# Patient Record
Sex: MALE | Race: WHITE | Employment: OTHER | URBAN - NONMETROPOLITAN AREA
[De-identification: names, ages, dates, MRNs, and addresses within clinical notes are randomized per-mention and may not be internally consistent; named-entity substitution may affect disease eponyms.]

---

## 2017-01-02 ENCOUNTER — HOSPITAL ENCOUNTER (EMERGENCY)
Facility: HOSPITAL | Age: 72
Discharge: HOME | End: 2017-01-02
Attending: PHYSICIAN ASSISTANT | Admitting: PHYSICIAN ASSISTANT
Payer: COMMERCIAL

## 2017-01-02 VITALS
RESPIRATION RATE: 20 BRPM | HEART RATE: 65 BPM | OXYGEN SATURATION: 94 % | DIASTOLIC BLOOD PRESSURE: 83 MMHG | TEMPERATURE: 97.8 F | SYSTOLIC BLOOD PRESSURE: 177 MMHG

## 2017-01-02 DIAGNOSIS — M25.512 ACUTE PAIN OF LEFT SHOULDER: ICD-10-CM

## 2017-01-02 DIAGNOSIS — J20.9 ACUTE BRONCHITIS, UNSPECIFIED ORGANISM: ICD-10-CM

## 2017-01-02 LAB
ANION GAP SERPL CALCULATED.3IONS-SCNC: 10 MMOL/L (ref 3–14)
BASOPHILS # BLD AUTO: 0 10E9/L (ref 0–0.2)
BASOPHILS NFR BLD AUTO: 0.7 %
BUN SERPL-MCNC: 19 MG/DL (ref 7–30)
CALCIUM SERPL-MCNC: 8.2 MG/DL (ref 8.5–10.1)
CHLORIDE SERPL-SCNC: 107 MMOL/L (ref 94–109)
CO2 SERPL-SCNC: 24 MMOL/L (ref 20–32)
CREAT SERPL-MCNC: 0.73 MG/DL (ref 0.66–1.25)
DIFFERENTIAL METHOD BLD: NORMAL
EOSINOPHIL # BLD AUTO: 0.1 10E9/L (ref 0–0.7)
EOSINOPHIL NFR BLD AUTO: 1.4 %
ERYTHROCYTE [DISTWIDTH] IN BLOOD BY AUTOMATED COUNT: 13.8 % (ref 10–15)
GFR SERPL CREATININE-BSD FRML MDRD: ABNORMAL ML/MIN/1.7M2
GLUCOSE SERPL-MCNC: 117 MG/DL (ref 70–99)
HCT VFR BLD AUTO: 44.2 % (ref 40–53)
HGB BLD-MCNC: 14.2 G/DL (ref 13.3–17.7)
IMM GRANULOCYTES # BLD: 0 10E9/L (ref 0–0.4)
IMM GRANULOCYTES NFR BLD: 0.2 %
LYMPHOCYTES # BLD AUTO: 0.9 10E9/L (ref 0.8–5.3)
LYMPHOCYTES NFR BLD AUTO: 16.6 %
MCH RBC QN AUTO: 27.3 PG (ref 26.5–33)
MCHC RBC AUTO-ENTMCNC: 32.1 G/DL (ref 31.5–36.5)
MCV RBC AUTO: 85 FL (ref 78–100)
MONOCYTES # BLD AUTO: 0.6 10E9/L (ref 0–1.3)
MONOCYTES NFR BLD AUTO: 10.8 %
NEUTROPHILS # BLD AUTO: 4 10E9/L (ref 1.6–8.3)
NEUTROPHILS NFR BLD AUTO: 70.3 %
NRBC # BLD AUTO: 0 10*3/UL
NRBC BLD AUTO-RTO: 0 /100
PLATELET # BLD AUTO: 156 10E9/L (ref 150–450)
POTASSIUM SERPL-SCNC: 3.6 MMOL/L (ref 3.4–5.3)
RBC # BLD AUTO: 5.21 10E12/L (ref 4.4–5.9)
SODIUM SERPL-SCNC: 141 MMOL/L (ref 133–144)
TROPONIN I SERPL-MCNC: 0.02 UG/L (ref 0–0.04)
WBC # BLD AUTO: 5.7 10E9/L (ref 4–11)

## 2017-01-02 PROCEDURE — 84484 ASSAY OF TROPONIN QUANT: CPT | Performed by: FAMILY MEDICINE

## 2017-01-02 PROCEDURE — 96374 THER/PROPH/DIAG INJ IV PUSH: CPT

## 2017-01-02 PROCEDURE — 94640 AIRWAY INHALATION TREATMENT: CPT

## 2017-01-02 PROCEDURE — 85025 COMPLETE CBC W/AUTO DIFF WBC: CPT | Performed by: FAMILY MEDICINE

## 2017-01-02 PROCEDURE — 99285 EMERGENCY DEPT VISIT HI MDM: CPT | Mod: 25

## 2017-01-02 PROCEDURE — 71010 ZZHC CHEST ONE VIEW: CPT | Mod: TC

## 2017-01-02 PROCEDURE — 93005 ELECTROCARDIOGRAM TRACING: CPT

## 2017-01-02 PROCEDURE — 80048 BASIC METABOLIC PNL TOTAL CA: CPT | Performed by: FAMILY MEDICINE

## 2017-01-02 PROCEDURE — 25000125 ZZHC RX 250: Performed by: PHYSICIAN ASSISTANT

## 2017-01-02 PROCEDURE — 36415 COLL VENOUS BLD VENIPUNCTURE: CPT | Performed by: FAMILY MEDICINE

## 2017-01-02 PROCEDURE — 40000275 ZZH STATISTIC RCP TIME EA 10 MIN

## 2017-01-02 PROCEDURE — 93010 ELECTROCARDIOGRAM REPORT: CPT | Performed by: INTERNAL MEDICINE

## 2017-01-02 PROCEDURE — 99284 EMERGENCY DEPT VISIT MOD MDM: CPT | Performed by: PHYSICIAN ASSISTANT

## 2017-01-02 RX ORDER — IPRATROPIUM BROMIDE AND ALBUTEROL SULFATE 2.5; .5 MG/3ML; MG/3ML
3 SOLUTION RESPIRATORY (INHALATION) ONCE
Status: COMPLETED | OUTPATIENT
Start: 2017-01-02 | End: 2017-01-02

## 2017-01-02 RX ORDER — AZITHROMYCIN 250 MG/1
TABLET, FILM COATED ORAL
Qty: 6 TABLET | Refills: 0 | Status: SHIPPED | OUTPATIENT
Start: 2017-01-02 | End: 2017-01-02

## 2017-01-02 RX ORDER — KETOROLAC TROMETHAMINE 15 MG/ML
15 INJECTION, SOLUTION INTRAMUSCULAR; INTRAVENOUS ONCE
Status: COMPLETED | OUTPATIENT
Start: 2017-01-02 | End: 2017-01-02

## 2017-01-02 RX ORDER — AZITHROMYCIN 250 MG/1
TABLET, FILM COATED ORAL
Qty: 6 TABLET | Refills: 0 | Status: SHIPPED | OUTPATIENT
Start: 2017-01-02 | End: 2017-04-14

## 2017-01-02 RX ADMIN — IPRATROPIUM BROMIDE AND ALBUTEROL SULFATE 3 ML: .5; 3 SOLUTION RESPIRATORY (INHALATION) at 10:41

## 2017-01-02 RX ADMIN — KETOROLAC TROMETHAMINE 15 MG: 15 INJECTION, SOLUTION INTRAMUSCULAR; INTRAVENOUS at 10:49

## 2017-01-02 ASSESSMENT — ENCOUNTER SYMPTOMS
ACTIVITY CHANGE: 0
CHILLS: 0
ABDOMINAL PAIN: 0
FEVER: 0
DIZZINESS: 0
HEADACHES: 1
CHEST TIGHTNESS: 0
SHORTNESS OF BREATH: 0
WHEEZING: 0
EYE PAIN: 0
COUGH: 1
VOMITING: 0
DIARRHEA: 0
LIGHT-HEADEDNESS: 0
APPETITE CHANGE: 0
PHOTOPHOBIA: 0
EYE REDNESS: 0
NAUSEA: 0

## 2017-01-02 NOTE — ED NOTES
Patient presents today by himself for left shoulder pain, left neck/jaw pain/pressure and intermittent chest pressure since Thursday.   Patient is also SOB but states he is always SOB and that is unchanged.   Patient does have have a cough but states he always has a cough.   Monitors applied, IV est and EKG done.

## 2017-01-02 NOTE — DISCHARGE INSTRUCTIONS
I believe there may be a small abnormality on your x-ray that may represent infection.     I have decided to treat you with an antibiotic.    Your shoulder pain is atypical for heart pain, but not completely unheard of.  The test that looks foe heart damage was normal.  This rules out a heart attack NOW, but DOES NOT rule one out in the future.      Your need to follow-up in the clinic this week for a recheck.    You need to return HERE for ANY worsening symptoms, chest pain, changes in your chronic shortness of breath, fevers, or simply return here for ANY other concerns or questions.

## 2017-01-02 NOTE — ED AVS SNAPSHOT
[facility]    [address] [address]    Phone:  [phone]                                       Lamont Doran   MRN: [id number]    Department:  [facility]   Date of Visit:  1/2/2017           Patient Information     Date Of Birth          1945        Your diagnoses for this visit were:     Acute bronchitis, unspecified organism     Acute pain of left shoulder        You were seen by Memo Quan PA-C.      Follow-up Information     Follow up with Jordin Aleman In 3 days.    Specialty:  Internal Medicine    Contact information:    [facility]  [address]  [facility] [address] [phone]          Follow up with [facility].    Specialty:  EMERGENCY MEDICINE    Why:  If symptoms worsen    Contact information:    [address] [address] [phone]    Additional information:    From [facility]: Take [address]. Turn left at [address]. Turn left at the first stoplight on [address]. At the first stop sign, take a right onto [address]. Take a left into the parking lot and continue through until you reach the North enterance of the building.       From [place]: Take [address]. Take the [address] ramp towards [place]. Turn left onto [address]. Take a slight right onto [address]. Turn left at the first stoplight on [address]. At the first stop sign, take a right onto [address]. Take a left into the parking lot and continue through until you reach the North enterance of the building.       From [place]: Take [address]. Take a right at [address]. At the first stop sign, take a right onto [address]. Take a left into the parking lot and continue through until you reach the North enterance of the building.         Discharge Instructions       I believe there may be a small abnormality on your x-ray that may represent  infection.     I have decided to treat you with an antibiotic.    Your shoulder pain is atypical for heart pain, but not completely unheard of.  The test that looks foe heart damage was normal.  This rules out a heart attack NOW, but DOES NOT rule one out in the future.      Your need to follow-up in the clinic this week for a recheck.    You need to return HERE for ANY worsening symptoms, chest pain, changes in your chronic shortness of breath, fevers, or simply return here for ANY other concerns or questions.        Review of your medicines      START taking        Dose / Directions Last dose taken    azithromycin 250 MG tablet   Commonly known as:  ZITHROMAX Z-BRAYDEN   Quantity:  6 tablet        Two tablets on the first day, then one tablet daily for the next 4 days   Refills:  0          Our records show that you are taking the medicines listed below. If these are incorrect, please call your family doctor or clinic.        Dose / Directions Last dose taken    ASPIRIN PO   Dose:  162 mg        Take 162 mg by mouth daily   Refills:  0        clopidogrel 75 MG tablet   Commonly known as:  PLAVIX   Dose:  75 mg   Quantity:  30 tablet        Take 1 tablet (75 mg) by mouth daily   Refills:  0        LIPITOR PO   Dose:  80 mg        Take 80 mg by mouth daily   Refills:  0        LISINOPRIL PO   Dose:  2.5 mg        Take 2.5 mg by mouth daily (with dinner)   Refills:  0        METOPROLOL TARTRATE PO   Dose:  12.5 mg        Take 12.5 mg by mouth 2 times daily   Refills:  0        OMEPRAZOLE PO        Take by mouth every morning   Refills:  0                Prescriptions were sent or printed at these locations (1 Prescription)                   [id number] - [place] - [address] [address] [place] [address]    Telephone:  [phone]   Fax:  [phone]   Hours:                  E-Prescribed (1 of 1)         azithromycin (ZITHROMAX Z-BRAYDEN) 250 MG tablet                Procedures and tests  "performed during your visit     Basic metabolic panel    CBC with platelets differential    Cardiac Continuous Monitoring    EKG 12-lead, tracing only    Pulse oximetry nursing    Troponin I    XR Chest Port 1 View      Orders Needing Specimen Collection     None      Pending Results     Date and Time Order Name Status Description    2017 0941 XR Chest Port 1 View In process             Thank you for choosing [facility]       Thank you for choosing [facility] for your care. Our goal is always to provide you with excellent care. Hearing back from our patients is one way we can continue to improve our services. Please take a few minutes to complete the written survey that you may receive in the mail after you visit with us. Thank you!        [employer] Information     [employer] lets you send messages to your doctor, view your test results, renew your prescriptions, schedule appointments and more. To sign up, go to [email or web address] . Click on \"Log in\" on the left side of the screen, which will take you to the Welcome page. Then click on \"Sign up Now\" on the right side of the page.     You will be asked to enter the access code listed below, as well as some personal information. Please follow the directions to create your username and password.     Your access code is: [email or web address]  Expires: 2017 11:29 AM     Your access code will  in 90 days. If you need help or a new code, please call your [facility] clinic or [phone].        After Visit Summary       This is your record. Keep this with you and show to your community pharmacist(s) and doctor(s) at your next visit.                  "

## 2017-01-02 NOTE — ED AVS SNAPSHOT
[facility]    [address]    [place] [address]    Phone:  [phone]                                       Lamont Doran   MRN: [id number]    Department:  [facility]   Date of Visit:  1/2/2017           After Visit Summary Signature Page     I have received my discharge instructions, and my questions have been answered. I have discussed any challenges I see with this plan with the nurse or doctor.    ..........................................................................................................................................  Patient/Patient Representative Signature      ..........................................................................................................................................  Patient Representative Print Name and Relationship to Patient    ..................................................               ................................................  Date                                            Time    ..........................................................................................................................................  Reviewed by Signature/Title    ...................................................              ..............................................  Date                                                            Time

## 2017-01-02 NOTE — ED PROVIDER NOTES
"  History     Chief Complaint   Patient presents with     Shoulder Pain     onset last 3 days. radiates to left lateral neck.     Cold Symptoms     sinus and chest congestion, last week     The history is provided by the patient.     Lamont Doran is a 71 year old male who presented to the ED ambulatory for evaluation of left shoulder and neck pain.  He also has been experiencing cough with sinus and chest congestion for a approx one week.  Cough is productive with clear sputum. Tells me that he feels short of breath as well.  But when we discuss this, he tells me that he is \"always\" short of breath and has no concerns regarding this issue.  He was shoveling last week and the left sided shoulder/neck pain began on Thursday 12/29 and has been constant since that time.  He describes the pain as an \"ache.\"  Points to his left anterior shoulder and states that the aches seems to radiate to the left neck.  Symptoms seem to worsen with right sided rotation and slightly with full abduction.  Also has a mild left sided headache that worsens with cough.  No weakness. Otherwise there is no alleviating or exacerbating of the pain.  No falls.  No chest pain.  He has a hx of CABG and carotid endarterectomy.  On Plavix and ASA.  Primary care is in [place].        Social History     Social History     Marital Status:      Spouse Name: N/A     Number of Children: N/A     Years of Education: N/A     Social History Main Topics     Smoking status: None     Smokeless tobacco: None     Alcohol Use: None     Drug Use: None     Sexual Activity: Not Asked     Other Topics Concern     None     Social History Narrative      I have reviewed the Medications, Allergies, Past Medical and Surgical History, and Social History in the Epic system.    Review of Systems   Constitutional: Negative for fever, chills, activity change and appetite change.   Eyes: Negative for photophobia, pain, redness and visual disturbance.   Respiratory: Positive " for cough. Negative for chest tightness, shortness of breath and wheezing.    Cardiovascular: Negative for chest pain and leg swelling.   Gastrointestinal: Negative for nausea, vomiting, abdominal pain and diarrhea.   Genitourinary: Negative.    Musculoskeletal:        Left shoulder and left sided neck pain    Skin: Negative.    Neurological: Positive for headaches. Negative for dizziness and light-headedness.        Mild intermittent left sided headache        Physical Exam   BP: 142/64 mmHg  Pulse: 76  Temp: 96.5  F (35.8  C)  Resp: 20  SpO2: (!) 91 %  Physical Exam   Constitutional: He is oriented to person, place, and time. He appears well-developed and well-nourished.   Pleasant and talkative    HENT:   Head: Normocephalic and atraumatic.   Right Ear: External ear normal.   Left Ear: External ear normal.   Mouth/Throat: Oropharynx is clear and moist.   TMs are normal    Eyes: Conjunctivae and EOM are normal. Pupils are equal, round, and reactive to light.   Neck: Normal range of motion. Neck supple.   He has a small 2cm linear rash with small vesicle on the left neck.  No other findings.    Cardiovascular: Normal rate and regular rhythm.    Pulmonary/Chest: Effort normal and breath sounds normal. No respiratory distress. He has no wheezes. He exhibits no tenderness.   Abdominal: Soft. There is no tenderness. There is no guarding.   Musculoskeletal: He exhibits no edema.   Neurological: He is alert and oriented to person, place, and time.   Skin: Skin is warm and dry.   Psychiatric: He has a normal mood and affect.   Nursing note and vitals reviewed.      ED Course   Procedures        EKG shows a NSR with RBBB.  No significant change from previous.      CXR shows what appears to be a small infiltrate in the LLL.     Medications   ipratropium - albuterol 0.5 mg/2.5 mg/3 mL (DUONEB) neb solution 3 mL (3 mLs Nebulization Given 1/2/17 1041)   ketorolac (TORADOL) injection 15 mg (15 mg Intravenous Given 1/2/17 1049)      Results for orders placed or performed during the hospital encounter of 01/02/17 (from the past 24 hour(s))   CBC with platelets differential   Result Value Ref Range    WBC 5.7 4.0 - 11.0 10e9/L    RBC Count 5.21 4.4 - 5.9 10e12/L    Hemoglobin 14.2 13.3 - 17.7 g/dL    Hematocrit 44.2 40.0 - 53.0 %    MCV 85 78 - 100 fl    MCH 27.3 26.5 - 33.0 pg    MCHC 32.1 31.5 - 36.5 g/dL    RDW 13.8 10.0 - 15.0 %    Platelet Count 156 150 - 450 10e9/L    Diff Method Automated Method     % Neutrophils 70.3 %    % Lymphocytes 16.6 %    % Monocytes 10.8 %    % Eosinophils 1.4 %    % Basophils 0.7 %    % Immature Granulocytes 0.2 %    Nucleated RBCs 0 0 /100    Absolute Neutrophil 4.0 1.6 - 8.3 10e9/L    Absolute Lymphocytes 0.9 0.8 - 5.3 10e9/L    Absolute Monocytes 0.6 0.0 - 1.3 10e9/L    Absolute Eosinophils 0.1 0.0 - 0.7 10e9/L    Absolute Basophils 0.0 0.0 - 0.2 10e9/L    Abs Immature Granulocytes 0.0 0 - 0.4 10e9/L    Absolute Nucleated RBC 0.0    Basic metabolic panel   Result Value Ref Range    Sodium 141 133 - 144 mmol/L    Potassium 3.6 3.4 - 5.3 mmol/L    Chloride 107 94 - 109 mmol/L    Carbon Dioxide 24 20 - 32 mmol/L    Anion Gap 10 3 - 14 mmol/L    Glucose 117 (H) 70 - 99 mg/dL    Urea Nitrogen 19 7 - 30 mg/dL    Creatinine 0.73 0.66 - 1.25 mg/dL    GFR Estimate >90  Non  GFR Calc   >60 mL/min/1.7m2    GFR Estimate If Black >90   GFR Calc   >60 mL/min/1.7m2    Calcium 8.2 (L) 8.5 - 10.1 mg/dL   Troponin I   Result Value Ref Range    Troponin I ES 0.016 0.000 - 0.045 ug/L        Critical Care time:  none               Labs Ordered and Resulted from Time of ED Arrival Up to the Time of Departure from the ED   BASIC METABOLIC PANEL - Abnormal; Notable for the following:     Glucose 117 (*)     Calcium 8.2 (*)     All other components within normal limits   CBC WITH PLATELETS DIFFERENTIAL   TROPONIN I   CARDIAC CONTINUOUS MONITORING   PULSE OXIMETRY NURSING       Assessments & Plan  (with Medical Decision Making)   Mr. Hill presented to the ED ambulatory with complaints of left shoulder pain and productive cough with clear sputum. He is chronically dyspneic.  No fevers.  He has a long hx of vascular disease and CAD.  We discussed his shoulder and neck pain.  This is certainly atypical for ACS.  With his constant pain for >72 hours and a negative troponin, anginal equivalent or ACS is highly unlikely.  I believe that his CXR has a LLL abnormality.  I am going to treat him with Azithromycin and close follow-up.  He does have a small vesicular rash on the left side of his neck with a possible C3 distribution, but at this point I cannot say his symptoms are from shingles with confidence.  He is outside the window for treatment as well.  Please see the discharge instructions.  He was advised to return here for ANY changes or worsening of his symptoms or pain.  He needs to follow-up in the clinic this week.      I have reviewed the nursing notes.    I have reviewed the findings, diagnosis, plan and need for follow up with the patient.    New Prescriptions    AZITHROMYCIN (ZITHROMAX Z-BRAYDEN) 250 MG TABLET    Two tablets on the first day, then one tablet daily for the next 4 days       Final diagnoses:   Acute bronchitis, unspecified organism   Acute pain of left shoulder       1/2/2017   [facility]      Memo Quan PA-C  01/02/17 2230

## 2017-04-14 ENCOUNTER — HOSPITAL ENCOUNTER (EMERGENCY)
Facility: HOSPITAL | Age: 72
Discharge: HOME | End: 2017-04-14
Attending: NURSE PRACTITIONER | Admitting: NURSE PRACTITIONER
Payer: COMMERCIAL

## 2017-04-14 VITALS
BODY MASS INDEX: 33.33 KG/M2 | DIASTOLIC BLOOD PRESSURE: 82 MMHG | TEMPERATURE: 98.1 F | OXYGEN SATURATION: 95 % | RESPIRATION RATE: 18 BRPM | WEIGHT: 239 LBS | SYSTOLIC BLOOD PRESSURE: 139 MMHG

## 2017-04-14 DIAGNOSIS — Z79.01 LONG TERM (CURRENT) USE OF ANTICOAGULANTS: ICD-10-CM

## 2017-04-14 DIAGNOSIS — S01.312A LACERATION OF AURICLE OF LEFT EAR, INITIAL ENCOUNTER: ICD-10-CM

## 2017-04-14 PROCEDURE — 12011 RPR F/E/E/N/L/M 2.5 CM/<: CPT | Performed by: NURSE PRACTITIONER

## 2017-04-14 PROCEDURE — 40000268 ZZH STATISTIC NO CHARGES

## 2017-04-14 PROCEDURE — 25000125 ZZHC RX 250: Performed by: NURSE PRACTITIONER

## 2017-04-14 PROCEDURE — 12011 RPR F/E/E/N/L/M 2.5 CM/<: CPT

## 2017-04-14 PROCEDURE — 27210995 ZZH RX 272: Performed by: NURSE PRACTITIONER

## 2017-04-14 RX ORDER — CLOPIDOGREL BISULFATE 75 MG/1
75 TABLET ORAL DAILY
COMMUNITY
End: 2020-01-01

## 2017-04-14 RX ORDER — LIDOCAINE 40 MG/G
CREAM TOPICAL ONCE
Status: COMPLETED | OUTPATIENT
Start: 2017-04-14 | End: 2017-04-14

## 2017-04-14 RX ADMIN — Medication: at 16:51

## 2017-04-14 RX ADMIN — LIDOCAINE: 40 CREAM TOPICAL at 16:50

## 2017-04-14 ASSESSMENT — ENCOUNTER SYMPTOMS
WOUND: 1
CONSTITUTIONAL NEGATIVE: 1
NEUROLOGICAL NEGATIVE: 1

## 2017-04-14 NOTE — ED NOTES
"Patient presents with laceration to L ear - happened an hour ago while working on his boat and \"something flew up at me.\" Bleeding controlled at this time. Take Plavix daily.  "

## 2017-04-14 NOTE — ED NOTES
"Pt presents with 3/4\" laceration to left ear after having a ed handle hit his ear today at 1500. Had a tetanus shot last on 8-.  "

## 2017-04-14 NOTE — ED AVS SNAPSHOT
[facility]    [address] [address]    Phone:  [phone]                                       Laomnt Doran   MRN: [id number]    Department[facility]  [facility]   Date of Visit:  4/14/2017           Patient Information     Date Of Birth          1945        Your diagnoses for this visit were:     Laceration of auricle of left ear, initial encounter     Long term (current) use of anticoagulants        You were seen by Adrienne Manzo NP.      Follow-up Information     Follow up with Jordin Aleman    Specialty:  Internal Medicine    Why:  As needed    Contact information:    [facility]  [address]  [facility] [address] [phone]          Follow up with [facility].    Specialty:  EMERGENCY MEDICINE    Why:  As needed     Contact information:    [address] [address] [phone]    Additional information:    From [place]: Take [address]. Turn left at [address]. Turn left at the first stoplight on [address]. At the first stop sign, take a right onto [address]. Take a left into the parking lot and continue through until you reach the North enterance of the building.       From [place]: Take [address]. Take the [address] ramp towards [place]. Turn left onto [address]. Take a slight right onto [address]. Turn left at the first stoplight on [address]. At the first stop sign, take a right onto [address]. Take a left into the parking lot and continue through until you reach the North enterance of the building.       From [place]: Take [address]. Take a right at [address]. At the first stop sign, take a right onto [address]. Take a left into the parking lot and continue through until you reach the North enterance of the building.         Discharge Instructions           Monitor for infection and bleeding,  follow up here if concerns develop.     Laceration: Skin Adhesive  A laceration is a cut through the skin. You have a laceration that has been closed with skin adhesive, a type of skin glue.  Home care  Acetaminophen or ibuprofen may be taken for pain, unless another pain medicine was prescribed.  If you have chronic liver or kidney disease or ever had a stomach ulcer or gastrointestinal bleeding, talk with your healthcare provider before using these medications.  General care guidelines include:    Keep the wound clean and dry. You may shower or bathe as usual, but do not use soaps, lotions, or ointments on the wound area. Do not scrub the wound. After bathing, pat the wound dry with a soft towel.    Do not scratch, rub, or pick at the adhesive film. Do not place tape directly over the film.    Do not apply liquids (such as peroxide), ointments, or creams to the wound while the film is in place.    Most skin wounds heal without problems. However, an infection sometimes occurs despite proper treatment. Therefore, watch for the signs of infection listed below.  Follow-up care  Follow up with your healthcare provider as directed by the doctor or staff. The adhesive film will fall off in 5 to 7 days.  When to seek medical care  Get prompt medical attention if any of the following occur:    Signs of infection:    Fever of 100.4 F (38 C) or higher, or as directed by your health care provider    Increasing pain in the wound    Increasing redness or swelling    Pus coming from the wound    Wound bleeds more than a small amount or bleeding doesn t stop    Wound edges come apart    You feel numbness or weakness in the wound area that doesn t go away    7179-4724 [employer]. [address] [place] [address], [place] [address]. All rights reserved. This information is not intended as a substitute for professional medical care. Always follow your healthcare professional's instructions.               Review of your  "medicines      Our records show that you are taking the medicines listed below. If these are incorrect, please call your family doctor or clinic.        Dose / Directions Last dose taken    ASPIRIN PO   Dose:  162 mg        Take 162 mg by mouth daily   Refills:  0        LIPITOR PO   Dose:  80 mg        Take 80 mg by mouth daily   Refills:  0        LISINOPRIL PO   Dose:  2.5 mg        Take 2.5 mg by mouth daily (with dinner)   Refills:  0        METOPROLOL TARTRATE PO   Dose:  12.5 mg        Take 12.5 mg by mouth 2 times daily   Refills:  0        OMEPRAZOLE PO        Take by mouth every morning   Refills:  0        PLAVIX PO   Dose:  75 mg        Take 75 mg by mouth daily   Refills:  0                Orders Needing Specimen Collection     None      Pending Results     No orders found from 2017 to 4/15/2017.            Pending Culture Results     No orders found from 2017 to 4/15/2017.            Thank you for choosing [facility]       Thank you for choosing [facility] for your care. Our goal is always to provide you with excellent care. Hearing back from our patients is one way we can continue to improve our services. Please take a few minutes to complete the written survey that you may receive in the mail after you visit with us. Thank you!        [employer] Information     [employer] lets you send messages to your doctor, view your test results, renew your prescriptions, schedule appointments and more. To sign up, go to [email or web address] . Click on \"Log in\" on the left side of the screen, which will take you to the Welcome page. Then click on \"Sign up Now\" on the right side of the page.     You will be asked to enter the access code listed below, as well as some personal information. Please follow the directions to create your username and password.     Your access code is: [email or web address]  Expires: 2017  5:16 PM     Your access code will  in 90 days. If you need help or a new code, please call " your [place] clinic or [phone].        Care EveryWhere ID     This is your Care EveryWhere ID. This could be used by other organizations to access your [place] medical records  [id number]        After Visit Summary       This is your record. Keep this with you and show to your community pharmacist(s) and doctor(s) at your next visit.

## 2017-04-14 NOTE — ED PROVIDER NOTES
History     Chief Complaint   Patient presents with     Ear Laceration     L mid ear - on Plavix     The history is provided by the patient. No  was used.     Lamont Doran is a 71 year old male who presents with a laceration to his left ear. Was hit in the ear with a boat ed at home. Not able to control the bleeding at home, presents for repair.   Tdap is UTD.   No headache, dizziness or confusion. No chest pain or SOB. He has a hx of CABG and carotid endarterectomy. On Plavix and  mg. PCP is at [facility].    I have reviewed the Medications, Allergies, Past Medical and Surgical History, and Social History in the Epic system.    Review of Systems   Constitutional: Negative.    HENT: Negative.    Skin: Positive for wound (Laceration to Left ear).   Neurological: Negative.        Physical Exam   BP: 139/82  Heart Rate: 101  Temp: 98.1  F (36.7  C)  Resp: 18  Weight: 108.4 kg (239 lb)  SpO2: 95 %  Physical Exam   Constitutional: He is oriented to person, place, and time. He appears well-developed and well-nourished. No distress.   Eating chips and drinking soda in office, drove himself here.    HENT:   Head: Normocephalic.   Left Ear: Hearing and tympanic membrane normal. Left ear exhibits lacerations. No drainage or swelling. No decreased hearing is noted.   Ears:    Left ear: 1 cm laceration to helix, superficial abrasion to antihelix. Cartilage is intact. Scant amount of bleeding.     Eyes: Conjunctivae and lids are normal. No scleral icterus.   Neck: Normal range of motion. Neck supple.   Cardiovascular: Normal rate.    Pulmonary/Chest: Effort normal.   Musculoskeletal: Normal range of motion.   Neurological: He is alert and oriented to person, place, and time. Coordination normal.   Skin: Skin is warm. He is not diaphoretic.   Psychiatric: He has a normal mood and affect. His speech is normal and behavior is normal. Judgment and thought content normal. Cognition and memory are  normal.   Nursing note and vitals reviewed.      ED Course     ED Course     Laceration repair  Date/Time: 4/14/2017 5:05 PM  Performed by: ADRIENNE SOUTH  Authorized by: ADRIENNE SOUTH   Consent: Verbal consent obtained.  Risks and benefits: risks, benefits and alternatives were discussed  Consent given by: patient  Patient identity confirmed: verbally with patient  Body area: head/neck  Location details: left ear  Laceration length: 1 cm  Foreign bodies: no foreign bodies    Anesthesia:  Local Anesthetic: topical anesthetic   Sedation:  Patient sedated: no    Preparation: Patient was prepped and draped in the usual sterile fashion.  Irrigation solution: saline  Skin closure: glue  Approximation: close  Approximation difficulty: simple  Dressing: antibiotic ointment  Patient tolerance: Patient tolerated the procedure well with no immediate complications          Assessments & Plan (with Medical Decision Making)     I have reviewed the nursing notes.  I have reviewed the findings, diagnosis, plan and need for follow up with the patient.  Discussed wound care.   Monitor for infection and bleeding.   Follow up if concerns develop.   Written discharge instructions given regarding laceration.       Final diagnoses:   Laceration of auricle of left ear, initial encounter   Long term (current) use of anticoagulants       4/14/2017   [facility]     Adrienne South NP  04/14/17 0624

## 2017-04-14 NOTE — ED AVS SNAPSHOT
[facility]    [address] [place] [address]    Phone:  [phone]                                       Lamont Doran   MRN: [id number]    Department:  [facility]   Date of Visit:  4/14/2017           After Visit Summary Signature Page     I have received my discharge instructions, and my questions have been answered. I have discussed any challenges I see with this plan with the nurse or doctor.    ..........................................................................................................................................  Patient/Patient Representative Signature      ..........................................................................................................................................  Patient Representative Print Name and Relationship to Patient    ..................................................               ................................................  Date                                            Time    ..........................................................................................................................................  Reviewed by Signature/Title    ...................................................              ..............................................  Date                                                            Time

## 2017-04-14 NOTE — DISCHARGE INSTRUCTIONS
Monitor for infection and bleeding, follow up here if concerns develop.     Laceration: Skin Adhesive  A laceration is a cut through the skin. You have a laceration that has been closed with skin adhesive, a type of skin glue.  Home care  Acetaminophen or ibuprofen may be taken for pain, unless another pain medicine was prescribed.  If you have chronic liver or kidney disease or ever had a stomach ulcer or gastrointestinal bleeding, talk with your healthcare provider before using these medications.  General care guidelines include:    Keep the wound clean and dry. You may shower or bathe as usual, but do not use soaps, lotions, or ointments on the wound area. Do not scrub the wound. After bathing, pat the wound dry with a soft towel.    Do not scratch, rub, or pick at the adhesive film. Do not place tape directly over the film.    Do not apply liquids (such as peroxide), ointments, or creams to the wound while the film is in place.    Most skin wounds heal without problems. However, an infection sometimes occurs despite proper treatment. Therefore, watch for the signs of infection listed below.  Follow-up care  Follow up with your healthcare provider as directed by the doctor or staff. The adhesive film will fall off in 5 to 7 days.  When to seek medical care  Get prompt medical attention if any of the following occur:    Signs of infection:    Fever of 100.4 F (38 C) or higher, or as directed by your health care provider    Increasing pain in the wound    Increasing redness or swelling    Pus coming from the wound    Wound bleeds more than a small amount or bleeding doesn t stop    Wound edges come apart    You feel numbness or weakness in the wound area that doesn t go away    8464-7095 The "[employer]. [address] [place] [address], [place] [address]. All rights reserved. This information is not intended as a substitute for professional medical care. Always follow your healthcare professional's  instructions.

## 2017-08-03 ENCOUNTER — OFFICE VISIT (OUTPATIENT)
Dept: CHIROPRACTIC MEDICINE | Facility: OTHER | Age: 72
End: 2017-08-03
Attending: CHIROPRACTOR
Payer: COMMERCIAL

## 2017-08-03 DIAGNOSIS — M99.03 SEGMENTAL AND SOMATIC DYSFUNCTION OF LUMBAR REGION: Primary | ICD-10-CM

## 2017-08-03 DIAGNOSIS — M54.50 ACUTE RIGHT-SIDED LOW BACK PAIN WITHOUT SCIATICA: ICD-10-CM

## 2017-08-03 DIAGNOSIS — M99.02 SEGMENTAL AND SOMATIC DYSFUNCTION OF THORACIC REGION: ICD-10-CM

## 2017-08-03 PROCEDURE — 98940 CHIROPRACT MANJ 1-2 REGIONS: CPT | Mod: AT | Performed by: CHIROPRACTOR

## 2017-08-03 NOTE — MR AVS SNAPSHOT
"              After Visit Summary   8/3/2017    Lamont Doran    MRN: [id number]           Patient Information     Date Of Birth          1945        Visit Information        Provider Department      8/3/2017 3:30 PM Jordin Rawls DC [facility]        Today's Diagnoses     Segmental and somatic dysfunction of lumbar region    -  1    Acute right-sided low back pain without sciatica        Segmental and somatic dysfunction of thoracic region           Follow-ups after your visit        Your next 10 appointments already scheduled     Aug 10, 2017 10:30 AM CDT   Return Visit with ESTRELLITA Bro ([facility])    [address]  [facility] [address]   [phone]              Who to contact     If you have questions or need follow up information about today's clinic visit or your schedule please contact  [facility] JANA SINGH directly at [phone].  Normal or non-critical lab and imaging results will be communicated to you by MyChart, letter or phone within 4 business days after the clinic has received the results. If you do not hear from us within 7 days, please contact the clinic through Voices[employer] or phone. If you have a critical or abnormal lab result, we will notify you by phone as soon as possible.  Submit refill requests through [employer] or call your pharmacy and they will forward the refill request to us. Please allow 3 business days for your refill to be completed.          Additional Information About Your Visit        MyChart Information     [employer] lets you send messages to your doctor, view your test results, renew your prescriptions, schedule appointments and more. To sign up, go to [email or web address] . Click on \"Log in\" on the left side of the screen, which will take you to the Welcome page. Then click on \"Sign up Now\" on the right side of the page.     You will be asked to enter the access code listed below, as well as some personal " information. Please follow the directions to create your username and password.     Your access code is: [email or web address]  Expires: 2017  3:44 PM     Your access code will  in 90 days. If you need help or a new code, please call your [place] clinic or [phone].        Care EveryWhere ID     This is your Care EveryWhere ID. This could be used by other organizations to access your [place] medical records  [id number]         Blood Pressure from Last 3 Encounters:   17 139/82   17 177/83   02/01/15 125/64    Weight from Last 3 Encounters:   17 239 lb (108.4 kg)   01/31/15 224 lb (101.6 kg)   14 236 lb (107 kg)              We Performed the Following     CHIROPRAC MANIP,SPINAL,1-2 REGIONS        Primary Care Provider Office Phone # Fax #    Jordin GARCIA [facility] [phone]       [facility] [address]  [facility] [address]        Equal Access to Services     [facility]: Hadii aad ku hadasho Soomaali, waaxda luqadaha, qaybta kaalmada adeegyada, waxay camiloin hayaan oniel sanchez . So [facility] [phone].    ATENCIÓN: Si habla español, tiene a flannery disposición servicios gratuitos de asistencia lingüística. Llame al [phone].    We comply with applicable federal civil rights laws and [place] laws. We do not discriminate on the basis of race, color, national origin, age, disability sex, sexual orientation or gender identity.            Thank you!     Thank you for choosing  [facility]  for your care. Our goal is always to provide you with excellent care. Hearing back from our patients is one way we can continue to improve our services. Please take a few minutes to complete the written survey that you may receive in the mail after your visit with us. Thank you!             Your Updated Medication List - Protect others around you: Learn how to safely use, store and throw away your medicines at www.disposemymeds.org.          This list is accurate  as of: 8/3/17 11:59 PM.  Always use your most recent med list.                   Brand Name Dispense Instructions for use Diagnosis    ASPIRIN PO      Take 162 mg by mouth daily        LIPITOR PO      Take 80 mg by mouth daily        LISINOPRIL PO      Take 2.5 mg by mouth daily (with dinner)        METOPROLOL TARTRATE PO      Take 12.5 mg by mouth 2 times daily        OMEPRAZOLE PO      Take by mouth every morning        PLAVIX PO      Take 75 mg by mouth daily

## 2017-08-04 ENCOUNTER — OFFICE VISIT (OUTPATIENT)
Dept: CHIROPRACTIC MEDICINE | Facility: OTHER | Age: 72
End: 2017-08-04
Attending: CHIROPRACTOR
Payer: COMMERCIAL

## 2017-08-04 DIAGNOSIS — M99.02 SEGMENTAL AND SOMATIC DYSFUNCTION OF THORACIC REGION: ICD-10-CM

## 2017-08-04 DIAGNOSIS — M54.50 ACUTE BILATERAL LOW BACK PAIN WITHOUT SCIATICA: ICD-10-CM

## 2017-08-04 DIAGNOSIS — M99.03 SEGMENTAL AND SOMATIC DYSFUNCTION OF LUMBAR REGION: Primary | ICD-10-CM

## 2017-08-04 PROCEDURE — 98940 CHIROPRACT MANJ 1-2 REGIONS: CPT | Mod: AT | Performed by: CHIROPRACTOR

## 2017-08-04 NOTE — MR AVS SNAPSHOT
"              After Visit Summary   8/4/2017    Lamont Doran    MRN: [id number]           Patient Information     Date Of Birth          1945        Visit Information        Provider Department      8/4/2017 10:40 AM J[facility]ls [facility]        Today's Diagnoses     Segmental and somatic dysfunction of lumbar region    -  1    Acute bilateral low back pain without sciatica        Segmental and somatic dysfunction of thoracic region           Follow-ups after your visit        Your next 10 appointments already scheduled     Aug 10, 2017 10:30 AM CDT   Return Visit with Jordin Rawls DC   [facility]    [address]  [place] [address]   [phone]              Who to contact     If you have questions or need follow up information about today's clinic visit or your schedule please contact  [facility] JANA SINGH directly at [phone].  Normal or non-critical lab and imaging results will be communicated to you by MyChart, letter or phone within 4 business days after the clinic has received the results. If you do not hear from us within 7 days, please contact the clinic through MyChart or phone. If you have a critical or abnormal lab result, we will notify you by phone as soon as possible.  Submit refill requests through mmCHANNEL or call your pharmacy and they will forward the refill request to us. Please allow 3 business days for your refill to be completed.          Additional Information About Your Visit        MyChart Information     mmCHANNEL lets you send messages to your doctor, view your test results, renew your prescriptions, schedule appointments and more. To sign up, go to [email or web address] . Click on \"Log in\" on the left side of the screen, which will take you to the Welcome page. Then click on \"Sign up Now\" on the right side of the page.     You will be asked to enter the access code listed below, as well as some personal " information. Please follow the directions to create your username and password.     Your access code is: [email or web address]  Expires: 2017  3:44 PM     Your access code will  in 90 days. If you need help or a new code, please call your [place] clinic or [phone].        Care EveryWhere ID     This is your Care EveryWhere ID. This could be used by other organizations to access your [place] medical records  [id number]         Blood Pressure from Last 3 Encounters:   17 139/82   17 177/83   02/01/15 125/64    Weight from Last 3 Encounters:   17 239 lb (108.4 kg)   01/31/15 224 lb (101.6 kg)   14 236 lb (107 kg)              We Performed the Following     CHIROPRAC MANIP,SPINAL,1-2 REGIONS        Primary Care Provider Office Phone # Fax #    Jordin GARCIA [facility] [phone]       [facility] [address]  [facility] [address]        Equal Access to Services     [facility]: Hadii aad ku hadasho Soomaali, waaxda luqadaha, qaybta kaalmada adeegyada, waxay camiloin hayaan oniel sanchez . So [facility] [phone].    ATENCIÓN: Si habla español, tiene a flannery disposición servicios gratuitos de asistencia lingüística. Llame al [phone].    We comply with applicable federal civil rights laws and [place] laws. We do not discriminate on the basis of race, color, national origin, age, disability sex, sexual orientation or gender identity.            Thank you!     Thank you for choosing  [facility]  for your care. Our goal is always to provide you with excellent care. Hearing back from our patients is one way we can continue to improve our services. Please take a few minutes to complete the written survey that you may receive in the mail after your visit with us. Thank you!             Your Updated Medication List - Protect others around you: Learn how to safely use, store and throw away your medicines at www.disposemymeds.org.          This list is accurate  as of: 8/4/17 11:59 PM.  Always use your most recent med list.                   Brand Name Dispense Instructions for use Diagnosis    ASPIRIN PO      Take 162 mg by mouth daily        LIPITOR PO      Take 80 mg by mouth daily        LISINOPRIL PO      Take 2.5 mg by mouth daily (with dinner)        METOPROLOL TARTRATE PO      Take 12.5 mg by mouth 2 times daily        OMEPRAZOLE PO      Take by mouth every morning        PLAVIX PO      Take 75 mg by mouth daily

## 2017-08-07 ENCOUNTER — OFFICE VISIT (OUTPATIENT)
Dept: CHIROPRACTIC MEDICINE | Facility: OTHER | Age: 72
End: 2017-08-07
Attending: CHIROPRACTOR
Payer: COMMERCIAL

## 2017-08-07 DIAGNOSIS — M54.50 ACUTE BILATERAL LOW BACK PAIN WITHOUT SCIATICA: ICD-10-CM

## 2017-08-07 DIAGNOSIS — M99.02 SEGMENTAL AND SOMATIC DYSFUNCTION OF THORACIC REGION: ICD-10-CM

## 2017-08-07 DIAGNOSIS — M99.03 SEGMENTAL AND SOMATIC DYSFUNCTION OF LUMBAR REGION: Primary | ICD-10-CM

## 2017-08-07 PROCEDURE — 98940 CHIROPRACT MANJ 1-2 REGIONS: CPT | Mod: AT | Performed by: CHIROPRACTOR

## 2017-08-07 NOTE — MR AVS SNAPSHOT
"              After Visit Summary   8/7/2017    Lamont Doran    MRN: [id number]           Patient Information     Date Of Birth          1945        Visit Information        Provider Department      8/7/2017 11:10 AM [facility]ls [facility]        Today's Diagnoses     Segmental and somatic dysfunction of lumbar region    -  1    Acute bilateral low back pain without sciatica        Segmental and somatic dysfunction of thoracic region           Follow-ups after your visit        Your next 10 appointments already scheduled     Aug 10, 2017 10:30 AM CDT   Return Visit with Jordin Rawls DC   [facility]    [address]  [place] [address]   [phone]              Who to contact     If you have questions or need follow up information about today's clinic visit or your schedule please contact  [facility] JANA SINGH directly at [phone].  Normal or non-critical lab and imaging results will be communicated to you by MyChart, letter or phone within 4 business days after the clinic has received the results. If you do not hear from us within 7 days, please contact the clinic through MyChart or phone. If you have a critical or abnormal lab result, we will notify you by phone as soon as possible.  Submit refill requests through [employer] or call your pharmacy and they will forward the refill request to us. Please allow 3 business days for your refill to be completed.          Additional Information About Your Visit        MyChart Information     [employer] lets you send messages to your doctor, view your test results, renew your prescriptions, schedule appointments and more. To sign up, go to [email or web address] . Click on \"Log in\" on the left side of the screen, which will take you to the Welcome page. Then click on \"Sign up Now\" on the right side of the page.     You will be asked to enter the access code listed below, as well as some personal " information. Please follow the directions to create your username and password.     Your access code is: [email or web address]  Expires: 2017  3:44 PM     Your access code will  in 90 days. If you need help or a new code, please call your [place] clinic or [phone].        Care EveryWhere ID     This is your Care EveryWhere ID. This could be used by other organizations to access your [place] medical records  [id number]         Blood Pressure from Last 3 Encounters:   17 139/82   17 177/83   02/01/15 125/64    Weight from Last 3 Encounters:   17 239 lb (108.4 kg)   01/31/15 224 lb (101.6 kg)   14 236 lb (107 kg)              We Performed the Following     CHIROPRAC MANIP,SPINAL,1-2 REGIONS        Primary Care Provider Office Phone # Fax #    Jordin GARCIA [facility] [phone]       [facility] [address]  [facility] [address]        Equal Access to Services     [facility]: Hadii aad ku hadasho Soomaali, waaxda luqadaha, qaybta kaalmada adeegyada, waxay camiloin hayaan oniel sanchez . So [facility] [phone].    ATENCIÓN: Si habla español, tiene a flannery disposición servicios gratuitos de asistencia lingüística. Llame al [phone].    We comply with applicable federal civil rights laws and [place] laws. We do not discriminate on the basis of race, color, national origin, age, disability sex, sexual orientation or gender identity.            Thank you!     Thank you for choosing  [facility]  for your care. Our goal is always to provide you with excellent care. Hearing back from our patients is one way we can continue to improve our services. Please take a few minutes to complete the written survey that you may receive in the mail after your visit with us. Thank you!             Your Updated Medication List - Protect others around you: Learn how to safely use, store and throw away your medicines at www.disposemymeds.org.          This list is accurate  as of: 8/7/17 11:59 PM.  Always use your most recent med list.                   Brand Name Dispense Instructions for use Diagnosis    ASPIRIN PO      Take 162 mg by mouth daily        LIPITOR PO      Take 80 mg by mouth daily        LISINOPRIL PO      Take 2.5 mg by mouth daily (with dinner)        METOPROLOL TARTRATE PO      Take 12.5 mg by mouth 2 times daily        OMEPRAZOLE PO      Take by mouth every morning        PLAVIX PO      Take 75 mg by mouth daily

## 2017-08-07 NOTE — PROGRESS NOTES
Subjective Finding:    Chief compalint: Patient presents with:  Back Pain: right low back pain  , Pain Scale: 7/10, Intensity: sharp, Duration: 10 days, Radiating:  right buttock.    Date of injury:     Activities that the pain restricts:   Home/household/hobbies/social activities: yes.  Work duties: no.  Sleep: no.  Makes symptoms better: rest.  Makes symptoms worse: activity.  Have you seen anyone else for the symptoms? No.  Work related: no.  Automobile related injury: no.    Objective and Assessment:    Posture Analysis:   High shoulder: .  Head tilt: .  High iliac crest: right.  Head carriage: neutral.  Thoracic Kyphosis: neutral.  Lumbar Lordosis: forward.    Lumbar Range of Motion: extension decreased, left lateral flexion decreased and right lateral flexion decreased.  Cervical Range of Motion: .  Thoracic Range of Motion: .  Extremity Range of Motion: .    Palpation:   Quad lumb: right, referred pain: no    Segmental dysfunction pre-treatment and treatment area: T5, T11, L5 and PSIS Right.    Assessment post-treatment:  Cervical: .  Thoracic: ROM increased.  Lumbar: ROM increased.    Comments: .      Complicating Factors: .    Procedure(s):  [facility]:  [address]70 Chiropractic manipulative treatment 1-2 regions performed   Thoracic: Diversified, See above for level, Prone and Lumbar: Diversified, See above for level, Side posture    Modalities:  None performed this visit    Therapeutic procedures:  None    Plan:  Treatment plan: PRN.  Instructed patient: stretch as instructed at visit.  Short term goals: increase ROM.  Long term goals: increase ADL.  Prognosis: excellent.

## 2017-08-08 NOTE — PROGRESS NOTES
Subjective Finding:    Chief compalint: Patient presents with:  Back Pain  , Pain Scale: 7/10, Intensity: sharp, Duration: 10 days, Radiating:  right buttock.    Date of injury:     Activities that the pain restricts:   Home/household/hobbies/social activities: yes.  Work duties: no.  Sleep: no.  Makes symptoms better: rest.  Makes symptoms worse: activity.  Have you seen anyone else for the symptoms? No.  Work related: no.  Automobile related injury: no.    Objective and Assessment:    Posture Analysis:   High shoulder: .  Head tilt: .  High iliac crest: right.  Head carriage: neutral.  Thoracic Kyphosis: neutral.  Lumbar Lordosis: forward.    Lumbar Range of Motion: extension decreased, left lateral flexion decreased and right lateral flexion decreased.  Cervical Range of Motion: .  Thoracic Range of Motion: .  Extremity Range of Motion: .    Palpation:   Quad lumb: right, referred pain: no    Segmental dysfunction pre-treatment and treatment area: T5, T11, L5 and PSIS Right.    Assessment post-treatment:  Cervical: .  Thoracic: ROM increased.  Lumbar: ROM increased.    Comments: .      Complicating Factors: .    Procedure(s):  [facility]:  36989 Chiropractic manipulative treatment 1-2 regions performed   Thoracic: Diversified, See above for level, Prone and Lumbar: Diversified, See above for level, Side posture    Modalities:  None performed this visit    Therapeutic procedures:  None    Plan:  Treatment plan: PRN.  Instructed patient: stretch as instructed at visit.  Short term goals: increase ROM.  Long term goals: increase ADL.  Prognosis: excellent.

## 2017-08-08 NOTE — PROGRESS NOTES
Subjective Finding:    Chief compalint: No chief complaint on file.  , Pain Scale: 7/10, Intensity: sharp, Duration: 10 days, Radiating:  right buttock.    Date of injury:     Activities that the pain restricts:   Home/household/hobbies/social activities: yes.  Work duties: no.  Sleep: no.  Makes symptoms better: rest.  Makes symptoms worse: activity.  Have you seen anyone else for the symptoms? No.  Work related: no.  Automobile related injury: no.    Objective and Assessment:    Posture Analysis:   High shoulder: .  Head tilt: .  High iliac crest: right.  Head carriage: neutral.  Thoracic Kyphosis: neutral.  Lumbar Lordosis: forward.    Lumbar Range of Motion: extension decreased, left lateral flexion decreased and right lateral flexion decreased.  Cervical Range of Motion: .  Thoracic Range of Motion: .  Extremity Range of Motion: .    Palpation:   Quad lumb: right, referred pain: no    Segmental dysfunction pre-treatment and treatment area: T5, T11, L5 and PSIS Right.    Assessment post-treatment:  Cervical: .  Thoracic: ROM increased.  Lumbar: ROM increased.    Comments: .      Complicating Factors: .    Procedure(s):  CMT:  88804 Chiropractic manipulative treatment 1-2 regions performed   Thoracic: Diversified, See above for level, Prone and Lumbar: Diversified, See above for level, Side posture    Modalities:  None performed this visit    Therapeutic procedures:  None    Plan:  Treatment plan: PRN.  Instructed patient: stretch as instructed at visit.  Short term goals: increase ROM.  Long term goals: increase ADL.  Prognosis: excellent.

## 2017-08-10 ENCOUNTER — OFFICE VISIT (OUTPATIENT)
Dept: CHIROPRACTIC MEDICINE | Facility: OTHER | Age: 72
End: 2017-08-10
Attending: CHIROPRACTOR
Payer: COMMERCIAL

## 2017-08-10 DIAGNOSIS — M99.03 SEGMENTAL AND SOMATIC DYSFUNCTION OF LUMBAR REGION: Primary | ICD-10-CM

## 2017-08-10 DIAGNOSIS — M99.02 SEGMENTAL AND SOMATIC DYSFUNCTION OF THORACIC REGION: ICD-10-CM

## 2017-08-10 DIAGNOSIS — M54.50 ACUTE BILATERAL LOW BACK PAIN WITHOUT SCIATICA: ICD-10-CM

## 2017-08-10 PROCEDURE — 98940 CHIROPRACT MANJ 1-2 REGIONS: CPT | Mod: AT | Performed by: CHIROPRACTOR

## 2017-08-10 NOTE — PROGRESS NOTES
Subjective Finding:    Chief compalint: Patient presents with:  Back Pain  , Pain Scale: 7/10, Intensity: sharp, Duration: 10 days, Radiating:  right buttock.    Date of injury:     Activities that the pain restricts:   Home/household/hobbies/social activities: yes.  Work duties: no.  Sleep: no.  Makes symptoms better: rest.  Makes symptoms worse: activity.  Have you seen anyone else for the symptoms? No.  Work related: no.  Automobile related injury: no.    Objective and Assessment:    Posture Analysis:   High shoulder: .  Head tilt: .  High iliac crest: right.  Head carriage: neutral.  Thoracic Kyphosis: neutral.  Lumbar Lordosis: forward.    Lumbar Range of Motion: extension decreased, left lateral flexion decreased and right lateral flexion decreased.  Cervical Range of Motion: .  Thoracic Range of Motion: .  Extremity Range of Motion: .    Palpation:   Quad lumb: right, referred pain: no    Segmental dysfunction pre-treatment and treatment area: T5, T11, L5 and PSIS Right.    Assessment post-treatment:  Cervical: .  Thoracic: ROM increased.  Lumbar: ROM increased.    Comments: .      Complicating Factors: .    Procedure(s):  CMT:  30923 Chiropractic manipulative treatment 1-2 regions performed   Thoracic: Diversified, See above for level, Prone and Lumbar: Diversified, See above for level, Side posture    Modalities:  None performed this visit    Therapeutic procedures:  None    Plan:  Treatment plan: PRN.  Instructed patient: stretch as instructed at visit.  Short term goals: increase ROM.  Long term goals: increase ADL.  Prognosis: excellent.

## 2017-08-10 NOTE — MR AVS SNAPSHOT
"              After Visit Summary   8/10/2017    Lamont Doran    MRN: [id number]           Patient Information     Date Of Birth          1945        Visit Information        Provider Department      8/10/2017 10:30 AM Jordin Rawls DC  [facility]        Today's Diagnoses     Segmental and somatic dysfunction of lumbar region    -  1    Acute bilateral low back pain without sciatica        Segmental and somatic dysfunction of thoracic region           Follow-ups after your visit        Who to contact     If you have questions or need follow up information about today's clinic visit or your schedule please contact  [facility] directly at [phone].  Normal or non-critical lab and imaging results will be communicated to you by Carn[employer], letter or phone within 4 business days after the clinic has received the results. If you do not hear from us within 7 days, please contact the clinic through Carnad[employer] or phone. If you have a critical or abnormal lab result, we will notify you by phone as soon as possible.  Submit refill requests through [employer] or call your pharmacy and they will forward the refill request to us. Please allow 3 business days for your refill to be completed.          Additional Information About Your Visit        MyChart Information     [employer] lets you send messages to your doctor, view your test results, renew your prescriptions, schedule appointments and more. To sign up, go to [email or web address] . Click on \"Log in\" on the left side of the screen, which will take you to the Welcome page. Then click on \"Sign up Now\" on the right side of the page.     You will be asked to enter the access code listed below, as well as some personal information. Please follow the directions to create your username and password.     Your access code is: [email or web address]  Expires: 2017  3:44 PM     Your access code will  in 90 days. If you need help or a new code, please call " your [facility] [place] clinic or [phone].        Care EveryWhere ID     This is your Care EveryWhere ID. This could be used by other organizations to access your [facility] medical records  [id number]         Blood Pressure from Last 3 Encounters:   04/14/17 139/82   01/02/17 177/83   02/01/15 125/64    Weight from Last 3 Encounters:   04/14/17 239 lb (108.4 kg)   01/31/15 224 lb (101.6 kg)   03/31/14 236 lb (107 kg)              We Performed the Following     CHIROPRAC MANIP,SPINAL,1-2 REGIONS        Primary Care Provider Office Phone # Fax #    Jordin GARCIA [facility] [phone]       [facility] [address]  [facility] [address]        Equal Access to Services     MARISEL CASIANO : Hadii magen laboy hadasho Soomaali, waaxda luqadaha, qaybta kaalmada adeegyada, tereza mariscal hayniraj sanchez . So [facility] [phone].    ATENCIÓN: Si habla español, tiene a flannery disposición servicios gratuitos de asistencia lingüística. Llame al [phone].    We comply with applicable federal civil rights laws and [place] laws. We do not discriminate on the basis of race, color, national origin, age, disability sex, sexual orientation or gender identity.            Thank you!     Thank you for choosing  [facility]  for your care. Our goal is always to provide you with excellent care. Hearing back from our patients is one way we can continue to improve our services. Please take a few minutes to complete the written survey that you may receive in the mail after your visit with us. Thank you!             Your Updated Medication List - Protect others around you: Learn how to safely use, store and throw away your medicines at www.disposemymeds.org.          This list is accurate as of: 8/10/17 11:13 AM.  Always use your most recent med list.                   Brand Name Dispense Instructions for use Diagnosis    ASPIRIN PO      Take 162 mg by mouth daily        LIPITOR PO      Take 80 mg by mouth daily         LISINOPRIL PO      Take 2.5 mg by mouth daily (with dinner)        METOPROLOL TARTRATE PO      Take 12.5 mg by mouth 2 times daily        OMEPRAZOLE PO      Take by mouth every morning        PLAVIX PO      Take 75 mg by mouth daily

## 2017-11-15 ENCOUNTER — HOSPITAL ENCOUNTER (OUTPATIENT)
Facility: HOSPITAL | Age: 72
End: 2017-11-15
Attending: INTERNAL MEDICINE | Admitting: INTERNAL MEDICINE
Payer: COMMERCIAL

## 2017-12-26 ENCOUNTER — HOSPITAL ENCOUNTER (EMERGENCY)
Facility: HOSPITAL | Age: 72
Discharge: HOME | End: 2017-12-26
Attending: FAMILY MEDICINE | Admitting: FAMILY MEDICINE
Payer: COMMERCIAL

## 2017-12-26 ENCOUNTER — APPOINTMENT (OUTPATIENT)
Dept: GENERAL RADIOLOGY | Facility: HOSPITAL | Age: 72
End: 2017-12-26
Attending: FAMILY MEDICINE
Payer: COMMERCIAL

## 2017-12-26 VITALS
OXYGEN SATURATION: 94 % | RESPIRATION RATE: 16 BRPM | HEART RATE: 80 BPM | SYSTOLIC BLOOD PRESSURE: 177 MMHG | DIASTOLIC BLOOD PRESSURE: 95 MMHG | TEMPERATURE: 98.4 F

## 2017-12-26 DIAGNOSIS — R07.9 CHEST PAIN, UNSPECIFIED TYPE: ICD-10-CM

## 2017-12-26 DIAGNOSIS — I10 HYPERTENSION, UNSPECIFIED TYPE: ICD-10-CM

## 2017-12-26 LAB
ALBUMIN SERPL-MCNC: 2.6 G/DL (ref 3.4–5)
ALBUMIN UR-MCNC: 30 MG/DL
ALP SERPL-CCNC: 79 U/L (ref 40–150)
ALT SERPL W P-5'-P-CCNC: 15 U/L (ref 0–70)
ANION GAP SERPL CALCULATED.3IONS-SCNC: 7 MMOL/L (ref 3–14)
APPEARANCE UR: CLEAR
AST SERPL W P-5'-P-CCNC: 14 U/L (ref 0–45)
BACTERIA #/AREA URNS HPF: ABNORMAL /HPF
BASOPHILS # BLD AUTO: 0 10E9/L (ref 0–0.2)
BASOPHILS NFR BLD AUTO: 0.5 %
BILIRUB SERPL-MCNC: 0.4 MG/DL (ref 0.2–1.3)
BILIRUB UR QL STRIP: NEGATIVE
BUN SERPL-MCNC: 18 MG/DL (ref 7–30)
CALCIUM SERPL-MCNC: 7.9 MG/DL (ref 8.5–10.1)
CHLORIDE SERPL-SCNC: 109 MMOL/L (ref 94–109)
CO2 SERPL-SCNC: 26 MMOL/L (ref 20–32)
COLOR UR AUTO: ABNORMAL
CREAT SERPL-MCNC: 0.64 MG/DL (ref 0.66–1.25)
DIFFERENTIAL METHOD BLD: ABNORMAL
EOSINOPHIL # BLD AUTO: 0.1 10E9/L (ref 0–0.7)
EOSINOPHIL NFR BLD AUTO: 2.3 %
ERYTHROCYTE [DISTWIDTH] IN BLOOD BY AUTOMATED COUNT: 13.9 % (ref 10–15)
GFR SERPL CREATININE-BSD FRML MDRD: >90 ML/MIN/1.7M2
GLUCOSE SERPL-MCNC: 107 MG/DL (ref 70–99)
GLUCOSE UR STRIP-MCNC: NEGATIVE MG/DL
HCT VFR BLD AUTO: 42.7 % (ref 40–53)
HGB BLD-MCNC: 13.9 G/DL (ref 13.3–17.7)
HGB UR QL STRIP: NEGATIVE
IMM GRANULOCYTES # BLD: 0 10E9/L (ref 0–0.4)
IMM GRANULOCYTES NFR BLD: 0.2 %
KETONES UR STRIP-MCNC: NEGATIVE MG/DL
LEUKOCYTE ESTERASE UR QL STRIP: NEGATIVE
LYMPHOCYTES # BLD AUTO: 1.4 10E9/L (ref 0.8–5.3)
LYMPHOCYTES NFR BLD AUTO: 24.7 %
MCH RBC QN AUTO: 27.7 PG (ref 26.5–33)
MCHC RBC AUTO-ENTMCNC: 32.6 G/DL (ref 31.5–36.5)
MCV RBC AUTO: 85 FL (ref 78–100)
MONOCYTES # BLD AUTO: 0.6 10E9/L (ref 0–1.3)
MONOCYTES NFR BLD AUTO: 10.4 %
MUCOUS THREADS #/AREA URNS LPF: PRESENT /LPF
NEUTROPHILS # BLD AUTO: 3.5 10E9/L (ref 1.6–8.3)
NEUTROPHILS NFR BLD AUTO: 61.9 %
NITRATE UR QL: NEGATIVE
NRBC # BLD AUTO: 0 10*3/UL
NRBC BLD AUTO-RTO: 0 /100
NT-PROBNP SERPL-MCNC: 483 PG/ML (ref 0–900)
PH UR STRIP: 7 PH (ref 4.7–8)
PLATELET # BLD AUTO: 134 10E9/L (ref 150–450)
POTASSIUM SERPL-SCNC: 3.3 MMOL/L (ref 3.4–5.3)
PROT SERPL-MCNC: 6.6 G/DL (ref 6.8–8.8)
RBC # BLD AUTO: 5.01 10E12/L (ref 4.4–5.9)
RBC #/AREA URNS AUTO: 1 /HPF (ref 0–2)
SODIUM SERPL-SCNC: 142 MMOL/L (ref 133–144)
SOURCE: ABNORMAL
SP GR UR STRIP: 1.01 (ref 1–1.03)
TROPONIN I SERPL-MCNC: 0.03 UG/L (ref 0–0.04)
UROBILINOGEN UR STRIP-MCNC: NORMAL MG/DL (ref 0–2)
WBC # BLD AUTO: 5.7 10E9/L (ref 4–11)
WBC #/AREA URNS AUTO: 1 /HPF (ref 0–2)

## 2017-12-26 PROCEDURE — 71020 XR CHEST 2 VW: CPT | Mod: TC

## 2017-12-26 PROCEDURE — 36415 COLL VENOUS BLD VENIPUNCTURE: CPT | Performed by: FAMILY MEDICINE

## 2017-12-26 PROCEDURE — 25000132 ZZH RX MED GY IP 250 OP 250 PS 637

## 2017-12-26 PROCEDURE — 93010 ELECTROCARDIOGRAM REPORT: CPT | Performed by: INTERNAL MEDICINE

## 2017-12-26 PROCEDURE — 81001 URINALYSIS AUTO W/SCOPE: CPT | Performed by: FAMILY MEDICINE

## 2017-12-26 PROCEDURE — 99285 EMERGENCY DEPT VISIT HI MDM: CPT | Mod: 25

## 2017-12-26 PROCEDURE — 85025 COMPLETE CBC W/AUTO DIFF WBC: CPT | Performed by: FAMILY MEDICINE

## 2017-12-26 PROCEDURE — 99285 EMERGENCY DEPT VISIT HI MDM: CPT | Performed by: FAMILY MEDICINE

## 2017-12-26 PROCEDURE — 93005 ELECTROCARDIOGRAM TRACING: CPT

## 2017-12-26 PROCEDURE — 84484 ASSAY OF TROPONIN QUANT: CPT | Performed by: FAMILY MEDICINE

## 2017-12-26 PROCEDURE — 83880 ASSAY OF NATRIURETIC PEPTIDE: CPT | Performed by: FAMILY MEDICINE

## 2017-12-26 PROCEDURE — 80053 COMPREHEN METABOLIC PANEL: CPT | Performed by: FAMILY MEDICINE

## 2017-12-26 RX ORDER — HYDRALAZINE HYDROCHLORIDE 25 MG/1
TABLET, FILM COATED ORAL
Status: DISCONTINUED
Start: 2017-12-26 | End: 2017-12-26 | Stop reason: WASHOUT

## 2017-12-26 RX ORDER — HYDRALAZINE HYDROCHLORIDE 25 MG/1
TABLET, FILM COATED ORAL
Status: COMPLETED
Start: 2017-12-26 | End: 2017-12-26

## 2017-12-26 RX ADMIN — HYDRALAZINE HYDROCHLORIDE 12.5 MG: 25 TABLET ORAL at 06:42

## 2017-12-26 ASSESSMENT — ENCOUNTER SYMPTOMS
PSYCHIATRIC NEGATIVE: 1
PALPITATIONS: 0
ENDOCRINE NEGATIVE: 1
DIARRHEA: 1
NEUROLOGICAL NEGATIVE: 1
CONSTITUTIONAL NEGATIVE: 1
HEMATOLOGIC/LYMPHATIC NEGATIVE: 1
SHORTNESS OF BREATH: 1
EYES NEGATIVE: 1
BACK PAIN: 1
ALLERGIC/IMMUNOLOGIC NEGATIVE: 1

## 2017-12-26 NOTE — ED NOTES
Face to face report given with opportunity to observe patient.    Report given to Nola Jolly   12/26/2017  7:15 AM

## 2017-12-26 NOTE — ED AVS SNAPSHOT
[facility]    [address]    [place] [address]    Phone:  [phone]                                       Lamont Doran   MRN: [id number]    Department:  [facility]   Date of Visit:  12/26/2017           Patient Information     Date Of Birth          1945        Your diagnoses for this visit were:     Chest pain, unspecified type     Hypertension, unspecified type        You were seen by Breonna Paul DO.      Follow-up Information     Schedule an appointment as soon as possible for a visit with Jordin Aleman    Specialty:  Internal Medicine    Contact information:    [facility]  [address]  [facility] [address]  [phone]          Discharge Instructions         Step-by-Step  Checking Your Blood Pressure    Date Last Reviewed: 4/27/2016 2000-2017 [employer]. [address] [place] [address]. All rights reserved. This information is not intended as a substitute for professional medical care. Always follow your healthcare professional's instructions.          Discharge References/Attachments     CHEST PAIN, UNCERTAIN CAUSE (ENGLISH)         Review of your medicines      Our records show that you are taking the medicines listed below. If these are incorrect, please call your family doctor or clinic.        Dose / Directions Last dose taken    ASPIRIN PO   Dose:  162 mg        Take 162 mg by mouth daily   Refills:  0        LIPITOR PO   Dose:  80 mg        Take 80 mg by mouth daily   Refills:  0        LISINOPRIL PO   Dose:  2.5 mg        Take 2.5 mg by mouth daily (with dinner)   Refills:  0        METOPROLOL TARTRATE PO   Dose:  12.5 mg        Take 12.5 mg by mouth 2 times daily   Refills:  0        OMEPRAZOLE PO        Take by mouth every morning   Refills:  0        PLAVIX PO   Dose:  75 mg        Take 75 mg by mouth daily   Refills:  0                Procedures and tests performed during your visit     CBC with  "platelets differential    Comprehensive metabolic panel    EKG 12-lead, tracing only    Nt probnp inpatient (BNP)    Troponin I    UA with Microscopic    XR Chest 2 Views      Orders Needing Specimen Collection     None      Pending Results     Date and Time Order Name Status Description    2017 0517 XR Chest 2 Views In process             Pending Culture Results     No orders found from 2017 to 2017.            Thank you for choosing [facility]       Thank you for choosing [facility] for your care. Our goal is always to provide you with excellent care. Hearing back from our patients is one way we can continue to improve our services. Please take a few minutes to complete the written survey that you may receive in the mail after you visit with us. Thank you!        [employer] Information     [employer] lets you send messages to your doctor, view your test results, renew your prescriptions, schedule appointments and more. To sign up, go to [email or web address] . Click on \"Log in\" on the left side of the screen, which will take you to the Welcome page. Then click on \"Sign up Now\" on the right side of the page.     You will be asked to enter the access code listed below, as well as some personal information. Please follow the directions to create your username and password.     Your access code is: [email or web address]  Expires: 3/26/2018  7:56 AM     Your access code will  in 90 days. If you need help or a new code, please call your [facility] clinic or [phone].        Care EveryWhere ID     This is your Care EveryWhere ID. This could be used by other organizations to access your [facility] medical records  [id number]        Equal Access to Services     MARISEL CASIANO : Hadii magen Pascual, waaxda luqadaha, qaybta kaalmatereza santana. So [facility] [phone].    ATENCIÓN: Si habla español, tiene a flannery disposición servicios gratuitos de asistencia lingüística. " Owen berrios [phone].    We comply with applicable federal civil rights laws and [place] laws. We do not discriminate on the basis of race, color, national origin, age, disability, sex, sexual orientation, or gender identity.            After Visit Summary       This is your record. Keep this with you and show to your community pharmacist(s) and doctor(s) at your next visit.

## 2017-12-26 NOTE — ED AVS SNAPSHOT
[facility]    [address]    [place] [address]    Phone:  [phone]                                       Lamont Doran   MRN: [id number]    Department:  [facility]   Date of Visit:  12/26/2017           After Visit Summary Signature Page     I have received my discharge instructions, and my questions have been answered. I have discussed any challenges I see with this plan with the nurse or doctor.    ..........................................................................................................................................  Patient/Patient Representative Signature      ..........................................................................................................................................  Patient Representative Print Name and Relationship to Patient    ..................................................               ................................................  Date                                            Time    ..........................................................................................................................................  Reviewed by Signature/Title    ...................................................              ..............................................  Date                                                            Time

## 2017-12-26 NOTE — DISCHARGE INSTRUCTIONS
Step-by-Step  Checking Your Blood Pressure    Date Last Reviewed: 4/27/2016 2000-2017 [employer]. [address], [place] [address]. All rights reserved. This information is not intended as a substitute for professional medical care. Always follow your healthcare professional's instructions.

## 2017-12-26 NOTE — ED NOTES
"Pt arrives via [facility] with report of chest pain and back pain.  Pt reports back pain for past 3 days.  Pt report hx of cardiac history with triple bypass about 5 years prior.  IV established by EMS, 12 lead completed showing RBBB.  Pt reports pain 4/10 upon arrival.  Reports \"chest pressure\".  Denies any SOB.  Pt is alert, oriented.  ASA 324MG given by EMS.    "

## 2017-12-26 NOTE — ED PROVIDER NOTES
"  History     Chief Complaint   Patient presents with     Chest Pain     cardiac hx     Back Pain     couple day hx     HPI Comments: C/o chest pain. Describes pain as heavy pressure in mid chest x tonight. Pt is worried about his heart or maybe that he is \"full of cancer\". Pt states he has had back pain over the last 2 months. He has right shoulder injury. Back pain is worse with movement. Pt has chronic SOB and diarrhea x years with no change.  Pt denies recent illness, cough, cold symptoms, fever, nausea, vomiting, or ill exposure or dizziness. He was given 4 baby aspirin in ambulance. He did not take any other pain medication.   Pt with cardiac history. Stenting in 1992, CABG x 4 vessels in 2013. Pt states he goes to cardiac rehab everyday and does not get chest pain or have any problems with that.      The history is provided by the patient. No  was used.     Lamont Doran is a 72 year old male who presents with above concerns via EMS.    Problem List:    There are no active problems to display for this patient.       Past Medical History:    No past medical history on file.    Past Surgical History:    No past surgical history on file.    Family History:    No family history on file.    Social History:  Marital Status:   [2]  Social History   Substance Use Topics     Smoking status: Not on file     Smokeless tobacco: Not on file     Alcohol use Not on file        Medications:      Clopidogrel Bisulfate (PLAVIX PO)   OMEPRAZOLE PO   METOPROLOL TARTRATE PO   LISINOPRIL PO   Atorvastatin Calcium (LIPITOR PO)   ASPIRIN PO         Review of Systems   Constitutional: Negative.    HENT: Negative.    Eyes: Negative.    Respiratory: Positive for shortness of breath.    Cardiovascular: Positive for chest pain and leg swelling. Negative for palpitations.   Gastrointestinal: Positive for diarrhea.   Endocrine: Negative.    Genitourinary: Negative.    Musculoskeletal: Positive for back pain. "   Skin: Negative.    Allergic/Immunologic: Negative.    Neurological: Negative.    Hematological: Negative.    Psychiatric/Behavioral: Negative.        Physical Exam   BP: (!) 201/90  Pulse: 74  Heart Rate: 68  Temp: 97.6  F (36.4  C)  Resp: 16  SpO2: 97 %      Physical Exam   Constitutional: He is oriented to person, place, and time. He appears well-developed and well-nourished. No distress.   HENT:   Head: Normocephalic and atraumatic.   Right Ear: External ear normal.   Left Ear: External ear normal.   Nose: Nose normal.   Mouth/Throat: Oropharynx is clear and moist. No oropharyngeal exudate.   Eyes: Conjunctivae and EOM are normal. Pupils are equal, round, and reactive to light. Right eye exhibits no discharge. Left eye exhibits no discharge. No scleral icterus.   Neck: Normal range of motion.   Cardiovascular: Normal rate, regular rhythm, normal heart sounds and intact distal pulses.  Exam reveals no gallop and no friction rub.    No murmur heard.  Pulmonary/Chest: Effort normal and breath sounds normal. No respiratory distress. He has no wheezes. He has no rales. He exhibits no tenderness.   Abdominal: Soft. He exhibits no distension and no mass. There is no tenderness. There is no rebound and no guarding.   Musculoskeletal: Normal range of motion. He exhibits edema. He exhibits no tenderness or deformity.   Trace edema bilat LE   Neurological: He is alert and oriented to person, place, and time.   Skin: Skin is warm and dry. He is not diaphoretic.   Psychiatric: He has a normal mood and affect. His behavior is normal.       ED Course     ED Course   Pt pain free on arrival to ED. Labs, chest xray and EKG obtained.  Labs and chest xray unremarkable.  EKG does not show significant changes from prior EKG x 1 year ago.  Hydralazine 12.5 mg ordered for elevated BP.  Pt denies chest pain.  Procedures               EKG Interpretation:      Interpreted by Breonna Paul  Time reviewed: immediately  Symptoms at time  of EKG: None   Rhythm: normal sinus   Rate: Normal  Axis: Normal  Ectopy: none  Conduction: right bundle branch block (complete)  ST Segments/ T Waves: No ST-T wave changes and T wave inversion III, aVF and V1  Q Waves: none  Comparison to prior: Unchanged from 1/2/2107    Clinical Impression: abnormal EKG                  Critical Care time:             Labs Ordered and Resulted from Time of ED Arrival Up to the Time of Departure from the ED   ROUTINE UA WITH MICROSCOPIC - Abnormal; Notable for the following:        Result Value    Protein Albumin Urine 30 (*)     Bacteria Urine None (*)     Mucous Urine Present (*)     All other components within normal limits   CBC WITH PLATELETS DIFFERENTIAL - Abnormal; Notable for the following:     Platelet Count 134 (*)     All other components within normal limits   COMPREHENSIVE METABOLIC PANEL - Abnormal; Notable for the following:     Potassium 3.3 (*)     Glucose 107 (*)     Creatinine 0.64 (*)     Calcium 7.9 (*)     Albumin 2.6 (*)     Protein Total 6.6 (*)     All other components within normal limits   NT PROBNP INPATIENT   TROPONIN I       Assessments & Plan (with Medical Decision Making)     I have reviewed the nursing notes.    I have reviewed the findings, diagnosis, plan and need for follow up with the patient.  Chest pain. Hypertension.  F/u with PCP this week. Rest, fluids. C/w current medications.  Return to ED if symptoms b/c acutely worse.    New Prescriptions    No medications on file       Final diagnoses:   Chest pain, unspecified type   Hypertension, unspecified type       12/26/2017   [facility]     Breonna Paul,   12/26/17 0746

## 2018-05-29 ENCOUNTER — HOSPITAL ENCOUNTER (EMERGENCY)
Facility: HOSPITAL | Age: 73
Discharge: HOME | End: 2018-05-29
Attending: PHYSICIAN ASSISTANT | Admitting: PHYSICIAN ASSISTANT
Payer: COMMERCIAL

## 2018-05-29 VITALS
DIASTOLIC BLOOD PRESSURE: 74 MMHG | TEMPERATURE: 96 F | SYSTOLIC BLOOD PRESSURE: 146 MMHG | OXYGEN SATURATION: 94 % | RESPIRATION RATE: 16 BRPM

## 2018-05-29 DIAGNOSIS — S50.11XA CONTUSION OF RIGHT FOREARM, INITIAL ENCOUNTER: ICD-10-CM

## 2018-05-29 DIAGNOSIS — Z79.01 LONG TERM CURRENT USE OF ANTICOAGULANT THERAPY: ICD-10-CM

## 2018-05-29 PROCEDURE — G0463 HOSPITAL OUTPT CLINIC VISIT: HCPCS

## 2018-05-29 PROCEDURE — 99213 OFFICE O/P EST LOW 20 MIN: CPT | Performed by: PHYSICIAN ASSISTANT

## 2018-05-29 ASSESSMENT — ENCOUNTER SYMPTOMS
WOUND: 1
NECK STIFFNESS: 0
PSYCHIATRIC NEGATIVE: 1
COLOR CHANGE: 1
CONSTITUTIONAL NEGATIVE: 1
CARDIOVASCULAR NEGATIVE: 1
NEUROLOGICAL NEGATIVE: 1
NECK PAIN: 0

## 2018-05-29 NOTE — ED AVS SNAPSHOT
[facility]    [address] [address]    Phone:  [phone]                                       Lamont Doran   MRN: [id number]    Department:  [facility]   Date of Visit:  5/29/2018           After Visit Summary Signature Page     I have received my discharge instructions, and my questions have been answered. I have discussed any challenges I see with this plan with the nurse or doctor.    ..........................................................................................................................................  Patient/Patient Representative Signature      ..........................................................................................................................................  Patient Representative Print Name and Relationship to Patient    ..................................................               ................................................  Date                                            Time    ..........................................................................................................................................  Reviewed by Signature/Title    ...................................................              ..............................................  Date                                                            Time

## 2018-05-29 NOTE — ED AVS SNAPSHOT
[facility]    [address] [address]    Phone:  [phone]                                       Lamont Doran   MRN: [id number]    Department:  [facility]   Date of Visit:  5/29/2018           Patient Information     Date Of Birth          1945        Your diagnoses for this visit were:     Contusion of right forearm, initial encounter     Long term current use of anticoagulant therapy        You were seen by Melanie Alejo PA.      Follow-up Information     Follow up with Jordin Aleman    Specialty:  Internal Medicine    Why:  If symptoms worsen    Contact information:    [facility]  [address]  Critical access hospital [address] [phone]          Follow up with [facility].    Specialty:  EMERGENCY MEDICINE    Why:  If redness/right hand tingling/numbness or further concerns develop    Contact information:    [address] [place] [id number] [phone]    Additional information:    From [place]: Take [address]. Turn left at [address]. Turn left at the first stoplight on [address]. At the first stop sign, take a right onto [address]. Take a left into the parking lot and continue through until you reach the North enterance of the building.       From [place]: Take [address]. Take the [address] ramp towards [place]. Turn left onto [address]. Take a slight right onto [address] [facility]. Turn left at the first stoplight on [address]. At the first stop sign, take a right onto [address]. Take a left into the parking lot and continue through until you reach the North enterance of the building.       From [place]: Take [address]. Take a right at [address]. At the first stop sign, take a right onto [address]. Take a left into the parking lot and continue through until you reach the North enterance of the building.       Discharge  "References/Attachments     BONE BRUISE (BONE CONTUSION), UNDERSTANDING (ENGLISH)    SOFT TISSUE CONTUSION (ENGLISH)         Review of your medicines      Our records show that you are taking the medicines listed below. If these are incorrect, please call your family doctor or clinic.        Dose / Directions Last dose taken    ASPIRIN PO   Dose:  162 mg        Take 162 mg by mouth daily   Refills:  0        LIPITOR PO   Dose:  80 mg        Take 80 mg by mouth daily   Refills:  0        LISINOPRIL PO   Dose:  2.5 mg        Take 2.5 mg by mouth daily (with dinner)   Refills:  0        METOPROLOL TARTRATE PO   Dose:  12.5 mg        Take 12.5 mg by mouth 2 times daily   Refills:  0        OMEPRAZOLE PO        Take by mouth every morning   Refills:  0        PLAVIX PO   Dose:  75 mg        Take 75 mg by mouth daily   Refills:  0                Orders Needing Specimen Collection     None      Pending Results     No orders found from 5/27/2018 to 5/30/2018.            Pending Culture Results     No orders found from 5/27/2018 to 5/30/2018.            Thank you for choosing [facility]       Thank you for choosing [facility] for your care. Our goal is always to provide you with excellent care. Hearing back from our patients is one way we can continue to improve our services. Please take a few minutes to complete the written survey that you may receive in the mail after you visit with us. Thank you!        [employer] Information     [email or web address] lets you send messages to your doctor, view your test results, renew your prescriptions, schedule appointments and more. To sign up, go to [email or web address] . Click on \"Log in\" on the left side of the screen, which will take you to the Welcome page. Then click on \"Sign up Now\" on the right side of the page.     You will be asked to enter the access code listed below, as well as some personal information. Please follow the directions to create your username and password.     Your access " code is: [email or web address]  Expires: 2018  7:39 PM     Your access code will  in 90 days. If you need help or a new code, please call your [facility] clinic or [phone].        Care EveryWhere ID     This is your Care EveryWhere ID. This could be used by other organizations to access your [facility] medical records  [id number]        Equal Access to Services     MARISEL CASIANO : Hadii magen matoso Solaliali, waaxda luqadaha, qaybta kaalmada adejacquelineyada, tereza carmona. So [facility] [phone].    ATENCIÓN: Si habla omar, tiene a flannery disposición servicios gratuitos de asistencia lingüística. Llame al [phone].    We comply with applicable federal civil rights laws and [place] laws. We do not discriminate on the basis of race, color, national origin, age, disability, sex, sexual orientation, or gender identity.            After Visit Summary       This is your record. Keep this with you and show to your community pharmacist(s) and doctor(s) at your next visit.

## 2018-05-30 NOTE — ED PROVIDER NOTES
History     Chief Complaint   Patient presents with     bruise     rt lower arm buise, notes fell 1 week ago, denies pain but concerned about the bruising     The history is provided by the patient. No  was used.     Lamont Doran is a 72 year old male who has bruising to right elbow/forearm x 1 week. States 1 week ago he fell and landed on right elbow. Denies any head injury. No neck pain.  He has no right elbow/forearm pain. No loss of ROM. He just wants to make sure he's ok, because he is on Plavix        Past Medical History:    Long Term Anticoagulant use  Carotid Artery Occlusion  CVA    Past Surgical History:    History reviewed. No pertinent surgical history.    Family History:    Not pertinent      Social History:  Marital Status:   [2]  Social History   Substance Use Topics     Smoking status: Not on file     Smokeless tobacco: Not on file     Alcohol use Not on file        Medications:      ASPIRIN PO   Atorvastatin Calcium (LIPITOR PO)   Clopidogrel Bisulfate (PLAVIX PO)   LISINOPRIL PO   METOPROLOL TARTRATE PO   OMEPRAZOLE PO         Review of Systems   Constitutional: Negative.    Cardiovascular: Negative.    Musculoskeletal: Negative for neck pain and neck stiffness.   Skin: Positive for color change and wound.   Neurological: Negative.    Psychiatric/Behavioral: Negative.        Physical Exam   BP: 146/74  Heart Rate: 102  Temp: 96  F (35.6  C)  Resp: 16  SpO2: 94 %      Physical Exam   Constitutional: He is oriented to person, place, and time. He appears well-developed and well-nourished. No distress.   Cardiovascular:   tachycardia   Pulmonary/Chest: Effort normal.   Musculoskeletal:   Right forearm: +AFROM, 5/5 strength, m/n/v intact. No erythema. NO TTP. There is diffuse moderate ecchymosis at different stages of resolving. Edema over elbow bursa area. No TTP. Mild edema extending to the wrist.   Neurological: He is alert and oriented to person, place, and time.    Skin: He is not diaphoretic.   Psychiatric: He has a normal mood and affect.   Nursing note and vitals reviewed.      ED Course     ED Course     Procedures          Assessments & Plan (with Medical Decision Making)     I have reviewed the nursing notes.    I have reviewed the findings, diagnosis, plan and need for follow up with the patient.          Final diagnoses:   Contusion of right forearm, initial encounter   Long term current use of anticoagulant therapy         Patient verbally educated and given appropriate education sheets for the diagnoses and has no questions.  Take current medications as directed.   Follow up with your Primary Care provider if symptoms increase,  or if fever, right hand tingling/numbness or further concerns develop, return to the ER  Melanie Alejo Certified  Physician Assistant  5/29/2018  8:02 PM  URGENT CARE CLINIC      5/29/2018   [facility]     Melanie Alejo PA  05/29/18 2004

## 2018-05-30 NOTE — ED TRIAGE NOTES
Pt presents today with c/o fell 1 week ago. Pt denies any pain but is concerned about his Bruising, and bulge on his elbow. Pt's forearm is noticeably bruised.

## 2018-11-23 ENCOUNTER — HOSPITAL ENCOUNTER (OUTPATIENT)
Dept: MRI IMAGING | Facility: HOSPITAL | Age: 73
Discharge: HOME | End: 2018-11-23
Attending: INTERNAL MEDICINE | Admitting: INTERNAL MEDICINE
Payer: COMMERCIAL

## 2018-11-23 DIAGNOSIS — M54.32 SCIATICA OF LEFT SIDE: ICD-10-CM

## 2018-11-23 PROCEDURE — 72148 MRI LUMBAR SPINE W/O DYE: CPT | Mod: TC

## 2018-12-04 ENCOUNTER — TRANSFERRED RECORDS (OUTPATIENT)
Dept: HEALTH INFORMATION MANAGEMENT | Facility: CLINIC | Age: 73
End: 2018-12-04

## 2018-12-14 ENCOUNTER — HOSPITAL ENCOUNTER (OUTPATIENT)
Dept: INTERVENTIONAL RADIOLOGY/VASCULAR | Facility: HOSPITAL | Age: 73
Discharge: HOME | End: 2018-12-14
Attending: INTERNAL MEDICINE | Admitting: NEUROLOGICAL SURGERY
Payer: COMMERCIAL

## 2018-12-14 DIAGNOSIS — R20.0 NUMBNESS IN LEFT LEG: ICD-10-CM

## 2018-12-14 DIAGNOSIS — M54.16 LEFT LUMBAR RADICULITIS: ICD-10-CM

## 2018-12-14 PROCEDURE — 25000128 H RX IP 250 OP 636: Performed by: RADIOLOGY

## 2018-12-14 PROCEDURE — 64483 NJX AA&/STRD TFRM EPI L/S 1: CPT | Mod: TC

## 2018-12-14 RX ORDER — DEXAMETHASONE SODIUM PHOSPHATE 10 MG/ML
10 INJECTION, SOLUTION INTRAMUSCULAR; INTRAVENOUS ONCE
Status: COMPLETED | OUTPATIENT
Start: 2018-12-14 | End: 2018-12-14

## 2018-12-14 RX ORDER — DEXAMETHASONE SODIUM PHOSPHATE 10 MG/ML
INJECTION, SOLUTION INTRAMUSCULAR; INTRAVENOUS
Status: DISCONTINUED
Start: 2018-12-14 | End: 2018-12-15 | Stop reason: HOSPADM

## 2018-12-14 RX ORDER — IOPAMIDOL 612 MG/ML
15 INJECTION, SOLUTION INTRATHECAL ONCE
Status: COMPLETED | OUTPATIENT
Start: 2018-12-14 | End: 2018-12-14

## 2018-12-14 RX ADMIN — DEXAMETHASONE SODIUM PHOSPHATE 10 MG: 10 INJECTION, SOLUTION INTRAMUSCULAR; INTRAVENOUS at 13:06

## 2018-12-14 RX ADMIN — IOPAMIDOL 2 ML: 612 INJECTION, SOLUTION INTRATHECAL at 13:06

## 2018-12-14 NOTE — IP AVS SNAPSHOT
[facility]  [address] [address]  Phone:  [phone]  Fax:  [phone]                                    After Visit Summary   12/14/2018    Lamont Doran    MRN: [id number]           After Visit Summary Signature Page    I have received my discharge instructions, and my questions have been answered. I have discussed any challenges I see with this plan with the nurse or doctor.    ..........................................................................................................................................  Patient/Patient Representative Signature      ..........................................................................................................................................  Patient Representative Print Name and Relationship to Patient    ..................................................               ................................................  Date                                   Time    ..........................................................................................................................................  Reviewed by Signature/Title    ...................................................              ..............................................  Date                                               Time          22EPIC Rev 08/18

## 2018-12-14 NOTE — DISCHARGE INSTRUCTIONS
Home number on file [phone] (home)  Is it ok to leave a message at this number(s)? Yes    Dr. Guy completed your procedure on 12/14/2018.    Current Pain Level (0-10 Scale): 2/10  Post Pain Level (0-10):  0/10    Radiology Discharge instructions for Steroid Injection    Activity Level:     Do not do any heavy activity or exercise for 24 hours.   Do not drive for 4 hours after your injection.  Diet:   Return to your normal diet.  Medications:   If you have stopped taking your Aspirin, Coumadin/Warfarin, Ibuprofen, or any   other blood thinner for this procedure you may resume in the morning unless   your primary care provider has given you other instructions.    Diabetics may see an increase in blood sugar after steroid injections. If you are concerned about your blood sugar, please contact your family doctor.    Site Care:  Remove the bandage and bathe or shower the morning after the procedure.      Please allow two weeks to experience improvement in your pain.  If you have any further issues, please contact your provider.    Call your Primary Care Provider if you have the following (if your primary care provider is not available please seek emergency care):   Nausea with vomiting   Severe headache   Drowsiness or confusion   Redness or drainage at the injection or puncture site   Temperature over 101 degrees F   Other concerns   Worsening back pain   Stiff neck

## 2018-12-28 ENCOUNTER — TELEPHONE (OUTPATIENT)
Dept: INTERVENTIONAL RADIOLOGY/VASCULAR | Facility: HOSPITAL | Age: 73
End: 2018-12-28

## 2018-12-28 NOTE — TELEPHONE ENCOUNTER
INJECTION POST CALL    Procedure: Epidural  Radiologist(s): Dr. Serg Guy  Date of Procedure:  12-14-18    The patient had no questions or concerns.    Pre-procedure pain score was: 2 (See pre-procedure score)  Post-procedure pain score as of today is: 0  Where is the pain located? Left leg still weak and numb and some low back pain if standing in one place for to long      Is this new pain? No    January 3rd pt is seeing a neurologist to see if they have a solution for the numb/week left leg      Patient will contact the provider if there are any issues.      Gracie Pelayo

## 2019-01-01 ENCOUNTER — TRANSFERRED RECORDS (OUTPATIENT)
Dept: HEALTH INFORMATION MANAGEMENT | Facility: HOSPITAL | Age: 74
End: 2019-01-01

## 2019-01-01 ENCOUNTER — HOSPITAL ENCOUNTER (INPATIENT)
Facility: HOSPITAL | Age: 74
Setting detail: SURGERY ADMIT
End: 2019-01-01
Attending: ORTHOPAEDIC SURGERY | Admitting: ORTHOPAEDIC SURGERY
Payer: COMMERCIAL

## 2019-01-01 ENCOUNTER — HOSPITAL ENCOUNTER (EMERGENCY)
Facility: HOSPITAL | Age: 74
Discharge: HOME | End: 2019-10-19
Attending: FAMILY MEDICINE | Admitting: FAMILY MEDICINE
Payer: COMMERCIAL

## 2019-01-01 ENCOUNTER — APPOINTMENT (OUTPATIENT)
Dept: CT IMAGING | Facility: HOSPITAL | Age: 74
End: 2019-01-01
Attending: NURSE PRACTITIONER
Payer: COMMERCIAL

## 2019-01-01 ENCOUNTER — APPOINTMENT (OUTPATIENT)
Dept: CT IMAGING | Facility: HOSPITAL | Age: 74
End: 2019-01-01
Attending: PHYSICIAN ASSISTANT
Payer: COMMERCIAL

## 2019-01-01 ENCOUNTER — HOSPITAL ENCOUNTER (OUTPATIENT)
Dept: CT IMAGING | Facility: HOSPITAL | Age: 74
Discharge: HOME | End: 2019-12-17
Attending: INTERNAL MEDICINE | Admitting: INTERNAL MEDICINE
Payer: COMMERCIAL

## 2019-01-01 ENCOUNTER — HOSPITAL ENCOUNTER (EMERGENCY)
Facility: HOSPITAL | Age: 74
Discharge: HOME | End: 2019-10-21
Attending: NURSE PRACTITIONER | Admitting: NURSE PRACTITIONER
Payer: COMMERCIAL

## 2019-01-01 ENCOUNTER — HOSPITAL ENCOUNTER (OUTPATIENT)
Dept: RESPIRATORY THERAPY | Facility: HOSPITAL | Age: 74
Discharge: HOME | End: 2019-12-18
Attending: INTERNAL MEDICINE | Admitting: INTERNAL MEDICINE
Payer: COMMERCIAL

## 2019-01-01 VITALS
DIASTOLIC BLOOD PRESSURE: 55 MMHG | SYSTOLIC BLOOD PRESSURE: 122 MMHG | TEMPERATURE: 98.1 F | OXYGEN SATURATION: 92 % | RESPIRATION RATE: 16 BRPM

## 2019-01-01 VITALS
RESPIRATION RATE: 16 BRPM | TEMPERATURE: 97.4 F | SYSTOLIC BLOOD PRESSURE: 178 MMHG | OXYGEN SATURATION: 93 % | DIASTOLIC BLOOD PRESSURE: 89 MMHG
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
## 2019-01-01 DIAGNOSIS — S10.93XA CONTUSION OF FACE, SCALP AND NECK, INITIAL ENCOUNTER: ICD-10-CM

## 2019-01-01 DIAGNOSIS — S00.03XA CONTUSION OF FACE, SCALP AND NECK, INITIAL ENCOUNTER: ICD-10-CM

## 2019-01-01 DIAGNOSIS — S09.90XA CLOSED HEAD INJURY, INITIAL ENCOUNTER: ICD-10-CM

## 2019-01-01 DIAGNOSIS — R04.2 HEMOPTYSIS: ICD-10-CM

## 2019-01-01 DIAGNOSIS — S00.83XA CONTUSION OF FACE, SCALP AND NECK, INITIAL ENCOUNTER: ICD-10-CM

## 2019-01-01 DIAGNOSIS — S01.00XA OPEN WOUND OF SCALP, UNSPECIFIED OPEN WOUND TYPE, INITIAL ENCOUNTER: ICD-10-CM

## 2019-01-01 LAB
ANION GAP SERPL CALCULATED.3IONS-SCNC: 5 MMOL/L (ref 3–14)
APTT PPP: 25 SEC (ref 24–37)
BASOPHILS # BLD AUTO: 0 10E9/L (ref 0–0.2)
BASOPHILS NFR BLD AUTO: 0.4 %
BUN SERPL-MCNC: 18 MG/DL (ref 7–30)
CALCIUM SERPL-MCNC: 8 MG/DL (ref 8.5–10.1)
CHLORIDE SERPL-SCNC: 109 MMOL/L (ref 94–109)
CO2 SERPL-SCNC: 28 MMOL/L (ref 20–32)
COHGB MFR BLD: 1.5 % (ref 0–2)
CREAT SERPL-MCNC: 0.86 MG/DL (ref 0.66–1.25)
DIFFERENTIAL METHOD BLD: ABNORMAL
EOSINOPHIL # BLD AUTO: 0.1 10E9/L (ref 0–0.7)
EOSINOPHIL NFR BLD AUTO: 1.5 %
ERYTHROCYTE [DISTWIDTH] IN BLOOD BY AUTOMATED COUNT: 15.1 % (ref 10–15)
ETHANOL SERPL-MCNC: <0.01 G/DL
GFR SERPL CREATININE-BSD FRML MDRD: 84 ML/MIN/{1.73_M2}
GLUCOSE BLDC GLUCOMTR-MCNC: 88 MG/DL (ref 70–99)
GLUCOSE SERPL-MCNC: 96 MG/DL (ref 70–99)
HCT VFR BLD AUTO: 42.5 % (ref 40–53)
HGB BLD-MCNC: 13.6 G/DL (ref 13.3–17.7)
HGB BLD-MCNC: 14.1 G/DL (ref 13.3–17.7)
IMM GRANULOCYTES # BLD: 0 10E9/L (ref 0–0.4)
IMM GRANULOCYTES NFR BLD: 0.3 %
LYMPHOCYTES # BLD AUTO: 1.1 10E9/L (ref 0.8–5.3)
LYMPHOCYTES NFR BLD AUTO: 16.2 %
MCH RBC QN AUTO: 27.8 PG (ref 26.5–33)
MCHC RBC AUTO-ENTMCNC: 32 G/DL (ref 31.5–36.5)
MCV RBC AUTO: 87 FL (ref 78–100)
MONOCYTES # BLD AUTO: 0.6 10E9/L (ref 0–1.3)
MONOCYTES NFR BLD AUTO: 9.1 %
NEUTROPHILS # BLD AUTO: 4.8 10E9/L (ref 1.6–8.3)
NEUTROPHILS NFR BLD AUTO: 72.5 %
NRBC # BLD AUTO: 0 10*3/UL
NRBC BLD AUTO-RTO: 0 /100
PLATELET # BLD AUTO: 145 10E9/L (ref 150–450)
POTASSIUM SERPL-SCNC: 3.5 MMOL/L (ref 3.4–5.3)
RBC # BLD AUTO: 4.9 10E12/L (ref 4.4–5.9)
SODIUM SERPL-SCNC: 142 MMOL/L (ref 133–144)
WBC # BLD AUTO: 6.7 10E9/L (ref 4–11)

## 2019-01-01 PROCEDURE — 82375 ASSAY CARBOXYHB QUANT: CPT | Performed by: INTERNAL MEDICINE

## 2019-01-01 PROCEDURE — 85018 HEMOGLOBIN: CPT | Performed by: INTERNAL MEDICINE

## 2019-01-01 PROCEDURE — 93010 ELECTROCARDIOGRAM REPORT: CPT | Performed by: INTERNAL MEDICINE

## 2019-01-01 PROCEDURE — 85730 THROMBOPLASTIN TIME PARTIAL: CPT | Performed by: PHYSICIAN ASSISTANT

## 2019-01-01 PROCEDURE — 25000125 ZZHC RX 250

## 2019-01-01 PROCEDURE — 94726 PLETHYSMOGRAPHY LUNG VOLUMES: CPT

## 2019-01-01 PROCEDURE — 80048 BASIC METABOLIC PNL TOTAL CA: CPT | Performed by: PHYSICIAN ASSISTANT

## 2019-01-01 PROCEDURE — 94729 DIFFUSING CAPACITY: CPT | Mod: 26 | Performed by: INTERNAL MEDICINE

## 2019-01-01 PROCEDURE — 94729 DIFFUSING CAPACITY: CPT

## 2019-01-01 PROCEDURE — 94060 EVALUATION OF WHEEZING: CPT | Mod: 26 | Performed by: INTERNAL MEDICINE

## 2019-01-01 PROCEDURE — 80320 DRUG SCREEN QUANTALCOHOLS: CPT | Performed by: PHYSICIAN ASSISTANT

## 2019-01-01 PROCEDURE — 93005 ELECTROCARDIOGRAM TRACING: CPT

## 2019-01-01 PROCEDURE — 94726 PLETHYSMOGRAPHY LUNG VOLUMES: CPT | Mod: 26 | Performed by: INTERNAL MEDICINE

## 2019-01-01 PROCEDURE — 00000146 ZZHCL STATISTIC GLUCOSE BY METER IP

## 2019-01-01 PROCEDURE — 70486 CT MAXILLOFACIAL W/O DYE: CPT | Mod: TC

## 2019-01-01 PROCEDURE — 85025 COMPLETE CBC W/AUTO DIFF WBC: CPT | Performed by: PHYSICIAN ASSISTANT

## 2019-01-01 PROCEDURE — 36415 COLL VENOUS BLD VENIPUNCTURE: CPT | Performed by: INTERNAL MEDICINE

## 2019-01-01 PROCEDURE — 72125 CT NECK SPINE W/O DYE: CPT | Mod: TC

## 2019-01-01 PROCEDURE — 70450 CT HEAD/BRAIN W/O DYE: CPT | Mod: TC

## 2019-01-01 PROCEDURE — 94060 EVALUATION OF WHEEZING: CPT

## 2019-01-01 PROCEDURE — 36415 COLL VENOUS BLD VENIPUNCTURE: CPT | Performed by: PHYSICIAN ASSISTANT

## 2019-01-01 PROCEDURE — 99285 EMERGENCY DEPT VISIT HI MDM: CPT | Mod: 25

## 2019-01-01 PROCEDURE — 99284 EMERGENCY DEPT VISIT MOD MDM: CPT | Mod: 25

## 2019-01-01 PROCEDURE — 71250 CT THORAX DX C-: CPT | Mod: TC

## 2019-01-01 PROCEDURE — 99284 EMERGENCY DEPT VISIT MOD MDM: CPT | Mod: Z6 | Performed by: FAMILY MEDICINE

## 2019-01-01 PROCEDURE — 99283 EMERGENCY DEPT VISIT LOW MDM: CPT | Mod: Z6 | Performed by: NURSE PRACTITIONER

## 2019-01-01 RX ORDER — FUROSEMIDE 20 MG
20 TABLET ORAL DAILY
COMMUNITY
End: 2020-01-01 | Stop reason: DRUGHIGH

## 2019-01-01 RX ORDER — ALBUTEROL SULFATE 0.83 MG/ML
2.5 SOLUTION RESPIRATORY (INHALATION)
Status: COMPLETED | OUTPATIENT
Start: 2019-01-01 | End: 2019-01-01

## 2019-01-01 RX ADMIN — ALBUTEROL SULFATE 2.5 MG: 2.5 SOLUTION RESPIRATORY (INHALATION) at 15:02

## 2019-01-01 ASSESSMENT — ENCOUNTER SYMPTOMS
PSYCHIATRIC NEGATIVE: 1
CARDIOVASCULAR NEGATIVE: 1
EYES NEGATIVE: 1
MUSCULOSKELETAL NEGATIVE: 1
PSYCHIATRIC NEGATIVE: 1
ALLERGIC/IMMUNOLOGIC NEGATIVE: 1
HEMATOLOGIC/LYMPHATIC NEGATIVE: 1
GASTROINTESTINAL NEGATIVE: 1
CONSTITUTIONAL NEGATIVE: 1
WOUND: 1
RESPIRATORY NEGATIVE: 1
GASTROINTESTINAL NEGATIVE: 1
CARDIOVASCULAR NEGATIVE: 1
HEADACHES: 1
NEUROLOGICAL NEGATIVE: 1
FACIAL SWELLING: 1
ENDOCRINE NEGATIVE: 1
CONSTITUTIONAL NEGATIVE: 1
BACK PAIN: 1
RESPIRATORY NEGATIVE: 1
EYES NEGATIVE: 1

## 2019-02-28 ENCOUNTER — HOSPITAL ENCOUNTER (OUTPATIENT)
Dept: PHYSICAL THERAPY | Facility: HOSPITAL | Age: 74
Setting detail: THERAPIES SERIES
End: 2019-02-28
Attending: INTERNAL MEDICINE
Payer: COMMERCIAL

## 2019-02-28 PROCEDURE — 97530 THERAPEUTIC ACTIVITIES: CPT | Mod: GP

## 2019-02-28 PROCEDURE — 97140 MANUAL THERAPY 1/> REGIONS: CPT | Mod: GP

## 2019-02-28 PROCEDURE — 97161 PT EVAL LOW COMPLEX 20 MIN: CPT | Mod: GP

## 2019-02-28 PROCEDURE — 97110 THERAPEUTIC EXERCISES: CPT | Mod: GP

## 2019-02-28 NOTE — PROGRESS NOTES
Initial Physical Therapy Evaluations       Name: Lamont Doran MRN# [id number]   Age: 73 year old YOB: 1945     Date of Consultation: February 28, 2019  Primary care provider: Jordin Aleman    Referring Physician: Dr. Garza  Orders: Eval and Treat  Medical Diagnosis:   Onset of Illness/Injury: chronic. Ongoing for 10+ years with recent increase in the last several months.   Insurance: [employer]    Reason for PT Visit: Has low back pain for 10 years. Symptoms have increased in the last year with increased L LE pain, numbness in L glut and L LE weakness that impacts transfers and walking. Patient has increased symptoms with prolonged standing. Strength deficits/weakness are most prevalent after long drives and/or prolonged sitting.     Patient is also having L knee pain symptoms at back of knee. Originally was thinking total joint replacement and is now leaning toward a cortisone injection. Symptoms are occasionally at front knee cap, but are more commonly located at the back of the knee.   Patient was scheduled for knee surgery and then decided that it may have been related to another cause.     Patient participates in regular cardiovascular exercise at cardiac rehab using seated elliptical, Nu step, arm bike. Hx for cardiac conditions and procedures.     Prior Level of Function: Increasing pain in past year in both LB, L LE and L knee.      Community Support/Living Environment/Employment History: retired     Patient/Family Goal: decrease pain in back and knee, increase strength in L Leg, walk upright. Patient notes he has a tendency to lean and hunch over with walking. Has to use a cane first thing in the morning and when back is flared.     Fall Screen:   Have you fallen 2 or more times in the last year? No  Have you fallen and had an injury in the last year? No  Timed up & go: NT  Is patient a fall risk? No    Past Medical History:   No past medical history on file.    Past Surgical History:  No  past surgical history on file.    Medications:   Current Outpatient Medications   Medication Sig     ASPIRIN PO Take 162 mg by mouth daily      Atorvastatin Calcium (LIPITOR PO) Take 80 mg by mouth daily      Clopidogrel Bisulfate (PLAVIX PO) Take 75 mg by mouth daily     LISINOPRIL PO Take 2.5 mg by mouth daily (with dinner)      METOPROLOL TARTRATE PO Take 12.5 mg by mouth 2 times daily      OMEPRAZOLE PO Take by mouth every morning     No current facility-administered medications for this encounter.        Imaging:     Musculoskeletal Findings:      OBJECTIVE  Pain at rest: 0/10  Pain with activity: 10/10  Aggravating Factors: aching. Prolonged standing, transfers  Relieving Factors: sitting, OTC medications     Change in bowel/bladder: No  Numbness or tingling in groin area: No    Palpation: hypomobility present throughout thoracolumbar segments with T8 and lower lumbar segments most greatly impacted.      Gait: shuffled. Decreased confidence on L LE.    Assistive devices: comes in today without assistive device, but occasionally uses SPC and uses SPC more often first thing in the morning.      Posture: Moderately stooped posture, slightly leaning to one side.  Sitting Posture: poor  Standing Posture: poor  Correction of posture: same. Difficult to correct to neutral significant tension and loss of segmental mobility present, but did improve with exercise and positioning techniques.    Neurological Assessment:   Reflexes - Right:   Patellar: Normal   Achilles: Normal   Reflexes - Left:   Patellar: Normal   Achilles: Normal      Myotomes:   Right lower extremity:negative  Left lower extremity: DF weakness present and seems to be of myotomal origin. 4- to 4/5. L4/L5 likely involved.       Dermatomes:   Right lower extremity: negative  Left lower extremity: negative     Range of Motion:  Active Lumbar Spine       Flexion: fingertips to proximal tibia       Extension: 20% with significant segmental hypomobility        Right sidebend: Limited and guarded        Left sidebend: Limited and guarded.       Left Lower Extremity Range of Motion: Limited L knee extension. Posterior capsular tightness and HS tension limiting motion. Possibly some component of dural tension.      Strength:    Right Lower Extremity Strength: no significant deficits other than generalized core and glut weakness.    Left Lower Extremity Strength: 4- to 4/5 DF, general glut and core weakness. No other significant findings.      Special Tests:    Repeated Movement Testing: Improved segmental mobility following extension to neutral and mini standing back bends. No change in DF strength.      Passive Intervertebral Accessory Movements:  grossly limited segmental mobility throughout thoracic and lumbar regions.       Outcome Measures:   Evelyn STarT    Evelyn STarT  1 low  Oswestry Score (%): 24 %     Goals:   STG 1: Patient will be independent and compliant with HEP and postural support use to prevent cumulative stress to LB.     LTG 1: Patient will be able to consistently perform transfers and community distance ambulation with good strength and confidence in L LE without limitation from LBP.   LTG 2: Patient will be able to consistently stand for greater than 20 min without limitation from LBP with good tolerance during and after performance such as when washing dishes or working on progress.   LTG 3: Patient will display increased L LE strength to 4 to 4+/5 allowing greater strength with gait, transfers, bending, carrying.       Planned Interventions: therapeutic exercise with MDT focus, postural education, strengthening of LE, core, gluts. Manual techniques. Mechanical traction if needed.     Clinical Impressions:  Criteria for Skilled Therapeutic Intervention Met: Yes   PT Diagnosis: Patient presents with exacerbation of chronic back pain with pain and weakness into L LE as well as likely unrelated L knee pain and limited motion.   Influenced by the following  impairments: pain, weakness, restriction of mobility, gait dysfunction, postural dysfunction  Functional limitations due to impairment: Pain with prolonged standing, stiff when getting out of bed, limiting and intermittent use of SPC for balance and support  Clinical presentation: Evolving/Changing  Clinical presentation rationale: clinical judgement, multiple regions involved with related or unrelated features  Clinical Decision making (complexity): Moderate Complexity  Predicted Duration of Therapy Intervention (days/wks): 90 days  Risks and Benefits of therapy have been explained: Yes  Patient, Family & other staff in agreement with plan of care: Yes  Comments: PT services will address both back pain, L LE symptoms and restricted mobility and pain at L knee. Patient has chronic back pain with postural dysfunction and large loss of segmental mobility. Patient also lacks full L knee extension and has pain. These factors create an antalgic gait pattern, contribute to a forward flexed posture and limit tolerance to daily activities in home and community. Patient would greatly benefit from PT involvement.   Total Evaluation Time: 25    I certify the need for these services furnished under this plan of treatment and while under my care. (Physician co-signature of this document indicates review and certification of the therapy plan).      _____________________________     __________________________    ____________  Physician's Signature                 Date               Time

## 2019-03-04 ENCOUNTER — HOSPITAL ENCOUNTER (OUTPATIENT)
Dept: PHYSICAL THERAPY | Facility: HOSPITAL | Age: 74
Setting detail: THERAPIES SERIES
End: 2019-03-04
Attending: INTERNAL MEDICINE
Payer: COMMERCIAL

## 2019-03-04 PROCEDURE — 97110 THERAPEUTIC EXERCISES: CPT | Mod: GP

## 2019-03-04 PROCEDURE — 97140 MANUAL THERAPY 1/> REGIONS: CPT | Mod: GP

## 2019-03-11 ENCOUNTER — HOSPITAL ENCOUNTER (OUTPATIENT)
Dept: PHYSICAL THERAPY | Facility: HOSPITAL | Age: 74
Setting detail: THERAPIES SERIES
End: 2019-03-11
Attending: INTERNAL MEDICINE
Payer: COMMERCIAL

## 2019-03-11 PROCEDURE — 97140 MANUAL THERAPY 1/> REGIONS: CPT | Mod: GP

## 2019-03-14 ENCOUNTER — HOSPITAL ENCOUNTER (OUTPATIENT)
Dept: PHYSICAL THERAPY | Facility: HOSPITAL | Age: 74
Setting detail: THERAPIES SERIES
End: 2019-03-14
Attending: INTERNAL MEDICINE
Payer: COMMERCIAL

## 2019-03-14 PROCEDURE — 97110 THERAPEUTIC EXERCISES: CPT | Mod: GP

## 2019-03-18 ENCOUNTER — HOSPITAL ENCOUNTER (OUTPATIENT)
Dept: PHYSICAL THERAPY | Facility: HOSPITAL | Age: 74
Setting detail: THERAPIES SERIES
End: 2019-03-18
Attending: INTERNAL MEDICINE
Payer: COMMERCIAL

## 2019-03-18 PROCEDURE — 97110 THERAPEUTIC EXERCISES: CPT | Mod: GP

## 2019-03-18 PROCEDURE — 97012 MECHANICAL TRACTION THERAPY: CPT | Mod: GP

## 2019-03-22 ENCOUNTER — HOSPITAL ENCOUNTER (OUTPATIENT)
Dept: PHYSICAL THERAPY | Facility: HOSPITAL | Age: 74
Setting detail: THERAPIES SERIES
End: 2019-03-22
Attending: INTERNAL MEDICINE
Payer: COMMERCIAL

## 2019-03-22 PROCEDURE — 97110 THERAPEUTIC EXERCISES: CPT | Mod: GP

## 2019-03-22 PROCEDURE — 97012 MECHANICAL TRACTION THERAPY: CPT | Mod: GP

## 2019-03-26 ENCOUNTER — HOSPITAL ENCOUNTER (OUTPATIENT)
Dept: PHYSICAL THERAPY | Facility: HOSPITAL | Age: 74
Setting detail: THERAPIES SERIES
End: 2019-03-26
Attending: INTERNAL MEDICINE
Payer: COMMERCIAL

## 2019-03-26 PROCEDURE — 97012 MECHANICAL TRACTION THERAPY: CPT | Mod: GP

## 2019-03-28 ENCOUNTER — HOSPITAL ENCOUNTER (OUTPATIENT)
Dept: PHYSICAL THERAPY | Facility: HOSPITAL | Age: 74
Setting detail: THERAPIES SERIES
End: 2019-03-28
Attending: INTERNAL MEDICINE
Payer: COMMERCIAL

## 2019-03-28 PROCEDURE — 97110 THERAPEUTIC EXERCISES: CPT | Mod: GP

## 2019-04-01 ENCOUNTER — HOSPITAL ENCOUNTER (OUTPATIENT)
Dept: PHYSICAL THERAPY | Facility: HOSPITAL | Age: 74
Setting detail: THERAPIES SERIES
End: 2019-04-01
Attending: INTERNAL MEDICINE
Payer: COMMERCIAL

## 2019-04-01 PROCEDURE — 97110 THERAPEUTIC EXERCISES: CPT | Mod: GP

## 2019-04-01 PROCEDURE — 97012 MECHANICAL TRACTION THERAPY: CPT | Mod: GP

## 2019-04-04 ENCOUNTER — HOSPITAL ENCOUNTER (OUTPATIENT)
Dept: PHYSICAL THERAPY | Facility: HOSPITAL | Age: 74
Setting detail: THERAPIES SERIES
End: 2019-04-04
Attending: INTERNAL MEDICINE
Payer: COMMERCIAL

## 2019-04-04 PROCEDURE — 97012 MECHANICAL TRACTION THERAPY: CPT | Mod: GP

## 2019-04-04 PROCEDURE — 97110 THERAPEUTIC EXERCISES: CPT | Mod: GP

## 2019-04-09 ENCOUNTER — HOSPITAL ENCOUNTER (OUTPATIENT)
Dept: PHYSICAL THERAPY | Facility: HOSPITAL | Age: 74
Setting detail: THERAPIES SERIES
End: 2019-04-09
Attending: INTERNAL MEDICINE
Payer: COMMERCIAL

## 2019-04-09 PROCEDURE — 97110 THERAPEUTIC EXERCISES: CPT | Mod: GP

## 2019-04-11 ENCOUNTER — HOSPITAL ENCOUNTER (OUTPATIENT)
Dept: PHYSICAL THERAPY | Facility: HOSPITAL | Age: 74
Setting detail: THERAPIES SERIES
End: 2019-04-11
Attending: INTERNAL MEDICINE
Payer: COMMERCIAL

## 2019-04-11 PROCEDURE — 97110 THERAPEUTIC EXERCISES: CPT | Mod: GP

## 2019-06-06 ENCOUNTER — OFFICE VISIT (OUTPATIENT)
Dept: CHIROPRACTIC MEDICINE | Facility: OTHER | Age: 74
End: 2019-06-06
Attending: CHIROPRACTOR
Payer: COMMERCIAL

## 2019-06-06 DIAGNOSIS — M54.50 ACUTE BILATERAL LOW BACK PAIN WITHOUT SCIATICA: ICD-10-CM

## 2019-06-06 DIAGNOSIS — M99.02 SEGMENTAL AND SOMATIC DYSFUNCTION OF THORACIC REGION: ICD-10-CM

## 2019-06-06 DIAGNOSIS — M99.03 SEGMENTAL AND SOMATIC DYSFUNCTION OF LUMBAR REGION: Primary | ICD-10-CM

## 2019-06-06 PROCEDURE — 98940 CHIROPRACT MANJ 1-2 REGIONS: CPT | Mod: AT | Performed by: CHIROPRACTOR

## 2019-06-06 NOTE — PROGRESS NOTES
Subjective Finding:    Chief compalint: Patient presents with:  Back Pain  , Pain Scale: 7/10, Intensity: sharp, Duration: 10 days, Radiating:  right buttock.    Date of injury:     Activities that the pain restricts:   Home/household/hobbies/social activities: yes.  Work duties: no.  Sleep: no.  Makes symptoms better: rest.  Makes symptoms worse: activity.  Have you seen anyone else for the symptoms? No.  Work related: no.  Automobile related injury: no.    Objective and Assessment:    Posture Analysis:   High shoulder: .  Head tilt: .  High iliac crest: right.  Head carriage: neutral.  Thoracic Kyphosis: neutral.  Lumbar Lordosis: forward.    Lumbar Range of Motion: extension decreased, left lateral flexion decreased and right lateral flexion decreased.  Cervical Range of Motion: .  Thoracic Range of Motion: .  Extremity Range of Motion: .    Palpation:   Quad lumb: right, referred pain: no    Segmental dysfunction pre-treatment and treatment area: T5, T11, L5 and PSIS Right.    Assessment post-treatment:  Cervical: .  Thoracic: ROM increased.  Lumbar: ROM increased.    Comments: .      Complicating Factors: .    Procedure(s):  CMT:  82572 Chiropractic manipulative treatment 1-2 regions performed   Thoracic: Diversified, See above for level, Prone and Lumbar: Diversified, See above for level, Side posture    Modalities:  None performed this visit    Therapeutic procedures:  None    Plan:  Treatment plan: PRN.  Instructed patient: stretch as instructed at visit.  Short term goals: increase ROM.  Long term goals: increase ADL.  Prognosis: excellent.

## 2019-06-11 ENCOUNTER — OFFICE VISIT (OUTPATIENT)
Dept: CHIROPRACTIC MEDICINE | Facility: OTHER | Age: 74
End: 2019-06-11
Attending: CHIROPRACTOR
Payer: COMMERCIAL

## 2019-06-11 DIAGNOSIS — M99.03 SEGMENTAL AND SOMATIC DYSFUNCTION OF LUMBAR REGION: Primary | ICD-10-CM

## 2019-06-11 DIAGNOSIS — M99.02 SEGMENTAL AND SOMATIC DYSFUNCTION OF THORACIC REGION: ICD-10-CM

## 2019-06-11 DIAGNOSIS — M54.50 ACUTE BILATERAL LOW BACK PAIN WITHOUT SCIATICA: ICD-10-CM

## 2019-06-11 PROCEDURE — 98940 CHIROPRACT MANJ 1-2 REGIONS: CPT | Mod: AT | Performed by: CHIROPRACTOR

## 2019-06-11 NOTE — PROGRESS NOTES
Subjective Finding:    Chief compalint: Patient presents with:  Back Pain  , Pain Scale: 7/10, Intensity: sharp, Duration: 10 days, Radiating:  right buttock.    Date of injury:     Activities that the pain restricts:   Home/household/hobbies/social activities: yes.  Work duties: no.  Sleep: no.  Makes symptoms better: rest.  Makes symptoms worse: activity.  Have you seen anyone else for the symptoms? No.  Work related: no.  Automobile related injury: no.    Objective and Assessment:    Posture Analysis:   High shoulder: .  Head tilt: .  High iliac crest: right.  Head carriage: neutral.  Thoracic Kyphosis: neutral.  Lumbar Lordosis: forward.    Lumbar Range of Motion: extension decreased, left lateral flexion decreased and right lateral flexion decreased.  Cervical Range of Motion: .  Thoracic Range of Motion: .  Extremity Range of Motion: .    Palpation:   Quad lumb: right, referred pain: no    Segmental dysfunction pre-treatment and treatment area: T5, T11, L5 and PSIS Right.    Assessment post-treatment:  Cervical: .  Thoracic: ROM increased.  Lumbar: ROM increased.    Comments: .      Complicating Factors: .    Procedure(s):  CMT:  49897 Chiropractic manipulative treatment 1-2 regions performed   Thoracic: Diversified, See above for level, Prone and Lumbar: Diversified, See above for level, Side posture    Modalities:  None performed this visit    Therapeutic procedures:  None    Plan:  Treatment plan: PRN.  Instructed patient: stretch as instructed at visit.  Short term goals: increase ROM.  Long term goals: increase ADL.  Prognosis: excellent.

## 2019-06-13 ENCOUNTER — OFFICE VISIT (OUTPATIENT)
Dept: CHIROPRACTIC MEDICINE | Facility: OTHER | Age: 74
End: 2019-06-13
Attending: CHIROPRACTOR
Payer: COMMERCIAL

## 2019-06-13 DIAGNOSIS — M54.50 ACUTE BILATERAL LOW BACK PAIN WITHOUT SCIATICA: ICD-10-CM

## 2019-06-13 DIAGNOSIS — M99.03 SEGMENTAL AND SOMATIC DYSFUNCTION OF LUMBAR REGION: Primary | ICD-10-CM

## 2019-06-13 DIAGNOSIS — M99.02 SEGMENTAL AND SOMATIC DYSFUNCTION OF THORACIC REGION: ICD-10-CM

## 2019-06-13 PROCEDURE — 98940 CHIROPRACT MANJ 1-2 REGIONS: CPT | Mod: AT | Performed by: CHIROPRACTOR

## 2019-06-13 NOTE — PROGRESS NOTES
Subjective Finding:    Chief compalint: Patient presents with:  Back Pain  , Pain Scale: 7/10, Intensity: sharp, Duration: 10 days, Radiating:  right buttock.    Date of injury:     Activities that the pain restricts:   Home/household/hobbies/social activities: yes.  Work duties: no.  Sleep: no.  Makes symptoms better: rest.  Makes symptoms worse: activity.  Have you seen anyone else for the symptoms? No.  Work related: no.  Automobile related injury: no.    Objective and Assessment:    Posture Analysis:   High shoulder: .  Head tilt: .  High iliac crest: right.  Head carriage: neutral.  Thoracic Kyphosis: neutral.  Lumbar Lordosis: forward.    Lumbar Range of Motion: extension decreased, left lateral flexion decreased and right lateral flexion decreased.  Cervical Range of Motion: .  Thoracic Range of Motion: .  Extremity Range of Motion: .    Palpation:   Quad lumb: right, referred pain: no    Segmental dysfunction pre-treatment and treatment area: T5, T11, L5 and PSIS Right.    Assessment post-treatment:  Cervical: .  Thoracic: ROM increased.  Lumbar: ROM increased.    Comments: .      Complicating Factors: .    Procedure(s):  CMT:  87694 Chiropractic manipulative treatment 1-2 regions performed   Thoracic: Diversified, See above for level, Prone and Lumbar: Diversified, See above for level, Side posture    Modalities:  None performed this visit    Therapeutic procedures:  None    Plan:  Treatment plan: PRN.  Instructed patient: stretch as instructed at visit.  Short term goals: increase ROM.  Long term goals: increase ADL.  Prognosis: excellent.

## 2019-06-25 ENCOUNTER — OFFICE VISIT (OUTPATIENT)
Dept: CHIROPRACTIC MEDICINE | Facility: OTHER | Age: 74
End: 2019-06-25
Attending: CHIROPRACTOR
Payer: COMMERCIAL

## 2019-06-25 DIAGNOSIS — M99.03 SEGMENTAL AND SOMATIC DYSFUNCTION OF LUMBAR REGION: Primary | ICD-10-CM

## 2019-06-25 DIAGNOSIS — M54.50 ACUTE BILATERAL LOW BACK PAIN WITHOUT SCIATICA: ICD-10-CM

## 2019-06-25 DIAGNOSIS — M99.02 SEGMENTAL AND SOMATIC DYSFUNCTION OF THORACIC REGION: ICD-10-CM

## 2019-06-25 PROCEDURE — 98940 CHIROPRACT MANJ 1-2 REGIONS: CPT | Mod: AT | Performed by: CHIROPRACTOR

## 2019-06-26 NOTE — PROGRESS NOTES
Subjective Finding:    Chief compalint: Patient presents with:  Back Pain  , Pain Scale: 7/10, Intensity: sharp, Duration: 10 days, Radiating:  right buttock.    Date of injury:     Activities that the pain restricts:   Home/household/hobbies/social activities: yes.  Work duties: no.  Sleep: no.  Makes symptoms better: rest.  Makes symptoms worse: activity.  Have you seen anyone else for the symptoms? No.  Work related: no.  Automobile related injury: no.    Objective and Assessment:    Posture Analysis:   High shoulder: .  Head tilt: .  High iliac crest: right.  Head carriage: neutral.  Thoracic Kyphosis: neutral.  Lumbar Lordosis: forward.    Lumbar Range of Motion: extension decreased, left lateral flexion decreased and right lateral flexion decreased.  Cervical Range of Motion: .  Thoracic Range of Motion: .  Extremity Range of Motion: .    Palpation:   Quad lumb: right, referred pain: no    Segmental dysfunction pre-treatment and treatment area: T5, T11, L5 and PSIS Right.    Assessment post-treatment:  Cervical: .  Thoracic: ROM increased.  Lumbar: ROM increased.    Comments: .      Complicating Factors: .    Procedure(s):  CMT:  22980 Chiropractic manipulative treatment 1-2 regions performed   Thoracic: Diversified, See above for level, Prone and Lumbar: Diversified, See above for level, Side posture    Modalities:  None performed this visit    Therapeutic procedures:  None    Plan:  Treatment plan: PRN.  Instructed patient: stretch as instructed at visit.  Short term goals: increase ROM.  Long term goals: increase ADL.  Prognosis: excellent.

## 2019-07-02 ENCOUNTER — OFFICE VISIT (OUTPATIENT)
Dept: CHIROPRACTIC MEDICINE | Facility: OTHER | Age: 74
End: 2019-07-02
Attending: CHIROPRACTOR
Payer: COMMERCIAL

## 2019-07-02 DIAGNOSIS — M54.50 ACUTE BILATERAL LOW BACK PAIN WITHOUT SCIATICA: ICD-10-CM

## 2019-07-02 DIAGNOSIS — M99.02 SEGMENTAL AND SOMATIC DYSFUNCTION OF THORACIC REGION: ICD-10-CM

## 2019-07-02 DIAGNOSIS — M99.03 SEGMENTAL AND SOMATIC DYSFUNCTION OF LUMBAR REGION: Primary | ICD-10-CM

## 2019-07-02 PROCEDURE — 98940 CHIROPRACT MANJ 1-2 REGIONS: CPT | Mod: AT | Performed by: CHIROPRACTOR

## 2019-07-03 NOTE — PROGRESS NOTES
Subjective Finding:    Chief compalint: Patient presents with:  Back Pain  , Pain Scale: 7/10, Intensity: sharp, Duration: 10 days, Radiating:  right buttock.    Date of injury:     Activities that the pain restricts:   Home/household/hobbies/social activities: yes.  Work duties: no.  Sleep: no.  Makes symptoms better: rest.  Makes symptoms worse: activity.  Have you seen anyone else for the symptoms? No.  Work related: no.  Automobile related injury: no.    Objective and Assessment:    Posture Analysis:   High shoulder: .  Head tilt: .  High iliac crest: right.  Head carriage: neutral.  Thoracic Kyphosis: neutral.  Lumbar Lordosis: forward.    Lumbar Range of Motion: extension decreased, left lateral flexion decreased and right lateral flexion decreased.  Cervical Range of Motion: .  Thoracic Range of Motion: .  Extremity Range of Motion: .    Palpation:   Quad lumb: right, referred pain: no    Segmental dysfunction pre-treatment and treatment area: T5, T11, L5 and PSIS Right.    Assessment post-treatment:  Cervical: .  Thoracic: ROM increased.  Lumbar: ROM increased.    Comments: .      Complicating Factors: .    Procedure(s):  CMT:  01704 Chiropractic manipulative treatment 1-2 regions performed   Thoracic: Diversified, See above for level, Prone and Lumbar: Diversified, See above for level, Side posture    Modalities:  None performed this visit    Therapeutic procedures:  None    Plan:  Treatment plan: PRN.  Instructed patient: stretch as instructed at visit.  Short term goals: increase ROM.  Long term goals: increase ADL.  Prognosis: excellent.

## 2019-07-09 ENCOUNTER — OFFICE VISIT (OUTPATIENT)
Dept: CHIROPRACTIC MEDICINE | Facility: OTHER | Age: 74
End: 2019-07-09
Attending: CHIROPRACTOR
Payer: COMMERCIAL

## 2019-07-09 DIAGNOSIS — M99.03 SEGMENTAL AND SOMATIC DYSFUNCTION OF LUMBAR REGION: Primary | ICD-10-CM

## 2019-07-09 DIAGNOSIS — M54.50 ACUTE BILATERAL LOW BACK PAIN WITHOUT SCIATICA: ICD-10-CM

## 2019-07-09 DIAGNOSIS — M99.02 SEGMENTAL AND SOMATIC DYSFUNCTION OF THORACIC REGION: ICD-10-CM

## 2019-07-09 PROCEDURE — 98941 CHIROPRACT MANJ 3-4 REGIONS: CPT | Mod: AT | Performed by: CHIROPRACTOR

## 2019-07-11 NOTE — PROGRESS NOTES
Subjective Finding:    Chief compalint: Patient presents with:  Back Pain  , Pain Scale: 7/10, Intensity: sharp, Duration: 10 days, Radiating:  right buttock.    Date of injury:     Activities that the pain restricts:   Home/household/hobbies/social activities: yes.  Work duties: no.  Sleep: no.  Makes symptoms better: rest.  Makes symptoms worse: activity.  Have you seen anyone else for the symptoms? No.  Work related: no.  Automobile related injury: no.    Objective and Assessment:    Posture Analysis:   High shoulder: .  Head tilt: .  High iliac crest: right.  Head carriage: neutral.  Thoracic Kyphosis: neutral.  Lumbar Lordosis: forward.    Lumbar Range of Motion: extension decreased, left lateral flexion decreased and right lateral flexion decreased.  Cervical Range of Motion: .  Thoracic Range of Motion: .  Extremity Range of Motion: .    Palpation:   Quad lumb: right, referred pain: no    Segmental dysfunction pre-treatment and treatment area: T5, T11, L5 and PSIS Right.    Assessment post-treatment:  Cervical: .  Thoracic: ROM increased.  Lumbar: ROM increased.    Comments: .      Complicating Factors: .    Procedure(s):  CMT:  00431 Chiropractic manipulative treatment 1-2 regions performed   Thoracic: Diversified, See above for level, Prone and Lumbar: Diversified, See above for level, Side posture    Modalities:  None performed this visit    Therapeutic procedures:  None    Plan:  Treatment plan: PRN.  Instructed patient: stretch as instructed at visit.  Short term goals: increase ROM.  Long term goals: increase ADL.  Prognosis: excellent.             
68287 Exp Problem Focused - Mod. Complex

## 2019-10-19 NOTE — ED AVS SNAPSHOT
[facility]  [address]  [place] [address]  Phone:  [phone]                                    Lamont Doran   MRN: [id number]    Department:  [facility]   Date of Visit:  10/19/2019           After Visit Summary Signature Page    I have received my discharge instructions, and my questions have been answered. I have discussed any challenges I see with this plan with the nurse or doctor.    ..........................................................................................................................................  Patient/Patient Representative Signature      ..........................................................................................................................................  Patient Representative Print Name and Relationship to Patient    ..................................................               ................................................  Date                                   Time    ..........................................................................................................................................  Reviewed by Signature/Title    ...................................................              ..............................................  Date                                               Time          22EPIC Rev 08/18

## 2019-10-19 NOTE — ED NOTES
All discharge instructions and avs discussed with patient and his family.  Pt able to verbalize home care, follow up and medication management.  Vitals stable.  All belongings sent home

## 2019-10-19 NOTE — ED TRIAGE NOTES
"Patient presents via ambulance.  Patient was working outside when he tripped on a saw horse and fell.  Patient with multiple abrasions/lacerations to face/head.  Per bystanders report patient is \"slurring\" his words more than normal.  Trauma Evaluation called and Dr. Sousa at bedside for initial evaluation.  Patient had a brief period of unconsciousness.  On arrival ED staff applied C-Collar CMS remained intact x 4.  On Plavix; MD aware no upgrade in trauma code at this time.  "

## 2019-10-19 NOTE — ED PROVIDER NOTES
History     Chief Complaint   Patient presents with     Trauma     Fall     HPI  Lamont Doran is a 74 year old male who was brought in by EMS after sustaining a trip and fall while in the garage at home.  He struck his forehead.  Unsure of loss of consciousness.  He is on Plavix.  Tetanus is current.  He appeared to be confused when this initially happened.  There were other people at home.  When arrival to the emergency room occurred cervical collar was placed on his neck.  In the emergency room he is alert and oriented to time place and person.  Initially level 2 trauma was called but this is backed off now.  Vital signs were taken and he sent over to CT scan.  EKG revealed a sinus rhythm with a rate of 85 and a PAC.  HTN.  He had CABG about 4 years ago.  He takes medicine for cholesterol as well.    Allergies:  Allergies   Allergen Reactions     No Clinical Screening - See Comments      Peas and liver       Problem List:    There are no active problems to display for this patient.       Past Medical History:    No past medical history on file.    Past Surgical History:    No past surgical history on file.    Family History:    No family history on file.    Social History:  Marital Status:   [2]  Social History     Tobacco Use     Smoking status: Not on file   Substance Use Topics     Alcohol use: Not on file     Drug use: Not on file        Medications:    ASPIRIN PO  Atorvastatin Calcium (LIPITOR PO)  Clopidogrel Bisulfate (PLAVIX PO)  LISINOPRIL PO  METOPROLOL TARTRATE PO  OMEPRAZOLE PO          Review of Systems   Constitutional: Negative.    HENT: Negative.    Eyes: Negative.    Respiratory: Negative.    Cardiovascular: Negative.    Gastrointestinal: Negative.    Endocrine: Negative.    Genitourinary: Negative.    Musculoskeletal: Positive for back pain (Chronic low back pain for which she has had injections before.  He has had cervical fusion.).   Skin: Positive for wound (Skin avulsion and abrasion  on the forehead).   Allergic/Immunologic: Negative.    Neurological: Positive for headaches.   Hematological: Negative.    Psychiatric/Behavioral: Negative.    All other systems reviewed and are negative.      Physical Exam   BP: (!) 199/98  Heart Rate: 85  Temp: 97.5  F (36.4  C)  Resp: 16(error in previous validated data; RR 16)  SpO2: 93 %      Physical Exam  Vitals signs reviewed.   Constitutional:       General: He is not in acute distress.     Appearance: Normal appearance. He is obese. He is not ill-appearing, toxic-appearing or diaphoretic.   HENT:      Head: Normocephalic.      Comments: Patient has about a 3 cm skin avulsion on the forehead with surrounding abrasion and mild soft tissue edema     Right Ear: Tympanic membrane, ear canal and external ear normal.      Left Ear: Tympanic membrane, ear canal and external ear normal.      Nose: No congestion or rhinorrhea.      Comments: RHINOPHYMA     Mouth/Throat:      Mouth: Mucous membranes are moist.      Pharynx: Oropharynx is clear. No oropharyngeal exudate or posterior oropharyngeal erythema.   Eyes:      General:         Right eye: No discharge.         Left eye: No discharge.      Extraocular Movements: Extraocular movements intact.      Pupils: Pupils are equal, round, and reactive to light.   Neck:      Musculoskeletal: Normal range of motion. No neck rigidity or muscular tenderness.      Vascular: No carotid bruit.   Cardiovascular:      Rate and Rhythm: Normal rate.      Pulses: Normal pulses.      Heart sounds: Normal heart sounds. No murmur.   Pulmonary:      Effort: Pulmonary effort is normal. No respiratory distress.      Breath sounds: No wheezing or rales.   Abdominal:      Tenderness: There is no tenderness.   Musculoskeletal:      Right lower leg: Edema (Trace of the right ankle) present.      Left lower leg: Edema (Trace of left ankle) present.   Skin:     General: Skin is warm and dry.      Capillary Refill: Capillary refill takes less  than 2 seconds.      Coloration: Skin is not jaundiced or pale.      Findings: No bruising, erythema, lesion or rash.      Comments: Patient has skin avulsion with abrasion present on the forehead.  Scar on anterior chest from CABG   Neurological:      General: No focal deficit present.      Mental Status: He is alert and oriented to person, place, and time.      Cranial Nerves: Cranial nerves are intact. No cranial nerve deficit.      Sensory: No sensory deficit.      Motor: No weakness.      Comments: [place] Coma Scale is 15   Psychiatric:         Mood and Affect: Mood normal.         Behavior: Behavior normal.         Thought Content: Thought content normal.         Judgement: Judgment normal.         ED Course   1220: I removed the cervical collar.  There is no tenderness of the cervical spine or paravertebral muscles.  He has some mild discomfort of the lumbar paravertebral musculature but this is chronic.  He is alert and oriented to time place and person.  No kelly sign noted with ear exam.  GCS is 15.     Procedures  1312  STILL WAITING FOR CT REPORT.  PATIENT A&O X 3.  AMBULATED.               Critical Care time:  none               Results for orders placed or performed during the hospital encounter of 10/19/19 (from the past 24 hour(s))   Glucose by meter   Result Value Ref Range    Glucose 88 70 - 99 mg/dL   Alcohol ethyl   Result Value Ref Range    Ethanol g/dL <0.01 0.01 g/dL   Basic metabolic panel   Result Value Ref Range    Sodium 142 133 - 144 mmol/L    Potassium 3.5 3.4 - 5.3 mmol/L    Chloride 109 94 - 109 mmol/L    Carbon Dioxide 28 20 - 32 mmol/L    Anion Gap 5 3 - 14 mmol/L    Glucose 96 70 - 99 mg/dL    Urea Nitrogen 18 7 - 30 mg/dL    Creatinine 0.86 0.66 - 1.25 mg/dL    GFR Estimate 84 >60 mL/min/[1.73_m2]    GFR Estimate If Black >90 >60 mL/min/[1.73_m2]    Calcium 8.0 (L) 8.5 - 10.1 mg/dL   CBC with platelets differential   Result Value Ref Range    WBC 6.7 4.0 - 11.0 10e9/L    RBC  Count 4.90 4.4 - 5.9 10e12/L    Hemoglobin 13.6 13.3 - 17.7 g/dL    Hematocrit 42.5 40.0 - 53.0 %    MCV 87 78 - 100 fl    MCH 27.8 26.5 - 33.0 pg    MCHC 32.0 31.5 - 36.5 g/dL    RDW 15.1 (H) 10.0 - 15.0 %    Platelet Count 145 (L) 150 - 450 10e9/L    Diff Method Automated Method     % Neutrophils 72.5 %    % Lymphocytes 16.2 %    % Monocytes 9.1 %    % Eosinophils 1.5 %    % Basophils 0.4 %    % Immature Granulocytes 0.3 %    Nucleated RBCs 0 0 /100    Absolute Neutrophil 4.8 1.6 - 8.3 10e9/L    Absolute Lymphocytes 1.1 0.8 - 5.3 10e9/L    Absolute Monocytes 0.6 0.0 - 1.3 10e9/L    Absolute Eosinophils 0.1 0.0 - 0.7 10e9/L    Absolute Basophils 0.0 0.0 - 0.2 10e9/L    Abs Immature Granulocytes 0.0 0 - 0.4 10e9/L    Absolute Nucleated RBC 0.0    Partial thromboplastin time   Result Value Ref Range    PTT 25 24.00 - 37.00 sec       Medications - No data to display    Assessments & Plan (with Medical Decision Making)     I have reviewed the nursing notes.    I have reviewed the findings, diagnosis, plan and need for follow up with the patient.   1330: Patient is stable.  Reviewed the imaging results.  Still alert and oriented to time place and person.  Plan is to discharge him home.  Scalp wounds were cleansed and dressed.  Discharge care of family.  Will provide information on wounds and head injury when he follow-up with his primary care provider in 2 days.  Return if any issues.    New Prescriptions    No medications on file       Final diagnoses:   Fall, initial encounter   Closed head injury, initial encounter   Open wound of scalp, unspecified open wound type, initial encounter       10/19/2019   [facility]     Anoop Sousa DO  10/19/19 1330

## 2019-10-21 NOTE — ED PROVIDER NOTES
History     Chief Complaint   Patient presents with     Bleeding/Bruising     HPI  Lamont Doran is a 74 year old male who was seen here in the emergency room on 1019 status post fall.  Patient fell forward striking his face on the ground.  He was evaluated and discharged home and instructed to return to the emergency room for any new symptoms.  Patient reports today he noticed that he had bruising around and below his eyes.  Family reports initially he had significant swelling across the bridge of his nose but not that bruising that is present currently around his eyes.  He denies any other symptoms headache nausea vomiting neck pain.    Allergies:  Allergies   Allergen Reactions     No Clinical Screening - See Comments      Peas and liver       Problem List:    There are no active problems to display for this patient.       Past Medical History:    No past medical history on file.    Past Surgical History:    No past surgical history on file.    Family History:    No family history on file.    Social History:  Marital Status:   [2]  Social History     Tobacco Use     Smoking status: Not on file   Substance Use Topics     Alcohol use: Not on file     Drug use: Not on file        Medications:    ASPIRIN PO  Atorvastatin Calcium (LIPITOR PO)  Clopidogrel Bisulfate (PLAVIX PO)  furosemide (LASIX) 20 MG tablet  METOPROLOL TARTRATE PO  OMEPRAZOLE PO          Review of Systems   Constitutional: Negative.    HENT: Positive for facial swelling.    Eyes: Negative.    Respiratory: Negative.    Cardiovascular: Negative.    Gastrointestinal: Negative.    Genitourinary: Negative.    Musculoskeletal: Negative.    Skin: Negative.    Neurological: Negative.    Psychiatric/Behavioral: Negative.        Physical Exam   BP: 122/55  Heart Rate: 92  Temp: 98.1  F (36.7  C)  Resp: 16  SpO2: 92 %      Physical Exam  Vitals signs and nursing note reviewed. Exam conducted with a chaperone present.   Constitutional:       General: He  is not in acute distress.     Appearance: He is obese. He is not diaphoretic.   HENT:      Head: Atraumatic.      Right Ear: Tympanic membrane normal.      Left Ear: Tympanic membrane normal.      Mouth/Throat:      Mouth: Mucous membranes are moist.      Pharynx: Oropharynx is clear.   Eyes:      General: No scleral icterus.     Pupils: Pupils are equal, round, and reactive to light.   Neck:      Musculoskeletal: Normal range of motion and neck supple. No muscular tenderness.   Cardiovascular:      Rate and Rhythm: Normal rate.      Pulses: Normal pulses.      Heart sounds: Normal heart sounds.   Pulmonary:      Effort: No respiratory distress.      Breath sounds: Normal breath sounds.   Abdominal:      General: Bowel sounds are normal.      Palpations: Abdomen is soft.      Tenderness: There is no tenderness.   Musculoskeletal: Normal range of motion.         General: No tenderness.   Skin:     General: Skin is warm and dry.      Capillary Refill: Capillary refill takes less than 2 seconds.      Findings: Ecchymosis present. No rash.      Comments: Pt has abrasion to forehead which was treated with surgical adhesive on 19th. He has bilateral periorbital edema that is mild, moderate bilateral periorbital ecchymosis that does not appear to be acute.    Neurological:      General: No focal deficit present.      Mental Status: He is alert and oriented to person, place, and time.   Psychiatric:         Mood and Affect: Mood normal.         ED Course        Procedures           Results for orders placed or performed during the hospital encounter of 10/21/19 (from the past 24 hour(s))   CT Facial Bones without Contrast    Narrative    CT FACIAL BONES WITHOUT CONTRAST, 10/21/2019 4:09 PM    History: Male, age 74 years; Facial trauma, fx suspected    Comparison: Head CT 10/19/2019    Technique: CT scan was performed of the facial bones. Sagittal,  coronal and axial reconstructions were reviewed.     FINDINGS: Bones of the  face are intact. Mandible is intact.  Temporomandibular joints are congruent. Mucous retention cyst is seen  posteriorly within the left maxillary sinus. Postoperative changes are  seen in the mid cervical spine with severe endplate degenerative  changes at C2-3 and C3-4.    Soft tissue swelling is seen in the periorbital regions and forehead .      Impression    IMPRESSION:   1.  No evidence of an acute or healing fracture.    2.  Soft tissue swelling in the periorbital regions and forehead.    COLBY REYES MD       Medications - No data to display    Assessments & Plan (with Medical Decision Making)   I informed pt of facial CT results, negative for fx. Advised movement of the bruising is common after facial trauma. Advised may take several weeks for all of the bruising to resolve. He may resume his normal activities and follow up with pcp as needed.   I have reviewed the nursing notes.    I have reviewed the findings, diagnosis, plan and need for follow up with the patient.      Discharge Medication List as of 10/21/2019  4:37 PM          Final diagnoses:   Contusion of face, scalp and neck, initial encounter       10/21/2019   [facility]     Cathryn Lea NP  10/21/19 2028

## 2019-10-21 NOTE — ED TRIAGE NOTES
Pt presents today with c/o increase of swelling around the eyes, new this morning. States he fell on Saturday and was seen in the ER, was told to come in if he got worse. Denies nausea and vomiting. Had a slight left sided headache this morning, gone now. PT is on Plavix.

## 2019-10-21 NOTE — ED AVS SNAPSHOT
[facility]  [address]  [place] [address]  Phone:  [phone]                                    Lamont Doran   MRN: [id number]    Department:  [facility]   Date of Visit:  10/21/2019           After Visit Summary Signature Page    I have received my discharge instructions, and my questions have been answered. I have discussed any challenges I see with this plan with the nurse or doctor.    ..........................................................................................................................................  Patient/Patient Representative Signature      ..........................................................................................................................................  Patient Representative Print Name and Relationship to Patient    ..................................................               ................................................  Date                                   Time    ..........................................................................................................................................  Reviewed by Signature/Title    ...................................................              ..............................................  Date                                               Time          22EPIC Rev 08/18

## 2019-10-21 NOTE — ED TRIAGE NOTES
"Pt to ED with spouse. Pt was seen on Saturday for a fall and \"smashed my face on the concrete\"  Pt was evaluated and told to come back if having any changes  Pt woke with morning with new bruising on face and under eyes.  No neurological changes. Pt alert and ornt.   "

## 2020-01-01 ENCOUNTER — TELEPHONE (OUTPATIENT)
Dept: FAMILY MEDICINE | Facility: OTHER | Age: 75
End: 2020-01-01

## 2020-01-01 ENCOUNTER — HOSPITAL ENCOUNTER (OUTPATIENT)
Dept: PHYSICAL THERAPY | Facility: HOSPITAL | Age: 75
Setting detail: THERAPIES SERIES
End: 2020-02-21
Attending: INTERNAL MEDICINE
Payer: COMMERCIAL

## 2020-01-01 ENCOUNTER — APPOINTMENT (OUTPATIENT)
Dept: CT IMAGING | Facility: HOSPITAL | Age: 75
End: 2020-01-01
Attending: EMERGENCY MEDICINE
Payer: COMMERCIAL

## 2020-01-01 ENCOUNTER — APPOINTMENT (OUTPATIENT)
Dept: GENERAL RADIOLOGY | Facility: HOSPITAL | Age: 75
End: 2020-01-01
Attending: EMERGENCY MEDICINE
Payer: COMMERCIAL

## 2020-01-01 ENCOUNTER — MEDICAL CORRESPONDENCE (OUTPATIENT)
Dept: HEALTH INFORMATION MANAGEMENT | Facility: CLINIC | Age: 75
End: 2020-01-01

## 2020-01-01 ENCOUNTER — APPOINTMENT (OUTPATIENT)
Dept: CT IMAGING | Facility: HOSPITAL | Age: 75
DRG: 643 | End: 2020-01-01
Attending: EMERGENCY MEDICINE
Payer: COMMERCIAL

## 2020-01-01 ENCOUNTER — TRANSFERRED RECORDS (OUTPATIENT)
Dept: HEALTH INFORMATION MANAGEMENT | Facility: CLINIC | Age: 75
End: 2020-01-01

## 2020-01-01 ENCOUNTER — ANCILLARY PROCEDURE (OUTPATIENT)
Dept: GENERAL RADIOLOGY | Facility: OTHER | Age: 75
End: 2020-01-01
Attending: INTERNAL MEDICINE
Payer: COMMERCIAL

## 2020-01-01 ENCOUNTER — OFFICE VISIT (OUTPATIENT)
Dept: CHIROPRACTIC MEDICINE | Facility: OTHER | Age: 75
End: 2020-01-01
Attending: CHIROPRACTOR
Payer: COMMERCIAL

## 2020-01-01 ENCOUNTER — OFFICE VISIT (OUTPATIENT)
Dept: SLEEP MEDICINE | Facility: HOSPITAL | Age: 75
End: 2020-01-01
Attending: INTERNAL MEDICINE
Payer: COMMERCIAL

## 2020-01-01 ENCOUNTER — APPOINTMENT (OUTPATIENT)
Dept: GENERAL RADIOLOGY | Facility: HOSPITAL | Age: 75
DRG: 643 | End: 2020-01-01
Attending: INTERNAL MEDICINE
Payer: COMMERCIAL

## 2020-01-01 ENCOUNTER — TELEPHONE (OUTPATIENT)
Dept: INTERNAL MEDICINE | Facility: OTHER | Age: 75
End: 2020-01-01

## 2020-01-01 ENCOUNTER — APPOINTMENT (OUTPATIENT)
Dept: PHYSICAL THERAPY | Facility: HOSPITAL | Age: 75
DRG: 643 | End: 2020-01-01
Payer: COMMERCIAL

## 2020-01-01 ENCOUNTER — APPOINTMENT (OUTPATIENT)
Dept: MRI IMAGING | Facility: HOSPITAL | Age: 75
End: 2020-01-01
Attending: EMERGENCY MEDICINE
Payer: COMMERCIAL

## 2020-01-01 ENCOUNTER — APPOINTMENT (OUTPATIENT)
Dept: ULTRASOUND IMAGING | Facility: HOSPITAL | Age: 75
DRG: 643 | End: 2020-01-01
Attending: INTERNAL MEDICINE
Payer: COMMERCIAL

## 2020-01-01 ENCOUNTER — TELEPHONE (OUTPATIENT)
Dept: SLEEP MEDICINE | Facility: HOSPITAL | Age: 75
End: 2020-01-01

## 2020-01-01 ENCOUNTER — DOCUMENTATION ONLY (OUTPATIENT)
Dept: OTHER | Facility: CLINIC | Age: 75
End: 2020-01-01

## 2020-01-01 ENCOUNTER — HOSPITAL ENCOUNTER (EMERGENCY)
Facility: HOSPITAL | Age: 75
Discharge: NURSING, REHAB OR LONG-TERM CARE | End: 2020-04-03
Attending: EMERGENCY MEDICINE | Admitting: EMERGENCY MEDICINE
Payer: COMMERCIAL

## 2020-01-01 ENCOUNTER — APPOINTMENT (OUTPATIENT)
Dept: OCCUPATIONAL THERAPY | Facility: HOSPITAL | Age: 75
DRG: 643 | End: 2020-01-01
Payer: COMMERCIAL

## 2020-01-01 ENCOUNTER — HOSPITAL ENCOUNTER (EMERGENCY)
Facility: HOSPITAL | Age: 75
Discharge: HOME | End: 2020-03-13
Attending: EMERGENCY MEDICINE | Admitting: EMERGENCY MEDICINE
Payer: COMMERCIAL

## 2020-01-01 ENCOUNTER — OFFICE VISIT (OUTPATIENT)
Dept: FAMILY MEDICINE | Facility: OTHER | Age: 75
End: 2020-01-01
Attending: FAMILY MEDICINE
Payer: COMMERCIAL

## 2020-01-01 ENCOUNTER — HOSPITAL ENCOUNTER (OUTPATIENT)
Dept: PHYSICAL THERAPY | Facility: HOSPITAL | Age: 75
Setting detail: THERAPIES SERIES
End: 2020-02-24
Attending: INTERNAL MEDICINE
Payer: COMMERCIAL

## 2020-01-01 ENCOUNTER — TELEPHONE (OUTPATIENT)
Dept: ONCOLOGY | Facility: OTHER | Age: 75
End: 2020-01-01

## 2020-01-01 ENCOUNTER — OFFICE VISIT (OUTPATIENT)
Dept: UROLOGY | Facility: OTHER | Age: 75
End: 2020-01-01
Attending: UROLOGY
Payer: COMMERCIAL

## 2020-01-01 ENCOUNTER — HOSPITAL ENCOUNTER (OUTPATIENT)
Dept: PHYSICAL THERAPY | Facility: HOSPITAL | Age: 75
Setting detail: THERAPIES SERIES
End: 2020-03-11
Attending: INTERNAL MEDICINE
Payer: COMMERCIAL

## 2020-01-01 ENCOUNTER — HOSPITAL ENCOUNTER (EMERGENCY)
Facility: HOSPITAL | Age: 75
Discharge: NURSING, REHAB OR LONG-TERM CARE | End: 2020-05-29
Attending: EMERGENCY MEDICINE | Admitting: EMERGENCY MEDICINE
Payer: COMMERCIAL

## 2020-01-01 ENCOUNTER — APPOINTMENT (OUTPATIENT)
Dept: OCCUPATIONAL THERAPY | Facility: HOSPITAL | Age: 75
DRG: 643 | End: 2020-01-01
Attending: INTERNAL MEDICINE
Payer: COMMERCIAL

## 2020-01-01 ENCOUNTER — HOSPITAL ENCOUNTER (EMERGENCY)
Facility: HOSPITAL | Age: 75
Discharge: HOME | End: 2020-03-14
Admitting: EMERGENCY MEDICINE
Payer: COMMERCIAL

## 2020-01-01 ENCOUNTER — HOSPITAL ENCOUNTER (INPATIENT)
Facility: HOSPITAL | Age: 75
LOS: 11 days | Discharge: NURSING, REHAB OR LONG-TERM CARE | DRG: 643 | End: 2020-05-28
Attending: EMERGENCY MEDICINE | Admitting: INTERNAL MEDICINE
Payer: COMMERCIAL

## 2020-01-01 ENCOUNTER — HOSPITAL ENCOUNTER (EMERGENCY)
Facility: HOSPITAL | Age: 75
Discharge: ANOTHER HOSPITAL | End: 2020-05-29
Attending: EMERGENCY MEDICINE | Admitting: EMERGENCY MEDICINE
Payer: COMMERCIAL

## 2020-01-01 VITALS
DIASTOLIC BLOOD PRESSURE: 60 MMHG | RESPIRATION RATE: 18 BRPM | OXYGEN SATURATION: 96 % | HEART RATE: 57 BPM | SYSTOLIC BLOOD PRESSURE: 132 MMHG | TEMPERATURE: 96.2 F

## 2020-01-01 VITALS
HEART RATE: 86 BPM | WEIGHT: 198.63 LBS | RESPIRATION RATE: 20 BRPM | TEMPERATURE: 96 F | BODY MASS INDEX: 26.9 KG/M2 | DIASTOLIC BLOOD PRESSURE: 54 MMHG | OXYGEN SATURATION: 92 % | SYSTOLIC BLOOD PRESSURE: 110 MMHG | HEIGHT: 72 IN

## 2020-01-01 VITALS
WEIGHT: 198 LBS | HEIGHT: 72 IN | SYSTOLIC BLOOD PRESSURE: 144 MMHG | BODY MASS INDEX: 26.82 KG/M2 | DIASTOLIC BLOOD PRESSURE: 86 MMHG | OXYGEN SATURATION: 93 % | HEART RATE: 89 BPM

## 2020-01-01 VITALS
HEART RATE: 68 BPM | DIASTOLIC BLOOD PRESSURE: 98 MMHG | SYSTOLIC BLOOD PRESSURE: 134 MMHG | OXYGEN SATURATION: 95 % | TEMPERATURE: 98.2 F | RESPIRATION RATE: 16 BRPM

## 2020-01-01 VITALS
RESPIRATION RATE: 16 BRPM | DIASTOLIC BLOOD PRESSURE: 76 MMHG | OXYGEN SATURATION: 96 % | SYSTOLIC BLOOD PRESSURE: 140 MMHG | HEART RATE: 68 BPM | TEMPERATURE: 98.1 F

## 2020-01-01 VITALS
HEART RATE: 75 BPM | SYSTOLIC BLOOD PRESSURE: 114 MMHG | RESPIRATION RATE: 18 BRPM | WEIGHT: 226.85 LBS | OXYGEN SATURATION: 91 % | BODY MASS INDEX: 31.64 KG/M2 | DIASTOLIC BLOOD PRESSURE: 63 MMHG | TEMPERATURE: 96.4 F

## 2020-01-01 VITALS
BODY MASS INDEX: 31.1 KG/M2 | RESPIRATION RATE: 20 BRPM | TEMPERATURE: 98.2 F | HEART RATE: 52 BPM | DIASTOLIC BLOOD PRESSURE: 68 MMHG | WEIGHT: 223 LBS | OXYGEN SATURATION: 93 % | SYSTOLIC BLOOD PRESSURE: 133 MMHG

## 2020-01-01 VITALS
RESPIRATION RATE: 18 BRPM | SYSTOLIC BLOOD PRESSURE: 137 MMHG | TEMPERATURE: 97.8 F | DIASTOLIC BLOOD PRESSURE: 88 MMHG | OXYGEN SATURATION: 94 % | HEART RATE: 84 BPM

## 2020-01-01 DIAGNOSIS — I10 ESSENTIAL HYPERTENSION: ICD-10-CM

## 2020-01-01 DIAGNOSIS — M99.02 SEGMENTAL AND SOMATIC DYSFUNCTION OF THORACIC REGION: ICD-10-CM

## 2020-01-01 DIAGNOSIS — M99.03 SEGMENTAL AND SOMATIC DYSFUNCTION OF LUMBAR REGION: Primary | ICD-10-CM

## 2020-01-01 DIAGNOSIS — R55 SYNCOPE, UNSPECIFIED SYNCOPE TYPE: ICD-10-CM

## 2020-01-01 DIAGNOSIS — R09.02 HYPOXIA: Primary | ICD-10-CM

## 2020-01-01 DIAGNOSIS — R19.7 DIARRHEA, UNSPECIFIED TYPE: ICD-10-CM

## 2020-01-01 DIAGNOSIS — R55 SYNCOPE, UNSPECIFIED SYNCOPE TYPE: Primary | ICD-10-CM

## 2020-01-01 DIAGNOSIS — H43.813 PVD (POSTERIOR VITREOUS DETACHMENT), BILATERAL: ICD-10-CM

## 2020-01-01 DIAGNOSIS — J43.1 PANLOBULAR EMPHYSEMA (H): ICD-10-CM

## 2020-01-01 DIAGNOSIS — M54.50 ACUTE BILATERAL LOW BACK PAIN WITHOUT SCIATICA: ICD-10-CM

## 2020-01-01 DIAGNOSIS — J44.1 OBSTRUCTIVE CHRONIC BRONCHITIS WITH EXACERBATION (H): ICD-10-CM

## 2020-01-01 DIAGNOSIS — I73.9 PERIPHERAL VASCULAR DISEASE (H): ICD-10-CM

## 2020-01-01 DIAGNOSIS — Z78.9 NURSING HOME RESIDENT: Primary | ICD-10-CM

## 2020-01-01 DIAGNOSIS — K21.9 GASTROESOPHAGEAL REFLUX DISEASE WITHOUT ESOPHAGITIS: Primary | ICD-10-CM

## 2020-01-01 DIAGNOSIS — I21.4 NSTEMI (NON-ST ELEVATED MYOCARDIAL INFARCTION) (H): ICD-10-CM

## 2020-01-01 DIAGNOSIS — R80.9 PROTEINURIA, UNSPECIFIED TYPE: ICD-10-CM

## 2020-01-01 DIAGNOSIS — M62.81 GENERALIZED MUSCLE WEAKNESS: ICD-10-CM

## 2020-01-01 DIAGNOSIS — I95.1 ORTHOSTATIC HYPOTENSION: ICD-10-CM

## 2020-01-01 DIAGNOSIS — Z01.818 PREOP GENERAL PHYSICAL EXAM: ICD-10-CM

## 2020-01-01 DIAGNOSIS — Z01.818 PREOP GENERAL PHYSICAL EXAM: Primary | ICD-10-CM

## 2020-01-01 DIAGNOSIS — I25.2 HISTORY OF MI (MYOCARDIAL INFARCTION): ICD-10-CM

## 2020-01-01 DIAGNOSIS — R55 ATYPICAL SYNCOPE: Primary | ICD-10-CM

## 2020-01-01 DIAGNOSIS — J44.9 CHRONIC OBSTRUCTIVE PULMONARY DISEASE, UNSPECIFIED COPD TYPE (H): ICD-10-CM

## 2020-01-01 DIAGNOSIS — R33.9 URINARY RETENTION: Primary | ICD-10-CM

## 2020-01-01 DIAGNOSIS — R55 RECURRENT SYNCOPE: Primary | ICD-10-CM

## 2020-01-01 DIAGNOSIS — G45.9 TIA (TRANSIENT ISCHEMIC ATTACK): Primary | ICD-10-CM

## 2020-01-01 LAB
ACTH PLAS-MCNC: 14 PG/ML
ALBUMIN SERPL-MCNC: 1.2 G/DL (ref 3.4–5)
ALBUMIN SERPL-MCNC: 1.4 G/DL (ref 3.4–5)
ALBUMIN SERPL-MCNC: 1.5 G/DL (ref 3.4–5)
ALBUMIN SERPL-MCNC: 1.7 G/DL (ref 3.4–5)
ALBUMIN SERPL-MCNC: 1.7 G/DL (ref 3.4–5)
ALBUMIN SERPL-MCNC: 1.9 G/DL (ref 3.4–5)
ALBUMIN SERPL-MCNC: 2.4 G/DL (ref 3.4–5)
ALBUMIN UR-MCNC: 100 MG/DL
ALBUMIN UR-MCNC: 300 MG/DL
ALP SERPL-CCNC: 58 U/L (ref 40–150)
ALP SERPL-CCNC: 58 U/L (ref 40–150)
ALP SERPL-CCNC: 64 U/L (ref 40–150)
ALP SERPL-CCNC: 66 U/L (ref 40–150)
ALP SERPL-CCNC: 72 U/L (ref 40–150)
ALP SERPL-CCNC: 72 U/L (ref 40–150)
ALP SERPL-CCNC: 73 U/L (ref 40–150)
ALP SERPL-CCNC: 74 U/L (ref 40–150)
ALP SERPL-CCNC: 80 U/L (ref 40–150)
ALT SERPL W P-5'-P-CCNC: 11 U/L (ref 0–70)
ALT SERPL W P-5'-P-CCNC: 12 U/L (ref 0–70)
ALT SERPL W P-5'-P-CCNC: 13 U/L (ref 0–70)
ALT SERPL W P-5'-P-CCNC: 14 U/L (ref 0–70)
ALT SERPL W P-5'-P-CCNC: 14 U/L (ref 0–70)
ANA SER QL IF: NEGATIVE
ANION GAP SERPL CALCULATED.3IONS-SCNC: 2 MMOL/L (ref 3–14)
ANION GAP SERPL CALCULATED.3IONS-SCNC: 3 MMOL/L (ref 3–14)
ANION GAP SERPL CALCULATED.3IONS-SCNC: 4 MMOL/L (ref 3–14)
ANION GAP SERPL CALCULATED.3IONS-SCNC: 5 MMOL/L (ref 3–14)
ANION GAP SERPL CALCULATED.3IONS-SCNC: 6 MMOL/L (ref 3–14)
ANION GAP SERPL CALCULATED.3IONS-SCNC: 8 MMOL/L (ref 3–14)
APPEARANCE UR: CLEAR
APPEARANCE UR: CLEAR
APTT PPP: 29 SEC (ref 22–37)
AST SERPL W P-5'-P-CCNC: 10 U/L (ref 0–45)
AST SERPL W P-5'-P-CCNC: 12 U/L (ref 0–45)
AST SERPL W P-5'-P-CCNC: 12 U/L (ref 0–45)
AST SERPL W P-5'-P-CCNC: 15 U/L (ref 0–45)
AST SERPL W P-5'-P-CCNC: 15 U/L (ref 0–45)
AST SERPL W P-5'-P-CCNC: 16 U/L (ref 0–45)
AST SERPL W P-5'-P-CCNC: 17 U/L (ref 0–45)
BACTERIA #/AREA URNS HPF: ABNORMAL /HPF
BACTERIA #/AREA URNS HPF: ABNORMAL /HPF
BACTERIA SPEC CULT: ABNORMAL
BACTERIA SPEC CULT: ABNORMAL
BACTERIA SPEC CULT: NORMAL
BACTERIA SPEC CULT: NORMAL
BASE EXCESS BLDV CALC-SCNC: 3.2 MMOL/L
BASOPHILS # BLD AUTO: 0 10E9/L (ref 0–0.2)
BASOPHILS # BLD AUTO: 0.1 10E9/L (ref 0–0.2)
BASOPHILS NFR BLD AUTO: 0.1 %
BASOPHILS NFR BLD AUTO: 0.2 %
BASOPHILS NFR BLD AUTO: 0.2 %
BASOPHILS NFR BLD AUTO: 0.3 %
BASOPHILS NFR BLD AUTO: 0.3 %
BASOPHILS NFR BLD AUTO: 0.4 %
BASOPHILS NFR BLD AUTO: 0.5 %
BASOPHILS NFR BLD AUTO: 0.6 %
BASOPHILS NFR BLD AUTO: 0.6 %
BASOPHILS NFR BLD AUTO: 1 %
BILIRUB SERPL-MCNC: 0.2 MG/DL (ref 0.2–1.3)
BILIRUB SERPL-MCNC: 0.2 MG/DL (ref 0.2–1.3)
BILIRUB SERPL-MCNC: 0.3 MG/DL (ref 0.2–1.3)
BILIRUB SERPL-MCNC: 0.4 MG/DL (ref 0.2–1.3)
BILIRUB UR QL STRIP: NEGATIVE
BILIRUB UR QL STRIP: NEGATIVE
BUN SERPL-MCNC: 22 MG/DL (ref 7–30)
BUN SERPL-MCNC: 22 MG/DL (ref 7–30)
BUN SERPL-MCNC: 23 MG/DL (ref 7–30)
BUN SERPL-MCNC: 23 MG/DL (ref 7–30)
BUN SERPL-MCNC: 25 MG/DL (ref 7–30)
BUN SERPL-MCNC: 26 MG/DL (ref 7–30)
BUN SERPL-MCNC: 28 MG/DL (ref 7–30)
BUN SERPL-MCNC: 29 MG/DL (ref 7–30)
BUN SERPL-MCNC: 30 MG/DL (ref 7–30)
BUN SERPL-MCNC: 32 MG/DL (ref 7–30)
BUN SERPL-MCNC: 32 MG/DL (ref 7–30)
BUN SERPL-MCNC: 34 MG/DL (ref 7–30)
C DIFF TOX B STL QL: NEGATIVE
CALCIUM SERPL-MCNC: 7.5 MG/DL (ref 8.5–10.1)
CALCIUM SERPL-MCNC: 7.7 MG/DL (ref 8.5–10.1)
CALCIUM SERPL-MCNC: 7.9 MG/DL (ref 8.5–10.1)
CALCIUM SERPL-MCNC: 8 MG/DL (ref 8.5–10.1)
CALCIUM SERPL-MCNC: 8.1 MG/DL (ref 8.5–10.1)
CALCIUM SERPL-MCNC: 8.2 MG/DL (ref 8.5–10.1)
CALCIUM SERPL-MCNC: 8.3 MG/DL (ref 8.5–10.1)
CALCIUM SERPL-MCNC: 8.4 MG/DL (ref 8.5–10.1)
CALCIUM SERPL-MCNC: 8.4 MG/DL (ref 8.5–10.1)
CALCIUM SERPL-MCNC: 8.5 MG/DL (ref 8.5–10.1)
CALCIUM SERPL-MCNC: 8.6 MG/DL (ref 8.5–10.1)
CALCIUM SERPL-MCNC: 8.6 MG/DL (ref 8.5–10.1)
CHLORIDE SERPL-SCNC: 104 MMOL/L (ref 94–109)
CHLORIDE SERPL-SCNC: 106 MMOL/L (ref 94–109)
CHLORIDE SERPL-SCNC: 106 MMOL/L (ref 94–109)
CHLORIDE SERPL-SCNC: 107 MMOL/L (ref 94–109)
CHLORIDE SERPL-SCNC: 107 MMOL/L (ref 94–109)
CHLORIDE SERPL-SCNC: 108 MMOL/L (ref 94–109)
CHLORIDE SERPL-SCNC: 108 MMOL/L (ref 94–109)
CHLORIDE SERPL-SCNC: 109 MMOL/L (ref 94–109)
CHLORIDE SERPL-SCNC: 110 MMOL/L (ref 94–109)
CHLORIDE SERPL-SCNC: 110 MMOL/L (ref 94–109)
CHLORIDE SERPL-SCNC: 111 MMOL/L (ref 94–109)
CHLORIDE SERPL-SCNC: 111 MMOL/L (ref 94–109)
CHLORIDE SERPL-SCNC: 112 MMOL/L (ref 94–109)
CK SERPL-CCNC: 67 U/L (ref 30–300)
CO2 SERPL-SCNC: 24 MMOL/L (ref 20–32)
CO2 SERPL-SCNC: 26 MMOL/L (ref 20–32)
CO2 SERPL-SCNC: 28 MMOL/L (ref 20–32)
CO2 SERPL-SCNC: 29 MMOL/L (ref 20–32)
CO2 SERPL-SCNC: 30 MMOL/L (ref 20–32)
CO2 SERPL-SCNC: 32 MMOL/L (ref 20–32)
COLOR UR AUTO: ABNORMAL
COLOR UR AUTO: ABNORMAL
CORTICOSTER 1H P 250 UG ACTH SERPL-SCNC: 32.1 UG/DL
CORTICOSTER 1H P 250 UG ACTH SERPL-SCNC: 5 UG/DL
CORTIS SERPL-MCNC: 18 UG/DL (ref 4–22)
CORTIS SERPL-MCNC: 4.9 UG/DL (ref 4–22)
CREAT SERPL-MCNC: 0.78 MG/DL (ref 0.66–1.25)
CREAT SERPL-MCNC: 0.83 MG/DL (ref 0.66–1.25)
CREAT SERPL-MCNC: 0.85 MG/DL (ref 0.66–1.25)
CREAT SERPL-MCNC: 0.86 MG/DL (ref 0.66–1.25)
CREAT SERPL-MCNC: 0.88 MG/DL (ref 0.66–1.25)
CREAT SERPL-MCNC: 0.88 MG/DL (ref 0.66–1.25)
CREAT SERPL-MCNC: 0.91 MG/DL (ref 0.66–1.25)
CREAT SERPL-MCNC: 0.91 MG/DL (ref 0.66–1.25)
CREAT SERPL-MCNC: 0.94 MG/DL (ref 0.66–1.25)
CREAT SERPL-MCNC: 1.01 MG/DL (ref 0.66–1.25)
CREAT SERPL-MCNC: 1.02 MG/DL (ref 0.66–1.25)
CREAT SERPL-MCNC: 1.04 MG/DL (ref 0.66–1.25)
CREAT SERPL-MCNC: 1.05 MG/DL (ref 0.66–1.25)
CREAT SERPL-MCNC: 1.06 MG/DL (ref 0.66–1.25)
CREAT SERPL-MCNC: 1.29 MG/DL (ref 0.66–1.25)
CREAT SERPL-MCNC: 1.31 MG/DL (ref 0.66–1.25)
CRP SERPL-MCNC: 11.6 MG/L (ref 0–8)
D DIMER PPP FEU-MCNC: 1.6 UG/ML FEU (ref 0–0.5)
D DIMER PPP FEU-MCNC: 2.3 UG/ML FEU (ref 0–0.5)
D DIMER PPP FEU-MCNC: 2.5 UG/ML FEU (ref 0–0.5)
DIFFERENTIAL METHOD BLD: ABNORMAL
EJECTION FRACTION: NORMAL %
EOSINOPHIL # BLD AUTO: 0 10E9/L (ref 0–0.7)
EOSINOPHIL # BLD AUTO: 0 10E9/L (ref 0–0.7)
EOSINOPHIL # BLD AUTO: 0.1 10E9/L (ref 0–0.7)
EOSINOPHIL # BLD AUTO: 0.2 10E9/L (ref 0–0.7)
EOSINOPHIL # BLD AUTO: 0.2 10E9/L (ref 0–0.7)
EOSINOPHIL # BLD AUTO: 0.3 10E9/L (ref 0–0.7)
EOSINOPHIL # BLD AUTO: 0.3 10E9/L (ref 0–0.7)
EOSINOPHIL # BLD AUTO: 0.5 10E9/L (ref 0–0.7)
EOSINOPHIL # BLD AUTO: 0.6 10E9/L (ref 0–0.7)
EOSINOPHIL NFR BLD AUTO: 0 %
EOSINOPHIL NFR BLD AUTO: 0.4 %
EOSINOPHIL NFR BLD AUTO: 0.5 %
EOSINOPHIL NFR BLD AUTO: 0.6 %
EOSINOPHIL NFR BLD AUTO: 0.7 %
EOSINOPHIL NFR BLD AUTO: 0.9 %
EOSINOPHIL NFR BLD AUTO: 0.9 %
EOSINOPHIL NFR BLD AUTO: 1.2 %
EOSINOPHIL NFR BLD AUTO: 1.7 %
EOSINOPHIL NFR BLD AUTO: 2.3 %
EOSINOPHIL NFR BLD AUTO: 3 %
EOSINOPHIL NFR BLD AUTO: 3.4 %
EOSINOPHIL NFR BLD AUTO: 4.7 %
EOSINOPHIL NFR BLD AUTO: 6.8 %
ERYTHROCYTE [DISTWIDTH] IN BLOOD BY AUTOMATED COUNT: 15.9 % (ref 10–15)
ERYTHROCYTE [DISTWIDTH] IN BLOOD BY AUTOMATED COUNT: 15.9 % (ref 10–15)
ERYTHROCYTE [DISTWIDTH] IN BLOOD BY AUTOMATED COUNT: 16.1 % (ref 10–15)
ERYTHROCYTE [DISTWIDTH] IN BLOOD BY AUTOMATED COUNT: 16.6 % (ref 10–15)
ERYTHROCYTE [DISTWIDTH] IN BLOOD BY AUTOMATED COUNT: 16.6 % (ref 10–15)
ERYTHROCYTE [DISTWIDTH] IN BLOOD BY AUTOMATED COUNT: 16.7 % (ref 10–15)
ERYTHROCYTE [DISTWIDTH] IN BLOOD BY AUTOMATED COUNT: 16.7 % (ref 10–15)
ERYTHROCYTE [DISTWIDTH] IN BLOOD BY AUTOMATED COUNT: 16.8 % (ref 10–15)
ERYTHROCYTE [DISTWIDTH] IN BLOOD BY AUTOMATED COUNT: 16.9 % (ref 10–15)
ERYTHROCYTE [DISTWIDTH] IN BLOOD BY AUTOMATED COUNT: 16.9 % (ref 10–15)
ERYTHROCYTE [DISTWIDTH] IN BLOOD BY AUTOMATED COUNT: 17 % (ref 10–15)
ERYTHROCYTE [DISTWIDTH] IN BLOOD BY AUTOMATED COUNT: 17.1 % (ref 10–15)
ERYTHROCYTE [DISTWIDTH] IN BLOOD BY AUTOMATED COUNT: 17.1 % (ref 10–15)
ERYTHROCYTE [DISTWIDTH] IN BLOOD BY AUTOMATED COUNT: 17.3 % (ref 10–15)
FERRITIN SERPL-MCNC: 368 NG/ML (ref 26–388)
FERRITIN SERPL-MCNC: 433 NG/ML (ref 26–388)
FOLATE SERPL-MCNC: 5.4 NG/ML
GBM IGG SER IA-ACNC: <0.2 AI (ref 0–0.9)
GFR SERPL CREATININE-BSD FRML MDRD: 52 ML/MIN/{1.73_M2}
GFR SERPL CREATININE-BSD FRML MDRD: 53 ML/MIN/{1.73_M2}
GFR SERPL CREATININE-BSD FRML MDRD: 67 ML/MIN/{1.73_M2}
GFR SERPL CREATININE-BSD FRML MDRD: 68 ML/MIN/{1.73_M2}
GFR SERPL CREATININE-BSD FRML MDRD: 69 ML/MIN/{1.73_M2}
GFR SERPL CREATININE-BSD FRML MDRD: 71 ML/MIN/{1.73_M2}
GFR SERPL CREATININE-BSD FRML MDRD: 71 ML/MIN/{1.73_M2}
GFR SERPL CREATININE-BSD FRML MDRD: 78 ML/MIN/{1.73_M2}
GFR SERPL CREATININE-BSD FRML MDRD: 81 ML/MIN/{1.73_M2}
GFR SERPL CREATININE-BSD FRML MDRD: 81 ML/MIN/{1.73_M2}
GFR SERPL CREATININE-BSD FRML MDRD: 83 ML/MIN/{1.73_M2}
GFR SERPL CREATININE-BSD FRML MDRD: 83 ML/MIN/{1.73_M2}
GFR SERPL CREATININE-BSD FRML MDRD: 84 ML/MIN/{1.73_M2}
GFR SERPL CREATININE-BSD FRML MDRD: 84 ML/MIN/{1.73_M2}
GFR SERPL CREATININE-BSD FRML MDRD: 85 ML/MIN/{1.73_M2}
GFR SERPL CREATININE-BSD FRML MDRD: 87 ML/MIN/{1.73_M2}
GLUCOSE BLDC GLUCOMTR-MCNC: 126 MG/DL (ref 70–99)
GLUCOSE SERPL-MCNC: 103 MG/DL (ref 70–99)
GLUCOSE SERPL-MCNC: 103 MG/DL (ref 70–99)
GLUCOSE SERPL-MCNC: 105 MG/DL (ref 70–99)
GLUCOSE SERPL-MCNC: 108 MG/DL (ref 70–99)
GLUCOSE SERPL-MCNC: 112 MG/DL (ref 70–99)
GLUCOSE SERPL-MCNC: 114 MG/DL (ref 70–99)
GLUCOSE SERPL-MCNC: 119 MG/DL (ref 70–99)
GLUCOSE SERPL-MCNC: 122 MG/DL (ref 70–99)
GLUCOSE SERPL-MCNC: 123 MG/DL (ref 70–99)
GLUCOSE SERPL-MCNC: 131 MG/DL (ref 70–99)
GLUCOSE SERPL-MCNC: 91 MG/DL (ref 70–99)
GLUCOSE SERPL-MCNC: 92 MG/DL (ref 70–99)
GLUCOSE SERPL-MCNC: 93 MG/DL (ref 70–99)
GLUCOSE SERPL-MCNC: 96 MG/DL (ref 70–99)
GLUCOSE UR STRIP-MCNC: NEGATIVE MG/DL
GLUCOSE UR STRIP-MCNC: NEGATIVE MG/DL
GRAM STN SPEC: ABNORMAL
HCO3 BLDV-SCNC: 27 MMOL/L (ref 21–28)
HCT VFR BLD AUTO: 31.5 % (ref 40–53)
HCT VFR BLD AUTO: 31.6 % (ref 40–53)
HCT VFR BLD AUTO: 32 % (ref 40–53)
HCT VFR BLD AUTO: 32.2 % (ref 40–53)
HCT VFR BLD AUTO: 32.7 % (ref 40–53)
HCT VFR BLD AUTO: 32.9 % (ref 40–53)
HCT VFR BLD AUTO: 33.2 % (ref 40–53)
HCT VFR BLD AUTO: 33.4 % (ref 40–53)
HCT VFR BLD AUTO: 33.5 % (ref 40–53)
HCT VFR BLD AUTO: 34.1 % (ref 40–53)
HCT VFR BLD AUTO: 34.3 % (ref 40–53)
HCT VFR BLD AUTO: 34.5 % (ref 40–53)
HCT VFR BLD AUTO: 34.6 % (ref 40–53)
HCT VFR BLD AUTO: 42.9 % (ref 40–53)
HGB BLD-MCNC: 10 G/DL (ref 13.3–17.7)
HGB BLD-MCNC: 10 G/DL (ref 13.3–17.7)
HGB BLD-MCNC: 10.2 G/DL (ref 13.3–17.7)
HGB BLD-MCNC: 10.2 G/DL (ref 13.3–17.7)
HGB BLD-MCNC: 10.3 G/DL (ref 13.3–17.7)
HGB BLD-MCNC: 10.3 G/DL (ref 13.3–17.7)
HGB BLD-MCNC: 10.4 G/DL (ref 13.3–17.7)
HGB BLD-MCNC: 10.5 G/DL (ref 13.3–17.7)
HGB BLD-MCNC: 10.6 G/DL (ref 13.3–17.7)
HGB BLD-MCNC: 10.8 G/DL (ref 13.3–17.7)
HGB BLD-MCNC: 10.9 G/DL (ref 13.3–17.7)
HGB BLD-MCNC: 10.9 G/DL (ref 13.3–17.7)
HGB BLD-MCNC: 13.5 G/DL (ref 13.3–17.7)
HGB BLD-MCNC: 9.9 G/DL (ref 13.3–17.7)
HGB UR QL STRIP: ABNORMAL
HGB UR QL STRIP: ABNORMAL
HYALINE CASTS #/AREA URNS LPF: 10 /LPF
IMM GRANULOCYTES # BLD: 0 10E9/L (ref 0–0.4)
IMM GRANULOCYTES # BLD: 0.1 10E9/L (ref 0–0.4)
IMM GRANULOCYTES NFR BLD: 0.2 %
IMM GRANULOCYTES NFR BLD: 0.3 %
IMM GRANULOCYTES NFR BLD: 0.4 %
IMM GRANULOCYTES NFR BLD: 0.5 %
IMM GRANULOCYTES NFR BLD: 0.5 %
IMM GRANULOCYTES NFR BLD: 0.6 %
INR PPP: 1.12 (ref 0.86–1.14)
INR PPP: 1.12 (ref 0.86–1.14)
IRON SATN MFR SERPL: 26 % (ref 15–46)
IRON SERPL-MCNC: 34 UG/DL (ref 35–180)
KETONES UR STRIP-MCNC: NEGATIVE MG/DL
KETONES UR STRIP-MCNC: NEGATIVE MG/DL
LACTATE BLD-SCNC: 0.8 MMOL/L (ref 0.7–2)
LACTATE BLD-SCNC: 0.9 MMOL/L (ref 0.7–2)
LDH SERPL L TO P-CCNC: 199 U/L (ref 85–227)
LEUKOCYTE ESTERASE UR QL STRIP: ABNORMAL
LEUKOCYTE ESTERASE UR QL STRIP: NEGATIVE
LIPASE SERPL-CCNC: 73 U/L (ref 73–393)
LYMPHOCYTES # BLD AUTO: 0.9 10E9/L (ref 0.8–5.3)
LYMPHOCYTES # BLD AUTO: 1 10E9/L (ref 0.8–5.3)
LYMPHOCYTES # BLD AUTO: 1.1 10E9/L (ref 0.8–5.3)
LYMPHOCYTES # BLD AUTO: 1.2 10E9/L (ref 0.8–5.3)
LYMPHOCYTES # BLD AUTO: 1.2 10E9/L (ref 0.8–5.3)
LYMPHOCYTES # BLD AUTO: 1.4 10E9/L (ref 0.8–5.3)
LYMPHOCYTES # BLD AUTO: 1.5 10E9/L (ref 0.8–5.3)
LYMPHOCYTES NFR BLD AUTO: 10.6 %
LYMPHOCYTES NFR BLD AUTO: 10.9 %
LYMPHOCYTES NFR BLD AUTO: 11.3 %
LYMPHOCYTES NFR BLD AUTO: 11.5 %
LYMPHOCYTES NFR BLD AUTO: 11.6 %
LYMPHOCYTES NFR BLD AUTO: 11.6 %
LYMPHOCYTES NFR BLD AUTO: 11.8 %
LYMPHOCYTES NFR BLD AUTO: 12.3 %
LYMPHOCYTES NFR BLD AUTO: 13.1 %
LYMPHOCYTES NFR BLD AUTO: 13.4 %
LYMPHOCYTES NFR BLD AUTO: 13.6 %
LYMPHOCYTES NFR BLD AUTO: 14.9 %
LYMPHOCYTES NFR BLD AUTO: 16 %
LYMPHOCYTES NFR BLD AUTO: 16.8 %
MAGNESIUM SERPL-MCNC: 1.6 MG/DL (ref 1.6–2.3)
MAGNESIUM SERPL-MCNC: 2.1 MG/DL (ref 1.6–2.3)
MCH RBC QN AUTO: 24.8 PG (ref 26.5–33)
MCH RBC QN AUTO: 25.1 PG (ref 26.5–33)
MCH RBC QN AUTO: 25.1 PG (ref 26.5–33)
MCH RBC QN AUTO: 25.2 PG (ref 26.5–33)
MCH RBC QN AUTO: 25.2 PG (ref 26.5–33)
MCH RBC QN AUTO: 25.3 PG (ref 26.5–33)
MCH RBC QN AUTO: 25.4 PG (ref 26.5–33)
MCH RBC QN AUTO: 25.4 PG (ref 26.5–33)
MCH RBC QN AUTO: 25.5 PG (ref 26.5–33)
MCH RBC QN AUTO: 25.5 PG (ref 26.5–33)
MCH RBC QN AUTO: 25.6 PG (ref 26.5–33)
MCH RBC QN AUTO: 25.6 PG (ref 26.5–33)
MCH RBC QN AUTO: 25.7 PG (ref 26.5–33)
MCH RBC QN AUTO: 25.9 PG (ref 26.5–33)
MCH RBC QN AUTO: 26.1 PG (ref 26.5–33)
MCH RBC QN AUTO: 26.1 PG (ref 26.5–33)
MCHC RBC AUTO-ENTMCNC: 30.8 G/DL (ref 31.5–36.5)
MCHC RBC AUTO-ENTMCNC: 31 G/DL (ref 31.5–36.5)
MCHC RBC AUTO-ENTMCNC: 31.1 G/DL (ref 31.5–36.5)
MCHC RBC AUTO-ENTMCNC: 31.2 G/DL (ref 31.5–36.5)
MCHC RBC AUTO-ENTMCNC: 31.3 G/DL (ref 31.5–36.5)
MCHC RBC AUTO-ENTMCNC: 31.4 G/DL (ref 31.5–36.5)
MCHC RBC AUTO-ENTMCNC: 31.5 G/DL (ref 31.5–36.5)
MCHC RBC AUTO-ENTMCNC: 31.5 G/DL (ref 31.5–36.5)
MCHC RBC AUTO-ENTMCNC: 31.6 G/DL (ref 31.5–36.5)
MCHC RBC AUTO-ENTMCNC: 31.6 G/DL (ref 31.5–36.5)
MCHC RBC AUTO-ENTMCNC: 31.7 G/DL (ref 31.5–36.5)
MCHC RBC AUTO-ENTMCNC: 31.8 G/DL (ref 31.5–36.5)
MCHC RBC AUTO-ENTMCNC: 31.9 G/DL (ref 31.5–36.5)
MCHC RBC AUTO-ENTMCNC: 31.9 G/DL (ref 31.5–36.5)
MCV RBC AUTO: 79 FL (ref 78–100)
MCV RBC AUTO: 80 FL (ref 78–100)
MCV RBC AUTO: 81 FL (ref 78–100)
MCV RBC AUTO: 82 FL (ref 78–100)
MCV RBC AUTO: 83 FL (ref 78–100)
MCV RBC AUTO: 83 FL (ref 78–100)
MONOCYTES # BLD AUTO: 0.4 10E9/L (ref 0–1.3)
MONOCYTES # BLD AUTO: 0.5 10E9/L (ref 0–1.3)
MONOCYTES # BLD AUTO: 0.6 10E9/L (ref 0–1.3)
MONOCYTES # BLD AUTO: 0.7 10E9/L (ref 0–1.3)
MONOCYTES # BLD AUTO: 0.8 10E9/L (ref 0–1.3)
MONOCYTES # BLD AUTO: 0.9 10E9/L (ref 0–1.3)
MONOCYTES # BLD AUTO: 1 10E9/L (ref 0–1.3)
MONOCYTES # BLD AUTO: 1 10E9/L (ref 0–1.3)
MONOCYTES NFR BLD AUTO: 10.2 %
MONOCYTES NFR BLD AUTO: 10.9 %
MONOCYTES NFR BLD AUTO: 11.2 %
MONOCYTES NFR BLD AUTO: 4 %
MONOCYTES NFR BLD AUTO: 5.3 %
MONOCYTES NFR BLD AUTO: 7.6 %
MONOCYTES NFR BLD AUTO: 7.7 %
MONOCYTES NFR BLD AUTO: 8.5 %
MONOCYTES NFR BLD AUTO: 8.7 %
MONOCYTES NFR BLD AUTO: 8.8 %
MONOCYTES NFR BLD AUTO: 8.8 %
MONOCYTES NFR BLD AUTO: 9.1 %
MONOCYTES NFR BLD AUTO: 9.5 %
MONOCYTES NFR BLD AUTO: 9.6 %
MUCOUS THREADS #/AREA URNS LPF: PRESENT /LPF
MUCOUS THREADS #/AREA URNS LPF: PRESENT /LPF
MYELOPEROXIDASE AB SER-ACNC: <0.2 AI (ref 0–0.9)
NEUTROPHILS # BLD AUTO: 5 10E9/L (ref 1.6–8.3)
NEUTROPHILS # BLD AUTO: 5.6 10E9/L (ref 1.6–8.3)
NEUTROPHILS # BLD AUTO: 5.6 10E9/L (ref 1.6–8.3)
NEUTROPHILS # BLD AUTO: 5.9 10E9/L (ref 1.6–8.3)
NEUTROPHILS # BLD AUTO: 6.3 10E9/L (ref 1.6–8.3)
NEUTROPHILS # BLD AUTO: 6.5 10E9/L (ref 1.6–8.3)
NEUTROPHILS # BLD AUTO: 6.8 10E9/L (ref 1.6–8.3)
NEUTROPHILS # BLD AUTO: 7 10E9/L (ref 1.6–8.3)
NEUTROPHILS # BLD AUTO: 7.1 10E9/L (ref 1.6–8.3)
NEUTROPHILS # BLD AUTO: 7.6 10E9/L (ref 1.6–8.3)
NEUTROPHILS # BLD AUTO: 7.7 10E9/L (ref 1.6–8.3)
NEUTROPHILS # BLD AUTO: 8.3 10E9/L (ref 1.6–8.3)
NEUTROPHILS NFR BLD AUTO: 67.8 %
NEUTROPHILS NFR BLD AUTO: 72.4 %
NEUTROPHILS NFR BLD AUTO: 72.6 %
NEUTROPHILS NFR BLD AUTO: 73.6 %
NEUTROPHILS NFR BLD AUTO: 74 %
NEUTROPHILS NFR BLD AUTO: 75 %
NEUTROPHILS NFR BLD AUTO: 76.2 %
NEUTROPHILS NFR BLD AUTO: 76.6 %
NEUTROPHILS NFR BLD AUTO: 76.7 %
NEUTROPHILS NFR BLD AUTO: 77.2 %
NEUTROPHILS NFR BLD AUTO: 77.2 %
NEUTROPHILS NFR BLD AUTO: 79.2 %
NEUTROPHILS NFR BLD AUTO: 79.2 %
NEUTROPHILS NFR BLD AUTO: 83.6 %
NEUTS BAND # BLD AUTO: 0.1 10E9/L (ref 0–0.6)
NEUTS BAND NFR BLD MANUAL: 1 %
NITRATE UR QL: NEGATIVE
NITRATE UR QL: NEGATIVE
NRBC # BLD AUTO: 0 10*3/UL
NRBC BLD AUTO-RTO: 0 /100
NT-PROBNP SERPL-MCNC: 3550 PG/ML (ref 0–1800)
NT-PROBNP SERPL-MCNC: 3730 PG/ML (ref 0–1800)
NT-PROBNP SERPL-MCNC: 4284 PG/ML (ref 0–1800)
O2/TOTAL GAS SETTING VFR VENT: ABNORMAL %
OXYHGB MFR BLDV: 72 %
PCO2 BLDV: 36 MM HG (ref 40–50)
PH BLDV: 7.48 PH (ref 7.32–7.43)
PH UR STRIP: 6.5 PH (ref 4.7–8)
PH UR STRIP: 7 PH (ref 4.7–8)
PLATELET # BLD AUTO: 135 10E9/L (ref 150–450)
PLATELET # BLD AUTO: 143 10E9/L (ref 150–450)
PLATELET # BLD AUTO: 144 10E9/L (ref 150–450)
PLATELET # BLD AUTO: 148 10E9/L (ref 150–450)
PLATELET # BLD AUTO: 151 10E9/L (ref 150–450)
PLATELET # BLD AUTO: 157 10E9/L (ref 150–450)
PLATELET # BLD AUTO: 158 10E9/L (ref 150–450)
PLATELET # BLD AUTO: 162 10E9/L (ref 150–450)
PLATELET # BLD AUTO: 162 10E9/L (ref 150–450)
PLATELET # BLD AUTO: 165 10E9/L (ref 150–450)
PLATELET # BLD AUTO: 166 10E9/L (ref 150–450)
PLATELET # BLD AUTO: 169 10E9/L (ref 150–450)
PLATELET # BLD AUTO: 173 10E9/L (ref 150–450)
PLATELET # BLD AUTO: 179 10E9/L (ref 150–450)
PLATELET # BLD AUTO: 187 10E9/L (ref 150–450)
PLATELET # BLD AUTO: 190 10E9/L (ref 150–450)
PLATELET # BLD EST: ABNORMAL 10*3/UL
PO2 BLDV: 36 MM HG (ref 25–47)
POTASSIUM SERPL-SCNC: 3.5 MMOL/L (ref 3.4–5.3)
POTASSIUM SERPL-SCNC: 3.6 MMOL/L (ref 3.4–5.3)
POTASSIUM SERPL-SCNC: 3.7 MMOL/L (ref 3.4–5.3)
POTASSIUM SERPL-SCNC: 3.8 MMOL/L (ref 3.4–5.3)
POTASSIUM SERPL-SCNC: 3.9 MMOL/L (ref 3.4–5.3)
POTASSIUM SERPL-SCNC: 4 MMOL/L (ref 3.4–5.3)
POTASSIUM SERPL-SCNC: 4.2 MMOL/L (ref 3.4–5.3)
PROCALCITONIN SERPL-MCNC: 0.06 NG/ML
PROCALCITONIN SERPL-MCNC: 0.07 NG/ML
PROT SERPL-MCNC: 5.2 G/DL (ref 6.8–8.8)
PROT SERPL-MCNC: 5.2 G/DL (ref 6.8–8.8)
PROT SERPL-MCNC: 5.4 G/DL (ref 6.8–8.8)
PROT SERPL-MCNC: 5.5 G/DL (ref 6.8–8.8)
PROT SERPL-MCNC: 5.7 G/DL (ref 6.8–8.8)
PROT SERPL-MCNC: 5.7 G/DL (ref 6.8–8.8)
PROT SERPL-MCNC: 5.9 G/DL (ref 6.8–8.8)
PROTEINASE3 IGG SER-ACNC: <0.2 AI (ref 0–0.9)
RBC # BLD AUTO: 3.9 10E12/L (ref 4.4–5.9)
RBC # BLD AUTO: 3.91 10E12/L (ref 4.4–5.9)
RBC # BLD AUTO: 3.91 10E12/L (ref 4.4–5.9)
RBC # BLD AUTO: 4.02 10E12/L (ref 4.4–5.9)
RBC # BLD AUTO: 4.06 10E12/L (ref 4.4–5.9)
RBC # BLD AUTO: 4.06 10E12/L (ref 4.4–5.9)
RBC # BLD AUTO: 4.09 10E12/L (ref 4.4–5.9)
RBC # BLD AUTO: 4.11 10E12/L (ref 4.4–5.9)
RBC # BLD AUTO: 4.12 10E12/L (ref 4.4–5.9)
RBC # BLD AUTO: 4.14 10E12/L (ref 4.4–5.9)
RBC # BLD AUTO: 4.16 10E12/L (ref 4.4–5.9)
RBC # BLD AUTO: 4.17 10E12/L (ref 4.4–5.9)
RBC # BLD AUTO: 4.17 10E12/L (ref 4.4–5.9)
RBC # BLD AUTO: 4.23 10E12/L (ref 4.4–5.9)
RBC # BLD AUTO: 4.28 10E12/L (ref 4.4–5.9)
RBC # BLD AUTO: 5.17 10E12/L (ref 4.4–5.9)
RBC #/AREA URNS AUTO: 2 /HPF (ref 0–2)
RBC #/AREA URNS AUTO: 8 /HPF (ref 0–2)
RBC MORPH BLD: ABNORMAL
SARS-COV-2 PCR COMMENT: NORMAL
SARS-COV-2 PCR COMMENT: NORMAL
SARS-COV-2 RNA SPEC QL NAA+PROBE: NEGATIVE
SARS-COV-2 RNA SPEC QL NAA+PROBE: NEGATIVE
SARS-COV-2 RNA SPEC QL NAA+PROBE: NORMAL
SARS-COV-2 RNA SPEC QL NAA+PROBE: NORMAL
SARS-COV-2 RNA SPEC QL NAA+PROBE: NOT DETECTED
SODIUM SERPL-SCNC: 139 MMOL/L (ref 133–144)
SODIUM SERPL-SCNC: 140 MMOL/L (ref 133–144)
SODIUM SERPL-SCNC: 141 MMOL/L (ref 133–144)
SODIUM SERPL-SCNC: 142 MMOL/L (ref 133–144)
SODIUM SERPL-SCNC: 142 MMOL/L (ref 133–144)
SOURCE: ABNORMAL
SOURCE: ABNORMAL
SP GR UR STRIP: 1.02 (ref 1–1.03)
SP GR UR STRIP: 1.03 (ref 1–1.03)
SPECIMEN SOURCE: ABNORMAL
SPECIMEN SOURCE: ABNORMAL
SPECIMEN SOURCE: NORMAL
TIBC SERPL-MCNC: 130 UG/DL (ref 240–430)
TROPONIN I SERPL-MCNC: 0.04 UG/L (ref 0–0.04)
TROPONIN I SERPL-MCNC: 0.04 UG/L (ref 0–0.04)
TROPONIN I SERPL-MCNC: 0.05 UG/L (ref 0–0.04)
TROPONIN I SERPL-MCNC: 0.06 UG/L (ref 0–0.04)
TROPONIN I SERPL-MCNC: 0.06 UG/L (ref 0–0.04)
TROPONIN I SERPL-MCNC: 0.07 UG/L (ref 0–0.04)
TROPONIN I SERPL-MCNC: 0.07 UG/L (ref 0–0.04)
TSH SERPL DL<=0.005 MIU/L-ACNC: 3.69 MU/L (ref 0.4–4)
TSH SERPL DL<=0.005 MIU/L-ACNC: 3.81 MU/L (ref 0.4–4)
UROBILINOGEN UR STRIP-MCNC: NORMAL MG/DL (ref 0–2)
UROBILINOGEN UR STRIP-MCNC: NORMAL MG/DL (ref 0–2)
VARIANT LYMPHS BLD QL SMEAR: PRESENT
VIT B12 SERPL-MCNC: 468 PG/ML (ref 193–986)
WBC # BLD AUTO: 10.7 10E9/L (ref 4–11)
WBC # BLD AUTO: 11 10E9/L (ref 4–11)
WBC # BLD AUTO: 6.8 10E9/L (ref 4–11)
WBC # BLD AUTO: 7.6 10E9/L (ref 4–11)
WBC # BLD AUTO: 7.6 10E9/L (ref 4–11)
WBC # BLD AUTO: 7.7 10E9/L (ref 4–11)
WBC # BLD AUTO: 7.7 10E9/L (ref 4–11)
WBC # BLD AUTO: 8.2 10E9/L (ref 4–11)
WBC # BLD AUTO: 8.2 10E9/L (ref 4–11)
WBC # BLD AUTO: 8.8 10E9/L (ref 4–11)
WBC # BLD AUTO: 9 10E9/L (ref 4–11)
WBC # BLD AUTO: 9.2 10E9/L (ref 4–11)
WBC # BLD AUTO: 9.6 10E9/L (ref 4–11)
WBC # BLD AUTO: 9.6 10E9/L (ref 4–11)
WBC #/AREA URNS AUTO: 3 /HPF (ref 0–5)
WBC #/AREA URNS AUTO: 7 /HPF (ref 0–5)

## 2020-01-01 PROCEDURE — 25000132 ZZH RX MED GY IP 250 OP 250 PS 637: Performed by: INTERNAL MEDICINE

## 2020-01-01 PROCEDURE — 83540 ASSAY OF IRON: CPT | Performed by: INTERNAL MEDICINE

## 2020-01-01 PROCEDURE — 93005 ELECTROCARDIOGRAM TRACING: CPT

## 2020-01-01 PROCEDURE — 80053 COMPREHEN METABOLIC PANEL: CPT | Performed by: INTERNAL MEDICINE

## 2020-01-01 PROCEDURE — G0378 HOSPITAL OBSERVATION PER HR: HCPCS

## 2020-01-01 PROCEDURE — 97530 THERAPEUTIC ACTIVITIES: CPT | Mod: GP

## 2020-01-01 PROCEDURE — 71045 X-RAY EXAM CHEST 1 VIEW: CPT | Mod: TC

## 2020-01-01 PROCEDURE — 80048 BASIC METABOLIC PNL TOTAL CA: CPT | Performed by: HOSPITALIST

## 2020-01-01 PROCEDURE — 36415 COLL VENOUS BLD VENIPUNCTURE: CPT | Performed by: HOSPITALIST

## 2020-01-01 PROCEDURE — 25000132 ZZH RX MED GY IP 250 OP 250 PS 637: Performed by: HOSPITALIST

## 2020-01-01 PROCEDURE — 99233 SBSQ HOSP IP/OBS HIGH 50: CPT | Performed by: HOSPITALIST

## 2020-01-01 PROCEDURE — 96365 THER/PROPH/DIAG IV INF INIT: CPT

## 2020-01-01 PROCEDURE — 83735 ASSAY OF MAGNESIUM: CPT | Performed by: INTERNAL MEDICINE

## 2020-01-01 PROCEDURE — 70450 CT HEAD/BRAIN W/O DYE: CPT | Mod: TC

## 2020-01-01 PROCEDURE — 87493 C DIFF AMPLIFIED PROBE: CPT | Performed by: EMERGENCY MEDICINE

## 2020-01-01 PROCEDURE — 36415 COLL VENOUS BLD VENIPUNCTURE: CPT | Performed by: EMERGENCY MEDICINE

## 2020-01-01 PROCEDURE — 25800030 ZZH RX IP 258 OP 636: Performed by: EMERGENCY MEDICINE

## 2020-01-01 PROCEDURE — 85379 FIBRIN DEGRADATION QUANT: CPT | Performed by: EMERGENCY MEDICINE

## 2020-01-01 PROCEDURE — 99239 HOSP IP/OBS DSCHRG MGMT >30: CPT | Performed by: INTERNAL MEDICINE

## 2020-01-01 PROCEDURE — 94640 AIRWAY INHALATION TREATMENT: CPT

## 2020-01-01 PROCEDURE — 36415 COLL VENOUS BLD VENIPUNCTURE: CPT | Performed by: INTERNAL MEDICINE

## 2020-01-01 PROCEDURE — 98940 CHIROPRACT MANJ 1-2 REGIONS: CPT | Mod: AT | Performed by: CHIROPRACTOR

## 2020-01-01 PROCEDURE — 94799 UNLISTED PULMONARY SVC/PX: CPT

## 2020-01-01 PROCEDURE — 87635 SARS-COV-2 COVID-19 AMP PRB: CPT | Performed by: INTERNAL MEDICINE

## 2020-01-01 PROCEDURE — 84484 ASSAY OF TROPONIN QUANT: CPT | Performed by: EMERGENCY MEDICINE

## 2020-01-01 PROCEDURE — 82024 ASSAY OF ACTH: CPT | Performed by: HOSPITALIST

## 2020-01-01 PROCEDURE — 99233 SBSQ HOSP IP/OBS HIGH 50: CPT | Performed by: INTERNAL MEDICINE

## 2020-01-01 PROCEDURE — G0463 HOSPITAL OUTPT CLINIC VISIT: HCPCS

## 2020-01-01 PROCEDURE — 40000275 ZZH STATISTIC RCP TIME EA 10 MIN

## 2020-01-01 PROCEDURE — 84484 ASSAY OF TROPONIN QUANT: CPT | Performed by: INTERNAL MEDICINE

## 2020-01-01 PROCEDURE — 85025 COMPLETE CBC W/AUTO DIFF WBC: CPT | Performed by: EMERGENCY MEDICINE

## 2020-01-01 PROCEDURE — 40000268 ZZH STATISTIC NO CHARGES

## 2020-01-01 PROCEDURE — 82533 TOTAL CORTISOL: CPT | Performed by: INTERNAL MEDICINE

## 2020-01-01 PROCEDURE — 84484 ASSAY OF TROPONIN QUANT: CPT | Mod: 91 | Performed by: EMERGENCY MEDICINE

## 2020-01-01 PROCEDURE — 83605 ASSAY OF LACTIC ACID: CPT | Performed by: EMERGENCY MEDICINE

## 2020-01-01 PROCEDURE — 82728 ASSAY OF FERRITIN: CPT | Performed by: INTERNAL MEDICINE

## 2020-01-01 PROCEDURE — 83516 IMMUNOASSAY NONANTIBODY: CPT | Performed by: INTERNAL MEDICINE

## 2020-01-01 PROCEDURE — 25000125 ZZHC RX 250

## 2020-01-01 PROCEDURE — 85025 COMPLETE CBC W/AUTO DIFF WBC: CPT | Performed by: HOSPITALIST

## 2020-01-01 PROCEDURE — 82746 ASSAY OF FOLIC ACID SERUM: CPT | Performed by: INTERNAL MEDICINE

## 2020-01-01 PROCEDURE — 97530 THERAPEUTIC ACTIVITIES: CPT | Mod: GP | Performed by: PHYSICAL THERAPIST

## 2020-01-01 PROCEDURE — 12000000 ZZH R&B MED SURG/OB

## 2020-01-01 PROCEDURE — 93010 ELECTROCARDIOGRAM REPORT: CPT | Performed by: INTERNAL MEDICINE

## 2020-01-01 PROCEDURE — 99223 1ST HOSP IP/OBS HIGH 75: CPT | Mod: AI | Performed by: INTERNAL MEDICINE

## 2020-01-01 PROCEDURE — 85025 COMPLETE CBC W/AUTO DIFF WBC: CPT | Performed by: INTERNAL MEDICINE

## 2020-01-01 PROCEDURE — 99285 EMERGENCY DEPT VISIT HI MDM: CPT | Mod: 25

## 2020-01-01 PROCEDURE — 83880 ASSAY OF NATRIURETIC PEPTIDE: CPT | Performed by: INTERNAL MEDICINE

## 2020-01-01 PROCEDURE — 70498 CT ANGIOGRAPHY NECK: CPT | Mod: TC

## 2020-01-01 PROCEDURE — 80053 COMPREHEN METABOLIC PANEL: CPT | Performed by: EMERGENCY MEDICINE

## 2020-01-01 PROCEDURE — 99285 EMERGENCY DEPT VISIT HI MDM: CPT | Mod: Z6 | Performed by: EMERGENCY MEDICINE

## 2020-01-01 PROCEDURE — 97110 THERAPEUTIC EXERCISES: CPT | Mod: GO

## 2020-01-01 PROCEDURE — 96360 HYDRATION IV INFUSION INIT: CPT

## 2020-01-01 PROCEDURE — 96376 TX/PRO/DX INJ SAME DRUG ADON: CPT

## 2020-01-01 PROCEDURE — 70551 MRI BRAIN STEM W/O DYE: CPT | Mod: TC

## 2020-01-01 PROCEDURE — 98941 CHIROPRACT MANJ 3-4 REGIONS: CPT | Mod: AT | Performed by: CHIROPRACTOR

## 2020-01-01 PROCEDURE — 82533 TOTAL CORTISOL: CPT | Performed by: HOSPITALIST

## 2020-01-01 PROCEDURE — 85025 COMPLETE CBC W/AUTO DIFF WBC: CPT | Mod: ZL | Performed by: INTERNAL MEDICINE

## 2020-01-01 PROCEDURE — 25000128 H RX IP 250 OP 636: Performed by: INTERNAL MEDICINE

## 2020-01-01 PROCEDURE — 25000128 H RX IP 250 OP 636: Performed by: HOSPITALIST

## 2020-01-01 PROCEDURE — 87205 SMEAR GRAM STAIN: CPT | Performed by: INTERNAL MEDICINE

## 2020-01-01 PROCEDURE — 83880 ASSAY OF NATRIURETIC PEPTIDE: CPT | Mod: GZ | Performed by: EMERGENCY MEDICINE

## 2020-01-01 PROCEDURE — 85027 COMPLETE CBC AUTOMATED: CPT | Performed by: HOSPITALIST

## 2020-01-01 PROCEDURE — 80053 COMPREHEN METABOLIC PANEL: CPT | Mod: ZL | Performed by: INTERNAL MEDICINE

## 2020-01-01 PROCEDURE — 99232 SBSQ HOSP IP/OBS MODERATE 35: CPT | Performed by: HOSPITALIST

## 2020-01-01 PROCEDURE — 40000185 ZZHC STATISTIC PT OUTPT VISIT

## 2020-01-01 PROCEDURE — 93971 EXTREMITY STUDY: CPT | Mod: TC,RT

## 2020-01-01 PROCEDURE — 97161 PT EVAL LOW COMPLEX 20 MIN: CPT | Mod: GP

## 2020-01-01 PROCEDURE — 81001 URINALYSIS AUTO W/SCOPE: CPT | Performed by: EMERGENCY MEDICINE

## 2020-01-01 PROCEDURE — 25000128 H RX IP 250 OP 636

## 2020-01-01 PROCEDURE — 86140 C-REACTIVE PROTEIN: CPT | Performed by: INTERNAL MEDICINE

## 2020-01-01 PROCEDURE — 99203 OFFICE O/P NEW LOW 30 MIN: CPT | Performed by: UROLOGY

## 2020-01-01 PROCEDURE — 25000132 ZZH RX MED GY IP 250 OP 250 PS 637: Performed by: EMERGENCY MEDICINE

## 2020-01-01 PROCEDURE — 82550 ASSAY OF CK (CPK): CPT | Performed by: INTERNAL MEDICINE

## 2020-01-01 PROCEDURE — 36415 COLL VENOUS BLD VENIPUNCTURE: CPT | Mod: ZL | Performed by: INTERNAL MEDICINE

## 2020-01-01 PROCEDURE — 87070 CULTURE OTHR SPECIMN AEROBIC: CPT | Performed by: INTERNAL MEDICINE

## 2020-01-01 PROCEDURE — 83876 ASSAY MYELOPEROXIDASE: CPT | Performed by: INTERNAL MEDICINE

## 2020-01-01 PROCEDURE — 25800030 ZZH RX IP 258 OP 636: Performed by: INTERNAL MEDICINE

## 2020-01-01 PROCEDURE — 99283 EMERGENCY DEPT VISIT LOW MDM: CPT

## 2020-01-01 PROCEDURE — 84443 ASSAY THYROID STIM HORMONE: CPT | Performed by: HOSPITALIST

## 2020-01-01 PROCEDURE — 84443 ASSAY THYROID STIM HORMONE: CPT | Performed by: EMERGENCY MEDICINE

## 2020-01-01 PROCEDURE — 97163 PT EVAL HIGH COMPLEX 45 MIN: CPT | Mod: GP

## 2020-01-01 PROCEDURE — 87106 FUNGI IDENTIFICATION YEAST: CPT | Performed by: INTERNAL MEDICINE

## 2020-01-01 PROCEDURE — 85730 THROMBOPLASTIN TIME PARTIAL: CPT | Performed by: EMERGENCY MEDICINE

## 2020-01-01 PROCEDURE — 82607 VITAMIN B-12: CPT | Performed by: INTERNAL MEDICINE

## 2020-01-01 PROCEDURE — 85610 PROTHROMBIN TIME: CPT | Performed by: EMERGENCY MEDICINE

## 2020-01-01 PROCEDURE — 71275 CT ANGIOGRAPHY CHEST: CPT | Mod: TC

## 2020-01-01 PROCEDURE — 96366 THER/PROPH/DIAG IV INF ADDON: CPT

## 2020-01-01 PROCEDURE — 97165 OT EVAL LOW COMPLEX 30 MIN: CPT | Mod: GO

## 2020-01-01 PROCEDURE — 82805 BLOOD GASES W/O2 SATURATION: CPT | Performed by: EMERGENCY MEDICINE

## 2020-01-01 PROCEDURE — 85379 FIBRIN DEGRADATION QUANT: CPT | Performed by: INTERNAL MEDICINE

## 2020-01-01 PROCEDURE — 80048 BASIC METABOLIC PNL TOTAL CA: CPT | Performed by: EMERGENCY MEDICINE

## 2020-01-01 PROCEDURE — 83615 LACTATE (LD) (LDH) ENZYME: CPT | Performed by: INTERNAL MEDICINE

## 2020-01-01 PROCEDURE — 74177 CT ABD & PELVIS W/CONTRAST: CPT | Mod: TC

## 2020-01-01 PROCEDURE — 99309 SBSQ NF CARE MODERATE MDM 30: CPT | Mod: 95 | Performed by: FAMILY MEDICINE

## 2020-01-01 PROCEDURE — 97110 THERAPEUTIC EXERCISES: CPT | Mod: GP

## 2020-01-01 PROCEDURE — 99232 SBSQ HOSP IP/OBS MODERATE 35: CPT | Performed by: INTERNAL MEDICINE

## 2020-01-01 PROCEDURE — 70496 CT ANGIOGRAPHY HEAD: CPT | Mod: TC

## 2020-01-01 PROCEDURE — 99207 ZZC NO CHARGE NURSE ONLY: CPT

## 2020-01-01 PROCEDURE — 83880 ASSAY OF NATRIURETIC PEPTIDE: CPT | Performed by: HOSPITALIST

## 2020-01-01 PROCEDURE — 83550 IRON BINDING TEST: CPT | Performed by: INTERNAL MEDICINE

## 2020-01-01 PROCEDURE — 00000146 ZZHCL STATISTIC GLUCOSE BY METER IP

## 2020-01-01 PROCEDURE — 84145 PROCALCITONIN (PCT): CPT | Performed by: INTERNAL MEDICINE

## 2020-01-01 PROCEDURE — P9047 ALBUMIN (HUMAN), 25%, 50ML: HCPCS | Performed by: INTERNAL MEDICINE

## 2020-01-01 PROCEDURE — 25000128 H RX IP 250 OP 636: Performed by: RADIOLOGY

## 2020-01-01 PROCEDURE — 25000128 H RX IP 250 OP 636: Performed by: EMERGENCY MEDICINE

## 2020-01-01 PROCEDURE — 99204 OFFICE O/P NEW MOD 45 MIN: CPT | Mod: 25 | Performed by: INTERNAL MEDICINE

## 2020-01-01 PROCEDURE — 87040 BLOOD CULTURE FOR BACTERIA: CPT | Performed by: INTERNAL MEDICINE

## 2020-01-01 PROCEDURE — 83690 ASSAY OF LIPASE: CPT | Performed by: EMERGENCY MEDICINE

## 2020-01-01 PROCEDURE — 87635 SARS-COV-2 COVID-19 AMP PRB: CPT | Mod: ZL | Performed by: INTERNAL MEDICINE

## 2020-01-01 PROCEDURE — 94640 AIRWAY INHALATION TREATMENT: CPT | Mod: 76

## 2020-01-01 PROCEDURE — 86038 ANTINUCLEAR ANTIBODIES: CPT | Performed by: INTERNAL MEDICINE

## 2020-01-01 PROCEDURE — 99283 EMERGENCY DEPT VISIT LOW MDM: CPT | Mod: 25,27

## 2020-01-01 PROCEDURE — 84145 PROCALCITONIN (PCT): CPT | Performed by: HOSPITALIST

## 2020-01-01 RX ORDER — FUROSEMIDE 10 MG/ML
40 INJECTION INTRAMUSCULAR; INTRAVENOUS ONCE
Status: COMPLETED | OUTPATIENT
Start: 2020-01-01 | End: 2020-01-01

## 2020-01-01 RX ORDER — NALOXONE HYDROCHLORIDE 0.4 MG/ML
.1-.4 INJECTION, SOLUTION INTRAMUSCULAR; INTRAVENOUS; SUBCUTANEOUS
Status: DISCONTINUED | OUTPATIENT
Start: 2020-01-01 | End: 2020-01-01 | Stop reason: HOSPADM

## 2020-01-01 RX ORDER — MECOBALAMIN 5000 MCG
15 TABLET,DISINTEGRATING ORAL DAILY
COMMUNITY
Start: 2020-01-01

## 2020-01-01 RX ORDER — QUETIAPINE FUMARATE 25 MG/1
25 TABLET, FILM COATED ORAL AT BEDTIME
COMMUNITY
Start: 2020-01-01 | End: 2020-01-01

## 2020-01-01 RX ORDER — FUROSEMIDE 20 MG
20 TABLET ORAL DAILY
COMMUNITY
Start: 2020-01-01 | End: 2020-01-01

## 2020-01-01 RX ORDER — SODIUM CHLORIDE 9 MG/ML
1000 INJECTION, SOLUTION INTRAVENOUS CONTINUOUS
Status: DISCONTINUED | OUTPATIENT
Start: 2020-01-01 | End: 2020-01-01

## 2020-01-01 RX ORDER — LIDOCAINE 40 MG/G
CREAM TOPICAL
Status: DISCONTINUED | OUTPATIENT
Start: 2020-01-01 | End: 2020-01-01

## 2020-01-01 RX ORDER — FUROSEMIDE 40 MG
40 TABLET ORAL
Status: DISCONTINUED | OUTPATIENT
Start: 2020-01-01 | End: 2020-01-01

## 2020-01-01 RX ORDER — IPRATROPIUM BROMIDE AND ALBUTEROL SULFATE 2.5; .5 MG/3ML; MG/3ML
SOLUTION RESPIRATORY (INHALATION)
Status: COMPLETED
Start: 2020-01-01 | End: 2020-01-01

## 2020-01-01 RX ORDER — DOXYCYCLINE HYCLATE 100 MG
100 TABLET ORAL EVERY 12 HOURS SCHEDULED
Status: DISCONTINUED | OUTPATIENT
Start: 2020-01-01 | End: 2020-01-01

## 2020-01-01 RX ORDER — LANOLIN ALCOHOL/MO/W.PET/CERES
3 CREAM (GRAM) TOPICAL AT BEDTIME
Status: DISCONTINUED | OUTPATIENT
Start: 2020-01-01 | End: 2020-01-01

## 2020-01-01 RX ORDER — TAMSULOSIN HYDROCHLORIDE 0.4 MG/1
0.4 CAPSULE ORAL DAILY
Status: ON HOLD | COMMUNITY
Start: 2020-01-01 | End: 2020-01-01

## 2020-01-01 RX ORDER — GUAIFENESIN/DEXTROMETHORPHAN 100-10MG/5
5 SYRUP ORAL EVERY 6 HOURS PRN
Status: DISCONTINUED | OUTPATIENT
Start: 2020-01-01 | End: 2020-01-01 | Stop reason: HOSPADM

## 2020-01-01 RX ORDER — TAMSULOSIN HYDROCHLORIDE 0.4 MG/1
0.4 CAPSULE ORAL DAILY
Status: DISCONTINUED | OUTPATIENT
Start: 2020-01-01 | End: 2020-01-01

## 2020-01-01 RX ORDER — ALBUMIN (HUMAN) 12.5 G/50ML
50 SOLUTION INTRAVENOUS ONCE
Status: COMPLETED | OUTPATIENT
Start: 2020-01-01 | End: 2020-01-01

## 2020-01-01 RX ORDER — NITROGLYCERIN 0.4 MG/1
0.4 TABLET SUBLINGUAL EVERY 5 MIN PRN
Status: DISCONTINUED | OUTPATIENT
Start: 2020-01-01 | End: 2020-01-01 | Stop reason: HOSPADM

## 2020-01-01 RX ORDER — ACETAMINOPHEN 325 MG/1
650 TABLET ORAL EVERY 4 HOURS PRN
Status: DISCONTINUED | OUTPATIENT
Start: 2020-01-01 | End: 2020-01-01 | Stop reason: HOSPADM

## 2020-01-01 RX ORDER — ASPIRIN 81 MG/1
162 TABLET ORAL DAILY
Status: DISCONTINUED | OUTPATIENT
Start: 2020-01-01 | End: 2020-01-01 | Stop reason: HOSPADM

## 2020-01-01 RX ORDER — ASPIRIN 81 MG/1
324 TABLET, CHEWABLE ORAL ONCE
Status: COMPLETED | OUTPATIENT
Start: 2020-01-01 | End: 2020-01-01

## 2020-01-01 RX ORDER — FUROSEMIDE 10 MG/ML
20 INJECTION INTRAMUSCULAR; INTRAVENOUS ONCE
Status: COMPLETED | OUTPATIENT
Start: 2020-01-01 | End: 2020-01-01

## 2020-01-01 RX ORDER — FUROSEMIDE 20 MG
60 TABLET ORAL DAILY
COMMUNITY
End: 2020-01-01

## 2020-01-01 RX ORDER — ACETAMINOPHEN 650 MG/1
650 SUPPOSITORY RECTAL EVERY 4 HOURS PRN
Status: DISCONTINUED | OUTPATIENT
Start: 2020-01-01 | End: 2020-01-01 | Stop reason: HOSPADM

## 2020-01-01 RX ORDER — SODIUM CHLORIDE 9 MG/ML
1000 INJECTION, SOLUTION INTRAVENOUS CONTINUOUS
Status: DISCONTINUED | OUTPATIENT
Start: 2020-01-01 | End: 2020-01-01 | Stop reason: HOSPADM

## 2020-01-01 RX ORDER — FINASTERIDE 5 MG/1
5 TABLET, FILM COATED ORAL DAILY
COMMUNITY
Start: 2020-01-01

## 2020-01-01 RX ORDER — ALBUTEROL SULFATE 90 UG/1
2 AEROSOL, METERED RESPIRATORY (INHALATION) EVERY 4 HOURS PRN
COMMUNITY
End: 2020-01-01

## 2020-01-01 RX ORDER — IPRATROPIUM BROMIDE AND ALBUTEROL SULFATE 2.5; .5 MG/3ML; MG/3ML
3 SOLUTION RESPIRATORY (INHALATION) EVERY 4 HOURS PRN
Status: DISCONTINUED | OUTPATIENT
Start: 2020-01-01 | End: 2020-01-01 | Stop reason: HOSPADM

## 2020-01-01 RX ORDER — DEXAMETHASONE SODIUM PHOSPHATE 10 MG/ML
4 INJECTION, SOLUTION INTRAMUSCULAR; INTRAVENOUS ONCE
Status: COMPLETED | OUTPATIENT
Start: 2020-01-01 | End: 2020-01-01

## 2020-01-01 RX ORDER — ATORVASTATIN CALCIUM 40 MG/1
80 TABLET, FILM COATED ORAL EVERY EVENING
Status: DISCONTINUED | OUTPATIENT
Start: 2020-01-01 | End: 2020-01-01 | Stop reason: HOSPADM

## 2020-01-01 RX ORDER — FINASTERIDE 5 MG/1
5 TABLET, FILM COATED ORAL DAILY
Status: DISCONTINUED | OUTPATIENT
Start: 2020-01-01 | End: 2020-01-01

## 2020-01-01 RX ORDER — HEPARIN SODIUM 10000 [USP'U]/100ML
0-3500 INJECTION, SOLUTION INTRAVENOUS CONTINUOUS
Status: DISCONTINUED | OUTPATIENT
Start: 2020-01-01 | End: 2020-01-01 | Stop reason: HOSPADM

## 2020-01-01 RX ORDER — ALBUMIN (HUMAN) 12.5 G/50ML
75 SOLUTION INTRAVENOUS ONCE
Status: COMPLETED | OUTPATIENT
Start: 2020-01-01 | End: 2020-01-01

## 2020-01-01 RX ORDER — IOPAMIDOL 755 MG/ML
50 INJECTION, SOLUTION INTRAVASCULAR ONCE
Status: COMPLETED | OUTPATIENT
Start: 2020-01-01 | End: 2020-01-01

## 2020-01-01 RX ORDER — MOXIFLOXACIN HYDROCHLORIDE 400 MG/1
400 TABLET ORAL DAILY
COMMUNITY
Start: 2020-01-01 | End: 2020-01-01

## 2020-01-01 RX ORDER — BUDESONIDE AND FORMOTEROL FUMARATE DIHYDRATE 160; 4.5 UG/1; UG/1
2 AEROSOL RESPIRATORY (INHALATION) 2 TIMES DAILY
COMMUNITY

## 2020-01-01 RX ORDER — LOPERAMIDE HCL 2 MG
2 CAPSULE ORAL
COMMUNITY
Start: 2020-01-01 | End: 2020-01-01

## 2020-01-01 RX ORDER — SODIUM CHLORIDE 9 MG/ML
INJECTION, SOLUTION INTRAVENOUS CONTINUOUS
Status: DISCONTINUED | OUTPATIENT
Start: 2020-01-01 | End: 2020-01-01

## 2020-01-01 RX ORDER — MIDODRINE HYDROCHLORIDE 5 MG/1
10 TABLET ORAL 3 TIMES DAILY
Status: ON HOLD | COMMUNITY
Start: 2020-01-01 | End: 2020-01-01

## 2020-01-01 RX ORDER — CLOPIDOGREL BISULFATE 75 MG/1
75 TABLET ORAL DAILY
Status: DISCONTINUED | OUTPATIENT
Start: 2020-01-01 | End: 2020-01-01 | Stop reason: HOSPADM

## 2020-01-01 RX ORDER — ONDANSETRON 2 MG/ML
4 INJECTION INTRAMUSCULAR; INTRAVENOUS EVERY 6 HOURS PRN
Status: DISCONTINUED | OUTPATIENT
Start: 2020-01-01 | End: 2020-01-01 | Stop reason: HOSPADM

## 2020-01-01 RX ORDER — LANOLIN ALCOHOL/MO/W.PET/CERES
3 CREAM (GRAM) TOPICAL AT BEDTIME
Status: DISCONTINUED | OUTPATIENT
Start: 2020-01-01 | End: 2020-01-01 | Stop reason: HOSPADM

## 2020-01-01 RX ORDER — METHYLPREDNISOLONE SODIUM SUCCINATE 125 MG/2ML
125 INJECTION, POWDER, LYOPHILIZED, FOR SOLUTION INTRAMUSCULAR; INTRAVENOUS ONCE
Status: COMPLETED | OUTPATIENT
Start: 2020-01-01 | End: 2020-01-01

## 2020-01-01 RX ORDER — POTASSIUM CHLORIDE 750 MG/1
20 CAPSULE, EXTENDED RELEASE ORAL 2 TIMES DAILY
Status: COMPLETED | OUTPATIENT
Start: 2020-01-01 | End: 2020-01-01

## 2020-01-01 RX ORDER — FLUDROCORTISONE ACETATE 0.1 MG/1
200 TABLET ORAL DAILY
Status: DISCONTINUED | OUTPATIENT
Start: 2020-01-01 | End: 2020-01-01 | Stop reason: HOSPADM

## 2020-01-01 RX ORDER — DROXIDOPA 300 MG/1
600 CAPSULE ORAL 3 TIMES DAILY
COMMUNITY
Start: 2020-01-01 | End: 2020-09-15

## 2020-01-01 RX ORDER — IPRATROPIUM BROMIDE AND ALBUTEROL SULFATE 2.5; .5 MG/3ML; MG/3ML
3 SOLUTION RESPIRATORY (INHALATION) ONCE
Status: DISCONTINUED | OUTPATIENT
Start: 2020-01-01 | End: 2020-01-01

## 2020-01-01 RX ORDER — FLUDROCORTISONE ACETATE 0.1 MG/1
100 TABLET ORAL DAILY
Status: DISCONTINUED | OUTPATIENT
Start: 2020-01-01 | End: 2020-01-01

## 2020-01-01 RX ORDER — MIDODRINE HYDROCHLORIDE 2.5 MG/1
12.5 TABLET ORAL 3 TIMES DAILY
Qty: 450 TABLET | Refills: 3 | Status: SHIPPED | OUTPATIENT
Start: 2020-01-01 | End: 2020-09-25

## 2020-01-01 RX ORDER — FUROSEMIDE 10 MG/ML
40 INJECTION INTRAMUSCULAR; INTRAVENOUS EVERY 8 HOURS
Status: DISCONTINUED | OUTPATIENT
Start: 2020-01-01 | End: 2020-01-01

## 2020-01-01 RX ORDER — GUAIFENESIN/DEXTROMETHORPHAN 100-10MG/5
5 SYRUP ORAL EVERY 6 HOURS
Status: DISCONTINUED | OUTPATIENT
Start: 2020-01-01 | End: 2020-01-01

## 2020-01-01 RX ORDER — ASPIRIN 81 MG/1
162 TABLET ORAL DAILY
COMMUNITY

## 2020-01-01 RX ORDER — SENNOSIDES 8.6 MG
2 TABLET ORAL 2 TIMES DAILY
Status: DISCONTINUED | OUTPATIENT
Start: 2020-01-01 | End: 2020-01-01

## 2020-01-01 RX ORDER — FUROSEMIDE 10 MG/ML
40 INJECTION INTRAMUSCULAR; INTRAVENOUS ONCE
Status: DISCONTINUED | OUTPATIENT
Start: 2020-01-01 | End: 2020-01-01

## 2020-01-01 RX ORDER — FUROSEMIDE 40 MG
40 TABLET ORAL DAILY
Status: DISCONTINUED | OUTPATIENT
Start: 2020-01-01 | End: 2020-01-01

## 2020-01-01 RX ORDER — QUETIAPINE FUMARATE 25 MG/1
25 TABLET, FILM COATED ORAL AT BEDTIME
Status: DISCONTINUED | OUTPATIENT
Start: 2020-01-01 | End: 2020-01-01

## 2020-01-01 RX ORDER — COSYNTROPIN 0.25 MG/ML
250 INJECTION, POWDER, FOR SOLUTION INTRAMUSCULAR; INTRAVENOUS ONCE
Status: COMPLETED | OUTPATIENT
Start: 2020-01-01 | End: 2020-01-01

## 2020-01-01 RX ORDER — MIDODRINE HYDROCHLORIDE 5 MG/1
10 TABLET ORAL 3 TIMES DAILY
Status: DISCONTINUED | OUTPATIENT
Start: 2020-01-01 | End: 2020-01-01

## 2020-01-01 RX ORDER — IOPAMIDOL 755 MG/ML
100 INJECTION, SOLUTION INTRAVASCULAR ONCE
Status: DISCONTINUED | OUTPATIENT
Start: 2020-01-01 | End: 2020-01-01

## 2020-01-01 RX ORDER — POLYETHYLENE GLYCOL 3350 17 G/17G
17 POWDER, FOR SOLUTION ORAL DAILY PRN
Status: DISCONTINUED | OUTPATIENT
Start: 2020-01-01 | End: 2020-01-01 | Stop reason: HOSPADM

## 2020-01-01 RX ORDER — ONDANSETRON 4 MG/1
4 TABLET, ORALLY DISINTEGRATING ORAL EVERY 6 HOURS PRN
Status: DISCONTINUED | OUTPATIENT
Start: 2020-01-01 | End: 2020-01-01 | Stop reason: HOSPADM

## 2020-01-01 RX ORDER — FLUDROCORTISONE ACETATE 0.1 MG/1
0.2 TABLET ORAL DAILY
Qty: 60 TABLET | Refills: 3 | Status: SHIPPED | OUTPATIENT
Start: 2020-01-01 | End: 2020-09-26

## 2020-01-01 RX ORDER — POTASSIUM CHLORIDE 750 MG/1
20 CAPSULE, EXTENDED RELEASE ORAL DAILY
Status: DISCONTINUED | OUTPATIENT
Start: 2020-01-01 | End: 2020-01-01 | Stop reason: HOSPADM

## 2020-01-01 RX ORDER — COSYNTROPIN 0.25 MG/ML
250 INJECTION, POWDER, FOR SOLUTION INTRAMUSCULAR; INTRAVENOUS ONCE
Status: DISCONTINUED | OUTPATIENT
Start: 2020-01-01 | End: 2020-01-01

## 2020-01-01 RX ORDER — BUDESONIDE AND FORMOTEROL FUMARATE DIHYDRATE 160; 4.5 UG/1; UG/1
2 AEROSOL RESPIRATORY (INHALATION) 2 TIMES DAILY
Status: DISCONTINUED | OUTPATIENT
Start: 2020-01-01 | End: 2020-01-01 | Stop reason: CLARIF

## 2020-01-01 RX ORDER — FUROSEMIDE 40 MG
20 TABLET ORAL DAILY
COMMUNITY
Start: 2020-01-01 | End: 2020-01-01

## 2020-01-01 RX ORDER — LIDOCAINE 40 MG/G
CREAM TOPICAL
Status: DISCONTINUED | OUTPATIENT
Start: 2020-01-01 | End: 2020-01-01 | Stop reason: HOSPADM

## 2020-01-01 RX ORDER — PANTOPRAZOLE SODIUM 20 MG/1
20 TABLET, DELAYED RELEASE ORAL
Status: DISCONTINUED | OUTPATIENT
Start: 2020-01-01 | End: 2020-01-01 | Stop reason: HOSPADM

## 2020-01-01 RX ORDER — MAGNESIUM SULFATE HEPTAHYDRATE 40 MG/ML
2 INJECTION, SOLUTION INTRAVENOUS ONCE
Status: COMPLETED | OUTPATIENT
Start: 2020-01-01 | End: 2020-01-01

## 2020-01-01 RX ORDER — METOPROLOL SUCCINATE 25 MG/1
12.5 TABLET, EXTENDED RELEASE ORAL 2 TIMES DAILY
Status: ON HOLD | COMMUNITY
End: 2020-01-01

## 2020-01-01 RX ORDER — HYDRALAZINE HYDROCHLORIDE 20 MG/ML
5 INJECTION INTRAMUSCULAR; INTRAVENOUS EVERY 4 HOURS PRN
Status: DISCONTINUED | OUTPATIENT
Start: 2020-01-01 | End: 2020-01-01

## 2020-01-01 RX ORDER — FUROSEMIDE 20 MG
20 TABLET ORAL
Status: DISCONTINUED | OUTPATIENT
Start: 2020-01-01 | End: 2020-01-01

## 2020-01-01 RX ORDER — FINASTERIDE 5 MG/1
5 TABLET, FILM COATED ORAL DAILY
Status: ON HOLD | COMMUNITY
Start: 2020-01-01 | End: 2020-01-01

## 2020-01-01 RX ORDER — POTASSIUM CHLORIDE 1500 MG/1
20 TABLET, EXTENDED RELEASE ORAL DAILY
COMMUNITY

## 2020-01-01 RX ORDER — FUROSEMIDE 40 MG
40 TABLET ORAL
Status: DISCONTINUED | OUTPATIENT
Start: 2020-01-01 | End: 2020-01-01 | Stop reason: HOSPADM

## 2020-01-01 RX ADMIN — DOXYCYCLINE HYCLATE 100 MG: 100 TABLET, COATED ORAL at 19:59

## 2020-01-01 RX ADMIN — ASPIRIN 162 MG: 81 TABLET, COATED ORAL at 08:51

## 2020-01-01 RX ADMIN — MIDODRINE HYDROCHLORIDE 12.5 MG: 5 TABLET ORAL at 07:47

## 2020-01-01 RX ADMIN — CLOPIDOGREL BISULFATE 75 MG: 75 TABLET, FILM COATED ORAL at 08:39

## 2020-01-01 RX ADMIN — UMECLIDINIUM 1 PUFF: 62.5 AEROSOL, POWDER ORAL at 09:13

## 2020-01-01 RX ADMIN — BUDESONIDE AND FORMOTEROL FUMARATE DIHYDRATE 2 PUFF: 160; 4.5 AEROSOL RESPIRATORY (INHALATION) at 08:42

## 2020-01-01 RX ADMIN — DOXYCYCLINE HYCLATE 100 MG: 100 TABLET, COATED ORAL at 19:51

## 2020-01-01 RX ADMIN — CLOPIDOGREL BISULFATE 75 MG: 75 TABLET, FILM COATED ORAL at 09:08

## 2020-01-01 RX ADMIN — ATORVASTATIN CALCIUM 80 MG: 40 TABLET, FILM COATED ORAL at 21:16

## 2020-01-01 RX ADMIN — QUETIAPINE FUMARATE 25 MG: 25 TABLET ORAL at 21:18

## 2020-01-01 RX ADMIN — SODIUM CHLORIDE 500 ML: 9 INJECTION, SOLUTION INTRAVENOUS at 10:39

## 2020-01-01 RX ADMIN — DOXYCYCLINE HYCLATE 100 MG: 100 TABLET, COATED ORAL at 11:20

## 2020-01-01 RX ADMIN — MIDODRINE HYDROCHLORIDE 12.5 MG: 5 TABLET ORAL at 08:49

## 2020-01-01 RX ADMIN — TAZOBACTAM SODIUM AND PIPERACILLIN SODIUM 3.38 G: 375; 3 INJECTION, SOLUTION INTRAVENOUS at 23:47

## 2020-01-01 RX ADMIN — POTASSIUM CHLORIDE 20 MEQ: 750 CAPSULE, EXTENDED RELEASE ORAL at 09:07

## 2020-01-01 RX ADMIN — MIDODRINE HYDROCHLORIDE 12.5 MG: 5 TABLET ORAL at 13:48

## 2020-01-01 RX ADMIN — BUDESONIDE AND FORMOTEROL FUMARATE DIHYDRATE 2 PUFF: 160; 4.5 AEROSOL RESPIRATORY (INHALATION) at 08:02

## 2020-01-01 RX ADMIN — UMECLIDINIUM 1 PUFF: 62.5 AEROSOL, POWDER ORAL at 09:07

## 2020-01-01 RX ADMIN — TAMSULOSIN HYDROCHLORIDE 0.4 MG: 0.4 CAPSULE ORAL at 08:38

## 2020-01-01 RX ADMIN — FUROSEMIDE 20 MG: 20 TABLET ORAL at 09:09

## 2020-01-01 RX ADMIN — ASPIRIN 162 MG: 81 TABLET, COATED ORAL at 09:51

## 2020-01-01 RX ADMIN — MELATONIN 3 MG: 3 TAB ORAL at 21:47

## 2020-01-01 RX ADMIN — FLUDROCORTISONE ACETATE 100 MCG: 0.1 TABLET ORAL at 09:09

## 2020-01-01 RX ADMIN — POTASSIUM CHLORIDE 20 MEQ: 750 CAPSULE, EXTENDED RELEASE ORAL at 08:51

## 2020-01-01 RX ADMIN — CLOPIDOGREL BISULFATE 75 MG: 75 TABLET, FILM COATED ORAL at 09:59

## 2020-01-01 RX ADMIN — ATORVASTATIN CALCIUM 80 MG: 40 TABLET, FILM COATED ORAL at 19:36

## 2020-01-01 RX ADMIN — MIDODRINE HYDROCHLORIDE 12.5 MG: 5 TABLET ORAL at 17:59

## 2020-01-01 RX ADMIN — SENNOSIDES 2 TABLET: 8.6 TABLET, FILM COATED ORAL at 20:00

## 2020-01-01 RX ADMIN — CLOPIDOGREL BISULFATE 75 MG: 75 TABLET, FILM COATED ORAL at 09:01

## 2020-01-01 RX ADMIN — POTASSIUM CHLORIDE 20 MEQ: 750 CAPSULE, EXTENDED RELEASE ORAL at 09:06

## 2020-01-01 RX ADMIN — BUDESONIDE AND FORMOTEROL FUMARATE DIHYDRATE 2 PUFF: 160; 4.5 AEROSOL RESPIRATORY (INHALATION) at 21:56

## 2020-01-01 RX ADMIN — FUROSEMIDE 40 MG: 10 INJECTION, SOLUTION INTRAMUSCULAR; INTRAVENOUS at 16:05

## 2020-01-01 RX ADMIN — ASPIRIN 162 MG: 81 TABLET, COATED ORAL at 09:56

## 2020-01-01 RX ADMIN — POTASSIUM CHLORIDE 20 MEQ: 750 CAPSULE, EXTENDED RELEASE ORAL at 09:02

## 2020-01-01 RX ADMIN — POTASSIUM CHLORIDE 20 MEQ: 750 CAPSULE, EXTENDED RELEASE ORAL at 08:49

## 2020-01-01 RX ADMIN — ASPIRIN 162 MG: 81 TABLET, COATED ORAL at 09:08

## 2020-01-01 RX ADMIN — MIDODRINE HYDROCHLORIDE 12.5 MG: 5 TABLET ORAL at 13:22

## 2020-01-01 RX ADMIN — FUROSEMIDE 20 MG: 10 INJECTION, SOLUTION INTRAMUSCULAR; INTRAVENOUS at 11:20

## 2020-01-01 RX ADMIN — SODIUM CHLORIDE 1000 ML: 9 INJECTION, SOLUTION INTRAVENOUS at 12:05

## 2020-01-01 RX ADMIN — HYDROCORTISONE SODIUM SUCCINATE 100 MG: 100 INJECTION, POWDER, FOR SOLUTION INTRAMUSCULAR; INTRAVENOUS at 14:20

## 2020-01-01 RX ADMIN — FLUTICASONE FUROATE AND VILANTEROL TRIFENATATE 1 PUFF: 200; 25 POWDER RESPIRATORY (INHALATION) at 09:13

## 2020-01-01 RX ADMIN — FLUTICASONE FUROATE AND VILANTEROL TRIFENATATE 1 PUFF: 200; 25 POWDER RESPIRATORY (INHALATION) at 08:39

## 2020-01-01 RX ADMIN — FLUTICASONE FUROATE AND VILANTEROL TRIFENATATE 1 PUFF: 200; 25 POWDER RESPIRATORY (INHALATION) at 08:50

## 2020-01-01 RX ADMIN — MIDODRINE HYDROCHLORIDE 12.5 MG: 5 TABLET ORAL at 19:08

## 2020-01-01 RX ADMIN — FINASTERIDE 5 MG: 5 TABLET, FILM COATED ORAL at 09:41

## 2020-01-01 RX ADMIN — MIDODRINE HYDROCHLORIDE 12.5 MG: 5 TABLET ORAL at 12:58

## 2020-01-01 RX ADMIN — METHYLPREDNISOLONE SODIUM SUCCINATE 125 MG: 125 INJECTION, POWDER, FOR SOLUTION INTRAMUSCULAR; INTRAVENOUS at 04:39

## 2020-01-01 RX ADMIN — PANTOPRAZOLE SODIUM 20 MG: 20 TABLET, DELAYED RELEASE ORAL at 07:59

## 2020-01-01 RX ADMIN — MIDODRINE HYDROCHLORIDE 12.5 MG: 5 TABLET ORAL at 18:02

## 2020-01-01 RX ADMIN — UMECLIDINIUM 1 PUFF: 62.5 AEROSOL, POWDER ORAL at 08:48

## 2020-01-01 RX ADMIN — FUROSEMIDE 40 MG: 40 TABLET ORAL at 16:25

## 2020-01-01 RX ADMIN — METOPROLOL SUCCINATE 12.5 MG: 25 TABLET, EXTENDED RELEASE ORAL at 07:51

## 2020-01-01 RX ADMIN — POTASSIUM CHLORIDE 20 MEQ: 750 CAPSULE, EXTENDED RELEASE ORAL at 12:58

## 2020-01-01 RX ADMIN — POTASSIUM CHLORIDE 20 MEQ: 750 CAPSULE, EXTENDED RELEASE ORAL at 08:39

## 2020-01-01 RX ADMIN — BUDESONIDE AND FORMOTEROL FUMARATE DIHYDRATE 2 PUFF: 160; 4.5 AEROSOL RESPIRATORY (INHALATION) at 09:43

## 2020-01-01 RX ADMIN — UMECLIDINIUM 1 PUFF: 62.5 AEROSOL, POWDER ORAL at 10:14

## 2020-01-01 RX ADMIN — ASPIRIN 162 MG: 81 TABLET, COATED ORAL at 09:00

## 2020-01-01 RX ADMIN — ATORVASTATIN CALCIUM 80 MG: 40 TABLET, FILM COATED ORAL at 21:46

## 2020-01-01 RX ADMIN — MIDODRINE HYDROCHLORIDE 12.5 MG: 5 TABLET ORAL at 09:42

## 2020-01-01 RX ADMIN — FLUDROCORTISONE ACETATE 200 MCG: 0.1 TABLET ORAL at 08:51

## 2020-01-01 RX ADMIN — HEPARIN SODIUM 1100 UNITS/HR: 10000 INJECTION, SOLUTION INTRAVENOUS at 10:54

## 2020-01-01 RX ADMIN — METOPROLOL SUCCINATE 12.5 MG: 25 TABLET, EXTENDED RELEASE ORAL at 08:33

## 2020-01-01 RX ADMIN — MIDODRINE HYDROCHLORIDE 12.5 MG: 5 TABLET ORAL at 07:38

## 2020-01-01 RX ADMIN — QUETIAPINE FUMARATE 25 MG: 25 TABLET ORAL at 21:03

## 2020-01-01 RX ADMIN — SENNOSIDES 2 TABLET: 8.6 TABLET, FILM COATED ORAL at 20:10

## 2020-01-01 RX ADMIN — CLOPIDOGREL BISULFATE 75 MG: 75 TABLET, FILM COATED ORAL at 09:07

## 2020-01-01 RX ADMIN — ASPIRIN 162 MG: 81 TABLET, COATED ORAL at 09:03

## 2020-01-01 RX ADMIN — FLUTICASONE FUROATE AND VILANTEROL TRIFENATATE 1 PUFF: 200; 25 POWDER RESPIRATORY (INHALATION) at 12:01

## 2020-01-01 RX ADMIN — SODIUM CHLORIDE: 9 INJECTION, SOLUTION INTRAVENOUS at 15:52

## 2020-01-01 RX ADMIN — FLUDROCORTISONE ACETATE 200 MCG: 0.1 TABLET ORAL at 09:02

## 2020-01-01 RX ADMIN — PANTOPRAZOLE SODIUM 20 MG: 20 TABLET, DELAYED RELEASE ORAL at 07:43

## 2020-01-01 RX ADMIN — TAZOBACTAM SODIUM AND PIPERACILLIN SODIUM 3.38 G: 375; 3 INJECTION, SOLUTION INTRAVENOUS at 12:39

## 2020-01-01 RX ADMIN — DOXYCYCLINE HYCLATE 100 MG: 100 TABLET, COATED ORAL at 09:02

## 2020-01-01 RX ADMIN — HYDROCORTISONE SODIUM SUCCINATE 100 MG: 100 INJECTION, POWDER, FOR SOLUTION INTRAMUSCULAR; INTRAVENOUS at 22:05

## 2020-01-01 RX ADMIN — FLUTICASONE FUROATE AND VILANTEROL TRIFENATATE 1 PUFF: 200; 25 POWDER RESPIRATORY (INHALATION) at 09:07

## 2020-01-01 RX ADMIN — CLOPIDOGREL BISULFATE 75 MG: 75 TABLET, FILM COATED ORAL at 09:00

## 2020-01-01 RX ADMIN — PANTOPRAZOLE SODIUM 20 MG: 20 TABLET, DELAYED RELEASE ORAL at 07:41

## 2020-01-01 RX ADMIN — BUDESONIDE AND FORMOTEROL FUMARATE DIHYDRATE 2 PUFF: 160; 4.5 AEROSOL RESPIRATORY (INHALATION) at 21:03

## 2020-01-01 RX ADMIN — ATORVASTATIN CALCIUM 80 MG: 40 TABLET, FILM COATED ORAL at 21:18

## 2020-01-01 RX ADMIN — MIDODRINE HYDROCHLORIDE 12.5 MG: 5 TABLET ORAL at 07:41

## 2020-01-01 RX ADMIN — MELATONIN 3 MG: 3 TAB ORAL at 22:05

## 2020-01-01 RX ADMIN — ASPIRIN 162 MG: 81 TABLET, COATED ORAL at 07:51

## 2020-01-01 RX ADMIN — POTASSIUM CHLORIDE 20 MEQ: 750 CAPSULE, EXTENDED RELEASE ORAL at 09:58

## 2020-01-01 RX ADMIN — UMECLIDINIUM 1 PUFF: 62.5 AEROSOL, POWDER ORAL at 09:49

## 2020-01-01 RX ADMIN — ATORVASTATIN CALCIUM 80 MG: 40 TABLET, FILM COATED ORAL at 20:00

## 2020-01-01 RX ADMIN — SODIUM CHLORIDE 500 ML: 9 INJECTION, SOLUTION INTRAVENOUS at 09:46

## 2020-01-01 RX ADMIN — MIDODRINE HYDROCHLORIDE 12.5 MG: 5 TABLET ORAL at 18:12

## 2020-01-01 RX ADMIN — IPRATROPIUM BROMIDE AND ALBUTEROL SULFATE: .5; 3 SOLUTION RESPIRATORY (INHALATION) at 04:14

## 2020-01-01 RX ADMIN — ASPIRIN 162 MG: 81 TABLET, COATED ORAL at 08:49

## 2020-01-01 RX ADMIN — FUROSEMIDE 40 MG: 10 INJECTION, SOLUTION INTRAMUSCULAR; INTRAVENOUS at 13:54

## 2020-01-01 RX ADMIN — ATORVASTATIN CALCIUM 80 MG: 40 TABLET, FILM COATED ORAL at 21:32

## 2020-01-01 RX ADMIN — FLUDROCORTISONE ACETATE 100 MCG: 0.1 TABLET ORAL at 10:02

## 2020-01-01 RX ADMIN — PANTOPRAZOLE SODIUM 20 MG: 20 TABLET, DELAYED RELEASE ORAL at 06:27

## 2020-01-01 RX ADMIN — METOPROLOL SUCCINATE 12.5 MG: 25 TABLET, EXTENDED RELEASE ORAL at 09:42

## 2020-01-01 RX ADMIN — TAZOBACTAM SODIUM AND PIPERACILLIN SODIUM 3.38 G: 375; 3 INJECTION, SOLUTION INTRAVENOUS at 05:49

## 2020-01-01 RX ADMIN — POTASSIUM CHLORIDE 20 MEQ: 750 CAPSULE, EXTENDED RELEASE ORAL at 14:05

## 2020-01-01 RX ADMIN — FUROSEMIDE 40 MG: 40 TABLET ORAL at 10:05

## 2020-01-01 RX ADMIN — MIDODRINE HYDROCHLORIDE 12.5 MG: 5 TABLET ORAL at 13:57

## 2020-01-01 RX ADMIN — POTASSIUM CHLORIDE 20 MEQ: 750 CAPSULE, EXTENDED RELEASE ORAL at 17:58

## 2020-01-01 RX ADMIN — MELATONIN 3 MG: 3 TAB ORAL at 21:10

## 2020-01-01 RX ADMIN — MIDODRINE HYDROCHLORIDE 12.5 MG: 5 TABLET ORAL at 13:03

## 2020-01-01 RX ADMIN — HYDROCORTISONE SODIUM SUCCINATE 100 MG: 100 INJECTION, POWDER, FOR SOLUTION INTRAMUSCULAR; INTRAVENOUS at 13:57

## 2020-01-01 RX ADMIN — MIDODRINE HYDROCHLORIDE 12.5 MG: 5 TABLET ORAL at 07:53

## 2020-01-01 RX ADMIN — MIDODRINE HYDROCHLORIDE 12.5 MG: 5 TABLET ORAL at 13:53

## 2020-01-01 RX ADMIN — MELATONIN 3 MG: 3 TAB ORAL at 21:13

## 2020-01-01 RX ADMIN — FLUTICASONE FUROATE AND VILANTEROL TRIFENATATE 1 PUFF: 200; 25 POWDER RESPIRATORY (INHALATION) at 11:15

## 2020-01-01 RX ADMIN — SODIUM CHLORIDE: 9 INJECTION, SOLUTION INTRAVENOUS at 08:45

## 2020-01-01 RX ADMIN — FUROSEMIDE 40 MG: 10 INJECTION, SOLUTION INTRAMUSCULAR; INTRAVENOUS at 05:22

## 2020-01-01 RX ADMIN — PANTOPRAZOLE SODIUM 20 MG: 20 TABLET, DELAYED RELEASE ORAL at 08:51

## 2020-01-01 RX ADMIN — METOPROLOL SUCCINATE 12.5 MG: 25 TABLET, EXTENDED RELEASE ORAL at 21:32

## 2020-01-01 RX ADMIN — MIDODRINE HYDROCHLORIDE 12.5 MG: 5 TABLET ORAL at 09:09

## 2020-01-01 RX ADMIN — PANTOPRAZOLE SODIUM 20 MG: 20 TABLET, DELAYED RELEASE ORAL at 06:15

## 2020-01-01 RX ADMIN — ATORVASTATIN CALCIUM 80 MG: 40 TABLET, FILM COATED ORAL at 21:44

## 2020-01-01 RX ADMIN — SODIUM CHLORIDE 500 ML: 9 INJECTION, SOLUTION INTRAVENOUS at 14:23

## 2020-01-01 RX ADMIN — MELATONIN 3 MG: 3 TAB ORAL at 20:00

## 2020-01-01 RX ADMIN — ASPIRIN 162 MG: 81 TABLET, COATED ORAL at 10:02

## 2020-01-01 RX ADMIN — MIDODRINE HYDROCHLORIDE 12.5 MG: 5 TABLET ORAL at 18:32

## 2020-01-01 RX ADMIN — QUETIAPINE FUMARATE 25 MG: 25 TABLET ORAL at 21:32

## 2020-01-01 RX ADMIN — MIDODRINE HYDROCHLORIDE 12.5 MG: 5 TABLET ORAL at 14:05

## 2020-01-01 RX ADMIN — FUROSEMIDE 20 MG: 20 TABLET ORAL at 17:11

## 2020-01-01 RX ADMIN — UMECLIDINIUM 1 PUFF: 62.5 AEROSOL, POWDER ORAL at 08:43

## 2020-01-01 RX ADMIN — ATORVASTATIN CALCIUM 80 MG: 40 TABLET, FILM COATED ORAL at 21:03

## 2020-01-01 RX ADMIN — DOXYCYCLINE HYCLATE 100 MG: 100 TABLET, COATED ORAL at 19:58

## 2020-01-01 RX ADMIN — FLUTICASONE FUROATE AND VILANTEROL TRIFENATATE 1 PUFF: 200; 25 POWDER RESPIRATORY (INHALATION) at 10:13

## 2020-01-01 RX ADMIN — IOPAMIDOL 50 ML: 755 INJECTION, SOLUTION INTRAVENOUS at 11:18

## 2020-01-01 RX ADMIN — MIDODRINE HYDROCHLORIDE 12.5 MG: 5 TABLET ORAL at 08:01

## 2020-01-01 RX ADMIN — FUROSEMIDE 40 MG: 10 INJECTION, SOLUTION INTRAMUSCULAR; INTRAVENOUS at 21:10

## 2020-01-01 RX ADMIN — QUETIAPINE FUMARATE 25 MG: 25 TABLET ORAL at 21:47

## 2020-01-01 RX ADMIN — POTASSIUM CHLORIDE 20 MEQ: 750 CAPSULE, EXTENDED RELEASE ORAL at 09:51

## 2020-01-01 RX ADMIN — FINASTERIDE 5 MG: 5 TABLET, FILM COATED ORAL at 08:33

## 2020-01-01 RX ADMIN — COSYNTROPIN 250 MCG: 0.25 INJECTION, POWDER, LYOPHILIZED, FOR SOLUTION INTRAMUSCULAR; INTRAVENOUS at 17:05

## 2020-01-01 RX ADMIN — PANTOPRAZOLE SODIUM 20 MG: 20 TABLET, DELAYED RELEASE ORAL at 09:07

## 2020-01-01 RX ADMIN — CLOPIDOGREL BISULFATE 75 MG: 75 TABLET, FILM COATED ORAL at 07:52

## 2020-01-01 RX ADMIN — FLUDROCORTISONE ACETATE 100 MCG: 0.1 TABLET ORAL at 11:50

## 2020-01-01 RX ADMIN — SENNOSIDES 2 TABLET: 8.6 TABLET, FILM COATED ORAL at 09:01

## 2020-01-01 RX ADMIN — POTASSIUM CHLORIDE 20 MEQ: 750 CAPSULE, EXTENDED RELEASE ORAL at 09:00

## 2020-01-01 RX ADMIN — POTASSIUM CHLORIDE 20 MEQ: 750 CAPSULE, EXTENDED RELEASE ORAL at 08:32

## 2020-01-01 RX ADMIN — ATORVASTATIN CALCIUM 80 MG: 40 TABLET, FILM COATED ORAL at 22:05

## 2020-01-01 RX ADMIN — DEXAMETHASONE SODIUM PHOSPHATE 4 MG: 10 INJECTION INTRAMUSCULAR; INTRAVENOUS at 08:47

## 2020-01-01 RX ADMIN — CLOPIDOGREL BISULFATE 75 MG: 75 TABLET, FILM COATED ORAL at 08:32

## 2020-01-01 RX ADMIN — CLOPIDOGREL BISULFATE 75 MG: 75 TABLET, FILM COATED ORAL at 09:51

## 2020-01-01 RX ADMIN — MIDODRINE HYDROCHLORIDE 10 MG: 5 TABLET ORAL at 09:41

## 2020-01-01 RX ADMIN — FUROSEMIDE 20 MG: 10 INJECTION, SOLUTION INTRAMUSCULAR; INTRAVENOUS at 11:58

## 2020-01-01 RX ADMIN — DOXYCYCLINE HYCLATE 100 MG: 100 TABLET, COATED ORAL at 07:38

## 2020-01-01 RX ADMIN — MELATONIN 3 MG: 3 TAB ORAL at 21:16

## 2020-01-01 RX ADMIN — MIDODRINE HYDROCHLORIDE 12.5 MG: 5 TABLET ORAL at 09:00

## 2020-01-01 RX ADMIN — UMECLIDINIUM 1 PUFF: 62.5 AEROSOL, POWDER ORAL at 08:54

## 2020-01-01 RX ADMIN — CLOPIDOGREL BISULFATE 75 MG: 75 TABLET, FILM COATED ORAL at 10:02

## 2020-01-01 RX ADMIN — TAZOBACTAM SODIUM AND PIPERACILLIN SODIUM 3.38 G: 375; 3 INJECTION, SOLUTION INTRAVENOUS at 18:25

## 2020-01-01 RX ADMIN — DOXYCYCLINE HYCLATE 100 MG: 100 TABLET, COATED ORAL at 07:41

## 2020-01-01 RX ADMIN — CLOPIDOGREL BISULFATE 75 MG: 75 TABLET, FILM COATED ORAL at 09:57

## 2020-01-01 RX ADMIN — FUROSEMIDE 40 MG: 40 TABLET ORAL at 09:00

## 2020-01-01 RX ADMIN — MIDODRINE HYDROCHLORIDE 12.5 MG: 5 TABLET ORAL at 17:23

## 2020-01-01 RX ADMIN — ATORVASTATIN CALCIUM 80 MG: 40 TABLET, FILM COATED ORAL at 21:10

## 2020-01-01 RX ADMIN — ALBUMIN HUMAN 75 G: 0.25 SOLUTION INTRAVENOUS at 11:51

## 2020-01-01 RX ADMIN — SENNOSIDES 2 TABLET: 8.6 TABLET, FILM COATED ORAL at 22:05

## 2020-01-01 RX ADMIN — MIDODRINE HYDROCHLORIDE 12.5 MG: 5 TABLET ORAL at 08:51

## 2020-01-01 RX ADMIN — CLOPIDOGREL BISULFATE 75 MG: 75 TABLET, FILM COATED ORAL at 09:09

## 2020-01-01 RX ADMIN — ASPIRIN 162 MG: 81 TABLET, COATED ORAL at 08:32

## 2020-01-01 RX ADMIN — TAZOBACTAM SODIUM AND PIPERACILLIN SODIUM 4.5 G: 500; 4 INJECTION, SOLUTION INTRAVENOUS at 05:10

## 2020-01-01 RX ADMIN — FUROSEMIDE 20 MG: 20 TABLET ORAL at 07:41

## 2020-01-01 RX ADMIN — CLOPIDOGREL BISULFATE 75 MG: 75 TABLET, FILM COATED ORAL at 08:51

## 2020-01-01 RX ADMIN — SENNOSIDES 2 TABLET: 8.6 TABLET, FILM COATED ORAL at 19:36

## 2020-01-01 RX ADMIN — CLOPIDOGREL BISULFATE 75 MG: 75 TABLET, FILM COATED ORAL at 08:49

## 2020-01-01 RX ADMIN — BUDESONIDE AND FORMOTEROL FUMARATE DIHYDRATE 2 PUFF: 160; 4.5 AEROSOL RESPIRATORY (INHALATION) at 21:04

## 2020-01-01 RX ADMIN — PANTOPRAZOLE SODIUM 20 MG: 20 TABLET, DELAYED RELEASE ORAL at 07:50

## 2020-01-01 RX ADMIN — ALBUMIN HUMAN 50 G: 0.25 SOLUTION INTRAVENOUS at 10:13

## 2020-01-01 RX ADMIN — IOPAMIDOL 50 ML: 755 INJECTION, SOLUTION INTRAVENOUS at 14:07

## 2020-01-01 RX ADMIN — DOXYCYCLINE HYCLATE 100 MG: 100 TABLET, COATED ORAL at 09:09

## 2020-01-01 RX ADMIN — POTASSIUM CHLORIDE 20 MEQ: 750 CAPSULE, EXTENDED RELEASE ORAL at 09:08

## 2020-01-01 RX ADMIN — ATORVASTATIN CALCIUM 80 MG: 40 TABLET, FILM COATED ORAL at 20:59

## 2020-01-01 RX ADMIN — MIDODRINE HYDROCHLORIDE 10 MG: 5 TABLET ORAL at 21:44

## 2020-01-01 RX ADMIN — MIDODRINE HYDROCHLORIDE 12.5 MG: 5 TABLET ORAL at 13:07

## 2020-01-01 RX ADMIN — POTASSIUM CHLORIDE 20 MEQ: 750 CAPSULE, EXTENDED RELEASE ORAL at 10:02

## 2020-01-01 RX ADMIN — MIDODRINE HYDROCHLORIDE 10 MG: 5 TABLET ORAL at 22:43

## 2020-01-01 RX ADMIN — UMECLIDINIUM 1 PUFF: 62.5 AEROSOL, POWDER ORAL at 08:03

## 2020-01-01 RX ADMIN — HYDROCORTISONE SODIUM SUCCINATE 100 MG: 100 INJECTION, POWDER, FOR SOLUTION INTRAMUSCULAR; INTRAVENOUS at 06:09

## 2020-01-01 RX ADMIN — QUETIAPINE FUMARATE 25 MG: 25 TABLET ORAL at 20:27

## 2020-01-01 RX ADMIN — PANTOPRAZOLE SODIUM 20 MG: 20 TABLET, DELAYED RELEASE ORAL at 07:00

## 2020-01-01 RX ADMIN — MIDODRINE HYDROCHLORIDE 12.5 MG: 5 TABLET ORAL at 14:00

## 2020-01-01 RX ADMIN — SENNOSIDES 2 TABLET: 8.6 TABLET, FILM COATED ORAL at 09:51

## 2020-01-01 RX ADMIN — GUAIFENESIN AND DEXTROMETHORPHAN 5 ML: 100; 10 SYRUP ORAL at 04:39

## 2020-01-01 RX ADMIN — FUROSEMIDE 40 MG: 10 INJECTION, SOLUTION INTRAMUSCULAR; INTRAVENOUS at 04:39

## 2020-01-01 RX ADMIN — ACETAMINOPHEN 650 MG: 325 TABLET, FILM COATED ORAL at 14:38

## 2020-01-01 RX ADMIN — PANTOPRAZOLE SODIUM 20 MG: 20 TABLET, DELAYED RELEASE ORAL at 06:35

## 2020-01-01 RX ADMIN — FLUTICASONE FUROATE AND VILANTEROL TRIFENATATE 1 PUFF: 200; 25 POWDER RESPIRATORY (INHALATION) at 09:49

## 2020-01-01 RX ADMIN — MIDODRINE HYDROCHLORIDE 12.5 MG: 5 TABLET ORAL at 13:34

## 2020-01-01 RX ADMIN — HYDROCORTISONE SODIUM SUCCINATE 100 MG: 100 INJECTION, POWDER, FOR SOLUTION INTRAMUSCULAR; INTRAVENOUS at 06:23

## 2020-01-01 RX ADMIN — PANTOPRAZOLE SODIUM 20 MG: 20 TABLET, DELAYED RELEASE ORAL at 08:33

## 2020-01-01 RX ADMIN — ASPIRIN 81 MG 324 MG: 81 TABLET ORAL at 10:46

## 2020-01-01 RX ADMIN — POTASSIUM CHLORIDE 20 MEQ: 750 CAPSULE, EXTENDED RELEASE ORAL at 07:52

## 2020-01-01 RX ADMIN — BUDESONIDE AND FORMOTEROL FUMARATE DIHYDRATE 2 PUFF: 160; 4.5 AEROSOL RESPIRATORY (INHALATION) at 07:53

## 2020-01-01 RX ADMIN — MIDODRINE HYDROCHLORIDE 12.5 MG: 5 TABLET ORAL at 18:38

## 2020-01-01 RX ADMIN — MIDODRINE HYDROCHLORIDE 12.5 MG: 5 TABLET ORAL at 13:27

## 2020-01-01 RX ADMIN — BUDESONIDE AND FORMOTEROL FUMARATE DIHYDRATE 2 PUFF: 160; 4.5 AEROSOL RESPIRATORY (INHALATION) at 20:31

## 2020-01-01 RX ADMIN — MIDODRINE HYDROCHLORIDE 12.5 MG: 5 TABLET ORAL at 09:08

## 2020-01-01 RX ADMIN — MIDODRINE HYDROCHLORIDE 12.5 MG: 5 TABLET ORAL at 17:15

## 2020-01-01 RX ADMIN — ATORVASTATIN CALCIUM 80 MG: 40 TABLET, FILM COATED ORAL at 20:28

## 2020-01-01 RX ADMIN — DOXYCYCLINE HYCLATE 100 MG: 100 TABLET, COATED ORAL at 19:36

## 2020-01-01 RX ADMIN — MELATONIN 3 MG: 3 TAB ORAL at 19:59

## 2020-01-01 RX ADMIN — UMECLIDINIUM 1 PUFF: 62.5 AEROSOL, POWDER ORAL at 11:15

## 2020-01-01 RX ADMIN — SENNOSIDES 2 TABLET: 8.6 TABLET, FILM COATED ORAL at 10:02

## 2020-01-01 RX ADMIN — MIDODRINE HYDROCHLORIDE 12.5 MG: 5 TABLET ORAL at 17:11

## 2020-01-01 RX ADMIN — ASPIRIN 162 MG: 81 TABLET, COATED ORAL at 09:06

## 2020-01-01 RX ADMIN — FUROSEMIDE 40 MG: 40 TABLET ORAL at 16:58

## 2020-01-01 RX ADMIN — POTASSIUM CHLORIDE 20 MEQ: 750 CAPSULE, EXTENDED RELEASE ORAL at 09:57

## 2020-01-01 RX ADMIN — ASPIRIN 162 MG: 81 TABLET, COATED ORAL at 09:59

## 2020-01-01 RX ADMIN — UMECLIDINIUM 1 PUFF: 62.5 AEROSOL, POWDER ORAL at 08:39

## 2020-01-01 RX ADMIN — MELATONIN 3 MG: 3 TAB ORAL at 19:46

## 2020-01-01 RX ADMIN — FUROSEMIDE 40 MG: 40 TABLET ORAL at 18:02

## 2020-01-01 RX ADMIN — UMECLIDINIUM 1 PUFF: 62.5 AEROSOL, POWDER ORAL at 09:00

## 2020-01-01 RX ADMIN — DOXYCYCLINE HYCLATE 100 MG: 100 TABLET, COATED ORAL at 21:16

## 2020-01-01 RX ADMIN — QUETIAPINE FUMARATE 25 MG: 25 TABLET ORAL at 20:59

## 2020-01-01 RX ADMIN — HYDROCORTISONE SODIUM SUCCINATE 100 MG: 100 INJECTION, POWDER, FOR SOLUTION INTRAMUSCULAR; INTRAVENOUS at 21:46

## 2020-01-01 RX ADMIN — MIDODRINE HYDROCHLORIDE 10 MG: 5 TABLET ORAL at 14:34

## 2020-01-01 RX ADMIN — PANTOPRAZOLE SODIUM 20 MG: 20 TABLET, DELAYED RELEASE ORAL at 08:01

## 2020-01-01 RX ADMIN — FLUDROCORTISONE ACETATE 100 MCG: 0.1 TABLET ORAL at 09:08

## 2020-01-01 RX ADMIN — MIDODRINE HYDROCHLORIDE 12.5 MG: 5 TABLET ORAL at 13:37

## 2020-01-01 RX ADMIN — ASPIRIN 162 MG: 81 TABLET, COATED ORAL at 08:38

## 2020-01-01 RX ADMIN — SENNOSIDES 2 TABLET: 8.6 TABLET, FILM COATED ORAL at 10:01

## 2020-01-01 RX ADMIN — MAGNESIUM SULFATE IN WATER 2 G: 40 INJECTION, SOLUTION INTRAVENOUS at 13:56

## 2020-01-01 RX ADMIN — MIDODRINE HYDROCHLORIDE 12.5 MG: 5 TABLET ORAL at 07:57

## 2020-01-01 RX ADMIN — MIDODRINE HYDROCHLORIDE 12.5 MG: 5 TABLET ORAL at 09:57

## 2020-01-01 RX ADMIN — METOPROLOL SUCCINATE 12.5 MG: 25 TABLET, EXTENDED RELEASE ORAL at 21:43

## 2020-01-01 RX ADMIN — ATORVASTATIN CALCIUM 80 MG: 40 TABLET, FILM COATED ORAL at 19:59

## 2020-01-01 RX ADMIN — MIDODRINE HYDROCHLORIDE 10 MG: 5 TABLET ORAL at 18:03

## 2020-01-01 RX ADMIN — DOXYCYCLINE HYCLATE 100 MG: 100 TABLET, COATED ORAL at 09:57

## 2020-01-01 RX ADMIN — ALBUMIN HUMAN 50 G: 0.25 SOLUTION INTRAVENOUS at 08:39

## 2020-01-01 RX ADMIN — MIDODRINE HYDROCHLORIDE 12.5 MG: 5 TABLET ORAL at 18:03

## 2020-01-01 RX ADMIN — FUROSEMIDE 40 MG: 10 INJECTION, SOLUTION INTRAMUSCULAR; INTRAVENOUS at 06:30

## 2020-01-01 RX ADMIN — SENNOSIDES 2 TABLET: 8.6 TABLET, FILM COATED ORAL at 09:07

## 2020-01-01 RX ADMIN — FLUDROCORTISONE ACETATE 100 MCG: 0.1 TABLET ORAL at 09:02

## 2020-01-01 RX ADMIN — BUDESONIDE AND FORMOTEROL FUMARATE DIHYDRATE 2 PUFF: 160; 4.5 AEROSOL RESPIRATORY (INHALATION) at 21:35

## 2020-01-01 RX ADMIN — FUROSEMIDE 20 MG: 10 INJECTION, SOLUTION INTRAMUSCULAR; INTRAVENOUS at 19:38

## 2020-01-01 RX ADMIN — FLUTICASONE FUROATE AND VILANTEROL TRIFENATATE 1 PUFF: 200; 25 POWDER RESPIRATORY (INHALATION) at 08:54

## 2020-01-01 RX ADMIN — MIDODRINE HYDROCHLORIDE 12.5 MG: 5 TABLET ORAL at 18:51

## 2020-01-01 RX ADMIN — ATORVASTATIN CALCIUM 80 MG: 40 TABLET, FILM COATED ORAL at 21:13

## 2020-01-01 RX ADMIN — PANTOPRAZOLE SODIUM 20 MG: 20 TABLET, DELAYED RELEASE ORAL at 06:43

## 2020-01-01 RX ADMIN — HYDROCORTISONE SODIUM SUCCINATE 100 MG: 100 INJECTION, POWDER, FOR SOLUTION INTRAMUSCULAR; INTRAVENOUS at 06:15

## 2020-01-01 RX ADMIN — MIDODRINE HYDROCHLORIDE 12.5 MG: 5 TABLET ORAL at 17:46

## 2020-01-01 RX ADMIN — PANTOPRAZOLE SODIUM 20 MG: 20 TABLET, DELAYED RELEASE ORAL at 09:57

## 2020-01-01 RX ADMIN — BUDESONIDE AND FORMOTEROL FUMARATE DIHYDRATE 2 PUFF: 160; 4.5 AEROSOL RESPIRATORY (INHALATION) at 20:29

## 2020-01-01 ASSESSMENT — MIFFLIN-ST. JEOR
SCORE: 1701
SCORE: 1680
SCORE: 1679
SCORE: 1661.12
SCORE: 1688
SCORE: 1669
SCORE: 1716
SCORE: 1699
SCORE: 1683
SCORE: 1672

## 2020-01-01 ASSESSMENT — ACTIVITIES OF DAILY LIVING (ADL)
ADLS_ACUITY_SCORE: 18
ADLS_ACUITY_SCORE: 20
ADLS_ACUITY_SCORE: 19
ADLS_ACUITY_SCORE: 20
ADLS_ACUITY_SCORE: 18
ADLS_ACUITY_SCORE: 19
ADLS_ACUITY_SCORE: 19
ADLS_ACUITY_SCORE: 18
ADLS_ACUITY_SCORE: 20
ADLS_ACUITY_SCORE: 18
ADLS_ACUITY_SCORE: 18
ADLS_ACUITY_SCORE: 19
ADLS_ACUITY_SCORE: 18
ADLS_ACUITY_SCORE: 18
ADLS_ACUITY_SCORE: 19
ADLS_ACUITY_SCORE: 18
ADLS_ACUITY_SCORE: 19
ADLS_ACUITY_SCORE: 18
ADLS_ACUITY_SCORE: 19
ADLS_ACUITY_SCORE: 20
ADLS_ACUITY_SCORE: 18
ADLS_ACUITY_SCORE: 19
ADLS_ACUITY_SCORE: 18
ADLS_ACUITY_SCORE: 19
ADLS_ACUITY_SCORE: 18
ADLS_ACUITY_SCORE: 19
ADLS_ACUITY_SCORE: 20
ADLS_ACUITY_SCORE: 18
ADLS_ACUITY_SCORE: 18
ADLS_ACUITY_SCORE: 19
ADLS_ACUITY_SCORE: 18
ADLS_ACUITY_SCORE: 19
ADLS_ACUITY_SCORE: 18
ADLS_ACUITY_SCORE: 18
ADLS_ACUITY_SCORE: 19
ADLS_ACUITY_SCORE: 18

## 2020-01-01 ASSESSMENT — ENCOUNTER SYMPTOMS
FEVER: 0
FEVER: 0
DIFFICULTY URINATING: 0
PSYCHIATRIC NEGATIVE: 1
ABDOMINAL PAIN: 0
ARTHRALGIAS: 0
EYE REDNESS: 0
RESPIRATORY NEGATIVE: 1
CHILLS: 0
CONFUSION: 0
HEMATOLOGIC/LYMPHATIC NEGATIVE: 1
ABDOMINAL PAIN: 0
FEVER: 0
SHORTNESS OF BREATH: 0
ENDOCRINE NEGATIVE: 1
FLANK PAIN: 0
SHORTNESS OF BREATH: 1
NECK PAIN: 0
COLOR CHANGE: 0
NECK STIFFNESS: 0
NECK STIFFNESS: 0
SHORTNESS OF BREATH: 0
CONSTITUTIONAL NEGATIVE: 1
FATIGUE: 1
HEADACHES: 0
MUSCULOSKELETAL NEGATIVE: 1
ALLERGIC/IMMUNOLOGIC NEGATIVE: 1
ABDOMINAL PAIN: 0
EYES NEGATIVE: 1
SHORTNESS OF BREATH: 0
DYSURIA: 0
FEVER: 0
MYALGIAS: 0
GASTROINTESTINAL NEGATIVE: 1
CARDIOVASCULAR NEGATIVE: 1

## 2020-01-01 ASSESSMENT — PAIN SCALES - GENERAL
PAINLEVEL: NO PAIN (0)
PAINLEVEL: NO PAIN (0)

## 2020-01-28 NOTE — PROGRESS NOTES
Subjective Finding:    Chief compalint: Patient presents with:  Back Pain  , Pain Scale: 7/10, Intensity: sharp, Duration: 10 days, Radiating:  right buttock.    Date of injury:     Activities that the pain restricts:   Home/household/hobbies/social activities: yes.  Work duties: no.  Sleep: no.  Makes symptoms better: rest.  Makes symptoms worse: activity.  Have you seen anyone else for the symptoms? No.  Work related: no.  Automobile related injury: no.    Objective and Assessment:    Posture Analysis:   High shoulder: .  Head tilt: .  High iliac crest: right.  Head carriage: neutral.  Thoracic Kyphosis: neutral.  Lumbar Lordosis: forward.    Lumbar Range of Motion: extension decreased, left lateral flexion decreased and right lateral flexion decreased.  Cervical Range of Motion: .  Thoracic Range of Motion: .  Extremity Range of Motion: .    Palpation:   Quad lumb: right, referred pain: no    Segmental dysfunction pre-treatment and treatment area: T5, T11, L5 and PSIS Right.    Assessment post-treatment:  Cervical: .  Thoracic: ROM increased.  Lumbar: ROM increased.    Comments: .      Complicating Factors: .    Procedure(s):  CMT:  62504 Chiropractic manipulative treatment 1-2 regions performed   Thoracic: Diversified, See above for level, Prone and Lumbar: Diversified, See above for level, Side posture    Modalities:  None performed this visit    Therapeutic procedures:  None    Plan:  Treatment plan: PRN.  Instructed patient: stretch as instructed at visit.  Short term goals: increase ROM.  Long term goals: increase ADL.  Prognosis: excellent.

## 2020-01-30 NOTE — PROGRESS NOTES
Subjective Finding:    Chief compalint: Patient presents with:  Back Pain  , Pain Scale: 7/10, Intensity: sharp, Duration: 10 days, Radiating:  right buttock.    Date of injury:     Activities that the pain restricts:   Home/household/hobbies/social activities: yes.  Work duties: no.  Sleep: no.  Makes symptoms better: rest.  Makes symptoms worse: activity.  Have you seen anyone else for the symptoms? No.  Work related: no.  Automobile related injury: no.    Objective and Assessment:    Posture Analysis:   High shoulder: .  Head tilt: .  High iliac crest: right.  Head carriage: neutral.  Thoracic Kyphosis: neutral.  Lumbar Lordosis: forward.    Lumbar Range of Motion: extension decreased, left lateral flexion decreased and right lateral flexion decreased.  Cervical Range of Motion: .  Thoracic Range of Motion: .  Extremity Range of Motion: .    Palpation:   Quad lumb: right, referred pain: no    Segmental dysfunction pre-treatment and treatment area: T5, T11, L5 and PSIS Right.    Assessment post-treatment:  Cervical: .  Thoracic: ROM increased.  Lumbar: ROM increased.    Comments: .      Complicating Factors: .    Procedure(s):  CMT:  91896 Chiropractic manipulative treatment 1-2 regions performed   Thoracic: Diversified, See above for level, Prone and Lumbar: Diversified, See above for level, Side posture    Modalities:  None performed this visit    Therapeutic procedures:  None    Plan:  Treatment plan: PRN.  Instructed patient: stretch as instructed at visit.  Short term goals: increase ROM.  Long term goals: increase ADL.  Prognosis: excellent.

## 2020-02-03 NOTE — PROGRESS NOTES
Subjective Finding:    Chief compalint: Patient presents with:  Back Pain  , Pain Scale: 7/10, Intensity: sharp, Duration: 10 days, Radiating:  right buttock.    Date of injury:     Activities that the pain restricts:   Home/household/hobbies/social activities: yes.  Work duties: no.  Sleep: no.  Makes symptoms better: rest.  Makes symptoms worse: activity.  Have you seen anyone else for the symptoms? No.  Work related: no.  Automobile related injury: no.    Objective and Assessment:    Posture Analysis:   High shoulder: .  Head tilt: .  High iliac crest: right.  Head carriage: neutral.  Thoracic Kyphosis: neutral.  Lumbar Lordosis: forward.    Lumbar Range of Motion: extension decreased, left lateral flexion decreased and right lateral flexion decreased.  Cervical Range of Motion: .  Thoracic Range of Motion: .  Extremity Range of Motion: .    Palpation:   Quad lumb: right, referred pain: no    Segmental dysfunction pre-treatment and treatment area: T5, T11, L5 and PSIS Right.    Assessment post-treatment:  Cervical: .  Thoracic: ROM increased.  Lumbar: ROM increased.    Comments: .      Complicating Factors: .    Procedure(s):  CMT:  54871 Chiropractic manipulative treatment 1-2 regions performed   Thoracic: Diversified, See above for level, Prone and Lumbar: Diversified, See above for level, Side posture    Modalities:  None performed this visit    Therapeutic procedures:  None    Plan:  Treatment plan: PRN.  Instructed patient: stretch as instructed at visit.  Short term goals: increase ROM.  Long term goals: increase ADL.  Prognosis: excellent.

## 2020-02-21 NOTE — PROGRESS NOTES
Initial Physical Therapy Evaluations       Name: Lamont Doran MRN# [id number]   Age: 74 year old YOB: 1945     Date of Consultation: February 21, 2020  Primary care provider: Jordin Aleman    Referring Physician: Jordin Aleman MD   Orders: low back pain, bilateral low back pain   Medical Diagnosis: M54.5 Low Back Pain   Onset of Illness/Injury: Large increase in symptoms since 12/19. Chronic hx with large recent increase.     Reason for PT Visit: Patient comes in today due to noticing that he has lost a large amount of strength in bilateral legs in last several months as well as noticing large increase in bilateral back pain.     Patient has had chronic back pain, but increased in severity and frequency since 12/19. Patient notes that he has had a hard time standing up from low surfaces and walking tolerance has decreased greatly.     Patient is now needing a wheelchair ride from spouse to get to cardiac [facility] rehab, whereas a few months ago he could walk that distance without difficulty.     Patient has also started to use SPC or other assistive devices due to increased leg weakness and balance deficits.     Patient recently had fall when his walker at home caught on something on the floor and patient fell forward. Abrasion to L arm, but no serious injury.     Patient notes several month history of increased SOB. Has seen pulmonologist and was diagnosed with COPD, chronic bronchitis and CHF per his report.     Feels generally unwell. Occasional light headedness in last several days. SOB with walking in home and community. Weakness in legs. Falls history.      Has follow up with primary care provider Dr. Aleman on 3/5/20.     Is waiting to receive inhalers in mail. Has not been delivered yet. Did not complete 6 min walk test when at pulmonology appointment.     Prior Level of Function: Large decline in functional activity tolerance in last several months.      Community Support/Living  Environment/Employment History: Retired      Patient/Family Goal: get stronger, has less back pain, have an easier time walking     Fall Screen:   Have you fallen 2 or more times in the last year? Yes  Have you fallen and had an injury in the last year? Yes  Timed up & go: NT  Is patient a fall risk? Yes. Use gait belt for balance challenge activities and in rehab department based on presentation. One of falls that occurred in past year involved fainting per patient report.     Past Medical History:   No past medical history on file.    Past Surgical History:  No past surgical history on file.    Medications:   Current Outpatient Medications   Medication Sig     ASPIRIN PO Take 162 mg by mouth daily      Atorvastatin Calcium (LIPITOR PO) Take 80 mg by mouth daily      Clopidogrel Bisulfate (PLAVIX PO) Take 75 mg by mouth daily     furosemide (LASIX) 20 MG tablet Take 20 mg by mouth daily     METOPROLOL TARTRATE PO Take 12.5 mg by mouth 2 times daily      OMEPRAZOLE PO Take by mouth every morning     potassium aminobenzoate 500 MG CAPS capsule      No current facility-administered medications for this encounter.        Imaging:     Musculoskeletal Findings:     OBJECTIVE  Pain at best: 3/10  Pain with activity: 9/10  Aggravating Factors: walking, reaching, bending  Relieving Factors: Sitting, brace/supports     Gait: unsteady. Relies on SPC. With gait from waiting room to treatment room, patient's color turn ashen and increased SOB noted. Distance approx 100 feet. Vitals assessed noted 85% O2 sat with erecovery taking 2 min to return to 90%. HR in 60s. BP: 122/58 after activity. /56 at rest.    Consistent de-sating with activity noted. Monitored O2 sat throughout evaluation. In seated above 90%, with activity decrease to 85% with patient symptomatic with SOB and occasional light headedness.     Call placed to primary care provider's office due to new presentation of dropping O2 sat. Patient instructed to wait  "in rehab waiting room until call back received. Patient did receive phone call from RN and left clinic with intention to come to ED with any decline over the weekend. Therapist was with a different patient at the time of phone call.      Posture: Moderately stooped posture, slightly leaning to one side.  Sitting Posture: poor  Standing Posture: poor  Correction of posture: same     Range of Motion:  Active Lumbar Spine       Flexion: Limited and painful. Moderate loss of motion.        Extension: Painful,, major loss of motion due to pain and stiffness.        Right sidebend: Moderate loss of motion.        Left sidebend: Moderate loss of motion.      Right Lower Extremity Range of Motion: within normal limits   Left Lower Extremity Range of Motion: within normal limits     Strength:  Right Lower Extremity Strength:  3+/5 hip flexion, 4-/5 Knee extension, 4-/5 Knee flexion: DF. 4-/5 PF 4-5. Hip abduction: 4/5 adduction 4/5  Left Lower Extremity Strength: Grossly 4/5 for all motions.    Functional strength: From chair in treatment room, patient required Mod A to come to stand. In waiting room, he self selects high elevated chairs (designed for post op MOR patients) to make the sit to stand transfers easier. This is a large functional decrease in strength compared to baseline.      Patient's Concordant Sign: Declining functional activity tolerance. SOB. Increased LE weakness.     Due to presentation, primary care office contacted.     Patient denies night pain, Does have suden weight loss of 15# in past three weeks, but he is on \"water pills\" to address edema in bilateral LEs. This is likely water weight that he has lost.     Outcome Measures:   ISIS: 42.22     Goals: STG 2 weeks  STG: Patient will be independent and compliant with HEP with appropriate exercise tolerance.  LT days  LTG 1: Patient will be able to perform sit to stand transfers consistently without limitation from LE weakness.   LTG2: Patient will " be able to ambulate distances of 300+ feet with good activity tolerance without limitation from SOB or LE functional weakness.   LTG 3: Patient will reports decreased back pain allowing performance of daily activities without significant limitation.      Assessment: Concern present over patient's status and presentation today. Advising that patient stay in contact with his primary care and seek Urgent Care if any further decline occurs. Will continue to closely monitor. Patient is in phase IV Cardiac and will be monitored there as well.     Planned Interventions: Therapeutic exercise with emphasis on strengthening and balance training, home program development, manual techniques and therapeutic exercise to decrease back pain, protected positions.     Clinical Impressions:  Criteria for Skilled Therapeutic Intervention Met: Yes  PT Diagnosis: Significant weakness, SOB and poor activity tolerance. Back pain.   Influenced by the following impairments: pain, weakness, SOB  Functional limitations due to impairment: balance deficits, decreased functional activity tolerance  Clinical presentation: Unstable/Unpredictable  Clinical presentation rationale: clinical judgement   Clinical Decision making (complexity): High Complexity  Predicted Duration of Therapy Intervention (days/wks): 90 days  Risks and Benefits of therapy have been explained: Yes  Patient, Family & other staff in agreement with plan of care: Yes  Comments: closely monitor vitals and determine if referral to ED/Urgent care is warranted prior to primary care follow up on 3/5/20.    Cert: 2/21/20-5/21/20    Total Evaluation Time: 45     I certify the need for these services furnished under this plan of treatment and while under my care. (Physician co-signature of this document indicates review and certification of the therapy plan).      _____________________________     __________________________    ____________  Physician's Signature                 Date                Time

## 2020-03-09 NOTE — NURSING NOTE
Patient picked up over night oximeter [id number]. Patient was instructed on use of device. Patient verbalized understanding.     Patient will return device tomorrow by: noon

## 2020-03-10 NOTE — PROGRESS NOTES
Patient returned the oximeter and the data was downloaded and the report sent to Dr. Saab as well as to scan

## 2020-03-13 NOTE — ED PROVIDER NOTES
History     Chief Complaint   Patient presents with     One-sided Weakness     Altered Mental Status     HPI  Lamont Doran is a 74 year old male who presents to the ED via EMS with suspected stroke.  Patient was last known to be well at 9 PM last night.  He woke up this morning feeling weak and dizzy.  The spouse decided to call 911 when patient was sleeping on a chair and she could not wake him up.  When the EMS got on the scene, patient was completely passed around and was not responding to them despite tactile stimulation.  Patient eventually woke up and was loaded on the ambulance and on the way to the emergency department he passed out again and became unresponsive.  This episode lasted for few minutes and by the time they got to the ED patient was awake and alert.  Initial exam by EMS noted dilated left pupil.  By the time they got here patient's pupils were equal and reacting to light and patient was oriented and awake without any neurological deficits.  Patient has no recollection of the events that led to his arrival at the ED.  He denies pains anywhere in the body but is complaining of feeling weak and has been dizzy this morning.    Allergies:  Allergies   Allergen Reactions     No Clinical Screening - See Comments      Peas and liver       Problem List:    There are no active problems to display for this patient.       Past Medical History:    No past medical history on file.    Past Surgical History:    No past surgical history on file.    Family History:    No family history on file.    Social History:  Marital Status:   [2]  Social History     Tobacco Use     Smoking status: Not on file   Substance Use Topics     Alcohol use: Not on file     Drug use: Not on file        Medications:    ASPIRIN PO  Atorvastatin Calcium (LIPITOR PO)  Clopidogrel Bisulfate (PLAVIX PO)  furosemide (LASIX) 20 MG tablet  METOPROLOL TARTRATE PO  OMEPRAZOLE PO  potassium aminobenzoate 500 MG CAPS  capsule          Review of Systems   Constitutional: Positive for fatigue. Negative for fever.   HENT: Negative for congestion.    Eyes: Negative for redness.   Respiratory: Negative for shortness of breath.    Cardiovascular: Negative for chest pain.   Gastrointestinal: Negative for abdominal pain.   Genitourinary: Negative for difficulty urinating.   Musculoskeletal: Negative for arthralgias and neck stiffness.   Skin: Negative for color change.   Neurological: Negative for headaches.   Psychiatric/Behavioral: Negative for confusion.   All other systems reviewed and are negative.      Physical Exam   BP: 142/84  Pulse: 68  Heart Rate: 68  Temp: 96.4  F (35.8  C)  Resp: 18  Weight: 102.9 kg (226 lb 13.7 oz)  SpO2: (!) 89 %      Physical Exam  Vitals signs and nursing note reviewed.   Constitutional:       General: He is not in acute distress.     Appearance: He is well-developed. He is not diaphoretic.   HENT:      Head: Normocephalic and atraumatic.   Eyes:      Extraocular Movements: Extraocular movements intact.      Pupils: Pupils are equal, round, and reactive to light.   Neck:      Musculoskeletal: Normal range of motion and neck supple.   Cardiovascular:      Rate and Rhythm: Normal rate and regular rhythm.      Heart sounds: Normal heart sounds.   Pulmonary:      Effort: Pulmonary effort is normal. No respiratory distress.      Breath sounds: Normal breath sounds. No stridor.   Abdominal:      General: Bowel sounds are normal. There is no distension.      Tenderness: There is no abdominal tenderness.   Musculoskeletal:         General: No tenderness or deformity.   Neurological:      Mental Status: He is alert and oriented to person, place, and time.      GCS: GCS eye subscore is 4. GCS verbal subscore is 5. GCS motor subscore is 6.      Cranial Nerves: No cranial nerve deficit.         ED Course   Patient evaluated upon arrival and code stroke called out.  Discussed with stroke neurologist on-call   Rosa at 2:25 PM.      Procedures       EKG Interpretation:      Interpreted by Silvino Moody MD  Time reviewed: 2:20 PM  Symptoms at time of EKG: Dizziness and weakness  Rhythm: normal sinus   Rate: normal  Axis: normal  Ectopy: none  Conduction: Right bundle branch block  ST Segments/ T Waves: No ST-T wave changes  Q Waves: none  Comparison to prior: No old EKG available    Clinical Impression: Sinus rhythm with right bundle branch block      Results for orders placed or performed during the hospital encounter of 03/13/20 (from the past 24 hour(s))   CT Head w/o Contrast    Narrative    PROCEDURE: CT HEAD W/O CONTRAST     HISTORY: Altered level of consciousness (LOC), unexplained.    COMPARISON: October 2019    TECHNIQUE:  Helical images of the head from the foramen magnum to the  vertex were obtained without contrast.    FINDINGS: There is a small area of encephalomalacia in the right  frontal lobes. An old infarct. There are some white matter low-density  in both hemispheres consistent with small vessel disease. The  ventricular system is normal in size are no shifts of midline  structures brainstem and cerebellum appear normal. Cranial vault is  intact. The paranasal sinuses are clear.  The visualized paranasal  sinuses are clear.      Impression    IMPRESSION: No acute brain abnormality. 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      FRANCHESKA PLEITEZ MD   CBC with platelets differential   Result Value Ref Range    WBC 6.8 4.0 - 11.0 10e9/L    RBC Count 5.17 4.4 - 5.9 10e12/L    Hemoglobin 13.5 13.3 - 17.7 g/dL    Hematocrit 42.9 40.0 - 53.0 %    MCV 83 78 - 100 fl    MCH 26.1 (L) 26.5 - 33.0 pg    MCHC 31.5 31.5 - 36.5 g/dL    RDW 15.9 (H) 10.0 - 15.0 %    Platelet Count 151 150 - 450 10e9/L    Diff Method Automated Method     % Neutrophils 74.0 %    % Lymphocytes 13.1 %    % Monocytes 11.2 %    % Eosinophils 0.9 %    % Basophils 0.4 %    % Immature Granulocytes 0.4 %    Nucleated RBCs 0 0 /100    Absolute  Neutrophil 5.0 1.6 - 8.3 10e9/L    Absolute Lymphocytes 0.9 0.8 - 5.3 10e9/L    Absolute Monocytes 0.8 0.0 - 1.3 10e9/L    Absolute Eosinophils 0.1 0.0 - 0.7 10e9/L    Absolute Basophils 0.0 0.0 - 0.2 10e9/L    Abs Immature Granulocytes 0.0 0 - 0.4 10e9/L    Absolute Nucleated RBC 0.0    Basic metabolic panel   Result Value Ref Range    Sodium 140 133 - 144 mmol/L    Potassium 3.8 3.4 - 5.3 mmol/L    Chloride 110 (H) 94 - 109 mmol/L    Carbon Dioxide 24 20 - 32 mmol/L    Anion Gap 6 3 - 14 mmol/L    Glucose 91 70 - 99 mg/dL    Urea Nitrogen 30 7 - 30 mg/dL    Creatinine 1.29 (H) 0.66 - 1.25 mg/dL    GFR Estimate 53 (L) >60 mL/min/[1.73_m2]    GFR Estimate If Black 62 >60 mL/min/[1.73_m2]    Calcium 8.0 (L) 8.5 - 10.1 mg/dL   INR   Result Value Ref Range    INR 1.12 0.86 - 1.14   Partial thromboplastin time   Result Value Ref Range    PTT 29 22 - 37 sec   Troponin I   Result Value Ref Range    Troponin I ES 0.050 (H) 0.000 - 0.045 ug/L   D dimer quantitative   Result Value Ref Range    D Dimer 1.6 (H) 0.0 - 0.50 ug/ml FEU   Lactic acid whole blood   Result Value Ref Range    Lactic Acid 0.9 0.7 - 2.0 mmol/L   Lipase   Result Value Ref Range    Lipase 73 73 - 393 U/L   CT Chest/Abdomen/Pelvis w Contrast    Narrative    PROCEDURE: CT CHEST/ABDOMEN/PELVIS W CONTRAST 3/13/2020 2:30 PM    HISTORY: Altered mentation with history of intermittent abdominal  thrombi.    COMPARISONS: December 2019    Meds/Dose Given: omnipaque 350, 50ml    TECHNIQUE: CT scan of the chest abdomen and pelvis with IV contrast    FINDINGS: There is interstitial thickening seen in both lungs. This is  remained stable as compared to previous examination in December 2019.  There is a right lung nodule seen on axial image #60 series 7  unchanged in size from previous examination. Some emphysematous  changes are noted in both lungs. This bronchiectasis seen in the lower  lobes bilaterally. There are mildly enlarged mediastinal lymph nodes  these are  stable as compared to previous examination. Degenerative  changes are present in the thoracic spine. Postoperative changes are  seen in the mediastinum. Heavy coronary artery calcifications are  seen. The heart is normal in size.    The liver is free of masses or biliary ductal enlargement. No  calcified gallstones are seen. The spleen and pancreas appear normal.  The adrenal glands are normal. There is a low-density mass in the  lower pole the right kidney with some calcification within the wall. I  would recommend a six-month follow-up of the kidneys to further  evaluate this area. There are some benign-appearing cysts seen in the  right kidney. The calyces are delicate and nondisplaced the ureters  show no evidence of obstruction and follow a normal course the  bladder. The bladder is free of intrinsic or extrinsic abnormality.  The periaortic lymph nodes are normal in caliber. There is a midline  abdominal wall hernia just below the xiphoid containing portions of  the loop of colon. The rectum is distended with feces. There is mild  distention of the remainder of the colon. A femoral-femoral bypass  graft is seen.         Impression    IMPRESSION: Interstitial thickening in both lungs with a right lung  nodule stable from previous examination in December 2019.    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    Low-density mass in the lower pole the left kidney with some  calcification in the wall. Follow-up CT scan of the kidneys in 6  months time is recommended    The rectum is distended with feces and there is mild distention of the  remainder the colon with gas and feces.    FRANCHESKA PLEITEZ MD   CTA Head Neck with Contrast    Narrative    PROCEDURE: CTA  HEAD NECK WITH CONTRAST 3/13/2020 2:47 PM    HISTORY: Ataxia, stroke suspected; Evaluate for  dissection/thromboembolism    COMPARISONS: None.    Meds/Dose Given: Isovue 370, 50 ml    TECHNIQUE: CT angiogram of the neck and brain with  three-dimensional  MIPS reconstructions performed on a separate workstation.    FINDINGS: The brachycephalic artery is widely patent. There is heavy  aspect plaquing at the right carotid bifurcation with a shelf like 70%  stenosis of the proximal internal carotid artery. The right subclavian  artery is widely patent. The origin of the right vertebral artery is  normal. The right vertebral artery is occluded at the approximately C5  level. The left common carotid left carotid bifurcation and left  internal carotid artery appears normal.  There is some atherosclerotic  plaquing at the origin of the left subclavian artery without  significant stenosis. There is some plaquing at the origin of the left  vertebral artery without hemodynamically significant stenosis. The  left vertebral artery is widely patent beyond the origin.    The distal left vertebral artery supplies the basilar artery. The  posterior cerebral arteries are widely patent.. The uterus is aspect  plaquing in the carotid siphons. The supraclinoid internal carotid  anterior and middle cerebral arteries appear normal. Both anterior  cerebral arteries are predominantly supplied via the right horizontal  component of the anterior cerebral artery. No intracranial aneurysms  are seen.    There are no enhancing masses ventricular shifts or extracerebral  collections. Some right frontal encephalomalacia seen secondary to old  infarct.    The muscles of mastication and the parapharyngeal spaces are normal.  The salivary glands are normal. The larynx and upper trachea is  normal. The cervical and upper mediastinal lymph nodes are normal.         Impression    IMPRESSION: Shelf like 70% stenosis with ulcerated plaque at the right  carotid bifurcation.    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    No intracranial stenoses or occlusions or vascular shifts.    FRANCHESKA PLEITEZ MD   MR Brain w/o Contrast    Narrative    MR BRAIN W/O  CONTRAST  3/13/2020 4:12 PM    History: Male, age 74 years, Focal neuro deficit, < 6 hrs, stroke  suspected; Altered level of consciousness (LOC), unexplained    Comparison: CT scan of the brain 3/13/2020    Technique: Sagittal T1, axial T2, T2 FLAIR, diffusion, gradient echo,  ADC mapping, T1 . .    Findings: Motion artifact limits evaluation.  Ventricles and sulci: Mildly prominent.    Gray and white matter: Scattered, nonenhancing white matter lesions  suggesting small vessel disease.    Cerebellopontine angles: Clear    Extra-axial spaces: Clear.    Enhancement: No contrast is given.    Midline structures: Normal in contour.  Globes: Normal.  Orbits: Normal. Intraconal and extraconal fat unremarkable.  Extraocular muscles: Normal.  Paranasal sinuses: Normal.  Mastoid air cells: Normal.  Diffusion: Normal.      Impression    Impression:  Scattered, nonspecific white matter changes suggest small vessel  disease there is no evidence of acute or subacute ischemia.      COLBY REYES MD   Troponin I   Result Value Ref Range    Troponin I ES 0.045 0.000 - 0.045 ug/L       Medications   0.9% sodium chloride BOLUS (0 mLs Intravenous Stopped 3/13/20 1652)   iopamidol (ISOVUE-370) solution 50 mL (50 mLs Intravenous Given 3/13/20 1407)   sodium chloride (PF) 0.9% PF flush 100 mL (100 mLs Intravenous Given 3/13/20 1407)       Assessments & Plan (with Medical Decision Making)   Transient ischemic attack: Patient presented to the ED via EMS after 2 episodes of becoming unresponsive at home and also in the ambulance.  By the time he arrived at the ED was awake alert and fast was negative.  CT scan of the head and CTA head and neck were negative for acute stroke.  Patient remained neurologically stable throughout ED stay.  MRI of the brain done showed scattered nonspecific white matter changes suggestive of small vessel disease but no evidence of acute or subacute ischemia.  Other labs done showed normal lipase, lactic acid,  INR, initial troponin was 0.05 but repeat troponin was 0.045 which is within the normal range.  He did not have any chest pain.  EKG done did not show any acute changes.  Discussed with Dr. Lee stroke neurologist on-call at [facility] who recommended discharge home and evaluation as outpatient to rule out absence seizures.  Patient discharged home and referred to neurologist for EEG.  A referral for echo as outpatient also made though patient states that last echocardiogram he had was within the last 6 months.  Discharged home stable condition on this and return to ED if condition worsens.    I have reviewed the nursing notes.    I have reviewed the findings, diagnosis, plan and need for follow up with the patient.    Discharge Medication List as of 3/13/2020  5:31 PM          Final diagnoses:   TIA (transient ischemic attack)       3/13/2020   [facility]     Silvino Moody MD  03/14/20 0827

## 2020-03-13 NOTE — ED NOTES
Neuro checks done several additional times. No changes from the normal assessments done initially. Patient remains A&O with intact neuros.

## 2020-03-13 NOTE — PROGRESS NOTES
Lamont Doran 74 year old    Returned from MRI  Exam Performed :MRI BRAIN WO  Pushed to Reading Service?: No  Tolerated Procedure : with moderate difficulty  May resume previous diet : Yes  Time Returned to Unit : 16:15  Report Given to : Yamilka    3/13/2020 4:35 PM   Jose Angel Quiros

## 2020-03-13 NOTE — ED AVS SNAPSHOT
[facility]  [address]  [place] [address]  Phone:  [phone]                                    Lamont Doran   MRN: [id number]    Department:  [facility]   Date of Visit:  3/13/2020           After Visit Summary Signature Page    I have received my discharge instructions, and my questions have been answered. I have discussed any challenges I see with this plan with the nurse or doctor.    ..........................................................................................................................................  Patient/Patient Representative Signature      ..........................................................................................................................................  Patient Representative Print Name and Relationship to Patient    ..................................................               ................................................  Date                                   Time    ..........................................................................................................................................  Reviewed by Signature/Title    ...................................................              ..............................................  Date                                               Time          22EPIC Rev 08/18

## 2020-03-13 NOTE — ED NOTES
Bed: ED10a  Expected date: 3/13/20  Expected time:   Means of arrival: Ambulance  Comments:  [facility]

## 2020-03-13 NOTE — ED NOTES
EMS called Code Stroke PTA. EMS report to Dr Trejo and staff at this time. Dr Moody completed bedside neuro exam. Per EMS, patient has had 2 episodes of unresponsiveness today, issues with balance and dizziness. Positive BEFAST. LTKW 2100. Blood glucose en route 113. EMS states they had to carry patient out of his home.

## 2020-04-02 NOTE — ED AVS SNAPSHOT
[facility]: [phone]                                              INTERAGENCY TRANSFER FORM - PHYSICIAN ORDERS   2020                   Hospital Admission Date: 2020  GUNNER DANG   : 1945  Sex: Male         Attending Provider:  (none)   Allergies:  No Clinical Screening - See Comments    Infection:  None   Service:  EMERGENCY ME    Ht:  --   Wt:  102.9 kg (226 lb 13.7 oz)   Admission Wt:  --    BMI:  --   BSA:  --            Patient PCP Information     Provider PCP Type    Jordin Aleman General      ED Clinical Impression     Diagnosis Description Comment Added By Time Added    Generalized muscle weakness [M62.81] Generalized muscle weakness [M62.81]  Devante Lira MD 4/3/2020  4:44 AM    Diarrhea, unspecified type [R19.7] Diarrhea, unspecified type [R19.7]  Devante Lira MD 4/3/2020  4:44 AM    Chronic obstructive pulmonary disease, unspecified COPD type (H) [J44.9] Chronic obstructive pulmonary disease, unspecified COPD type (H) [J44.9]  Devante Lira MD 4/3/2020  4:45 AM      Hospital Problem List as of 4/3/2020    None      Non-hospital Problem List as of 4/3/2020    None      Current Code Status     Date Active Code Status Order ID Comments User Context       Not on file         Medication Review      UNREVIEWED medications. Ask your doctor about these medications       Dose / Directions Comments   ASPIRIN PO      Dose:  162 mg  Take 162 mg by mouth daily  Refills:  0      furosemide 20 MG tablet  Commonly known as:  LASIX      Dose:  20 mg  Take 20 mg by mouth daily  Refills:  0      LIPITOR PO      Dose:  80 mg  Take 80 mg by mouth daily  Refills:  0      METOPROLOL TARTRATE PO      Dose:  12.5 mg  Take 12.5 mg by mouth 2 times daily  Refills:  0      OMEPRAZOLE PO      Take by mouth every morning  Refills:  0      PLAVIX PO      Dose:  75 mg  Take 75 mg by mouth daily  Refills:  0      potassium aminobenzoate 500 MG Caps capsule      Refills:  0               After  "Care     Activity      Activity Level: up as tolerated    Diet      Diet order: Regular  Fluid restriction? NA  Texture: regular  Thicken liquids? No    General info for SNF      Admitting Provider(s): Silvino Moody MD, MD    Admit to Facility: [facility]  Length of Stay Estimate: Short Term Care: Estimated # of Days <30  Condition: Declining  Level of care:skilled   Rehabilitation Potential: Fair  Admission H&P completed: Yes, by PCP/Other provider Dr Lira  Recent Chemotherapy: No  Use Nursing Home Standing Orders: Yes    For questions about these admission orders, please contact:  [address] [address]  Phone: [phone]    Mantoux instructions (Administer two-step Mantoux unless history of TB or positive PPD)      Give two-step Mantoux (PPD) Per Facility Policy: Yes     Mantoux result:  No results found for: PPDREDNESS, PPDINDURATIO    If history of positive PPD and/or Mantoux is contraindicated, obtain (per facility protocol) one of the following tests and notify provider only if there are abnormal results:     1. Past or current chest X-ray (CXR) for \"active TB negative\" status. The CXR needs to have been done within three months of the patient's admission date. Obtain a copy of the CXR report to document the patient's status. If no such report available, obtain a CXR within 72 hours of admission.    2. QuantiFERON-TB Gold (QFT-G) blood test      Statement of Approval (From admission, onward)     Ordered          04/03/20 1225  I have reviewed and agree with all the recommendations and orders detailed in this document.     Approved and electronically signed by:  Silvino Moody MD                     "

## 2020-04-02 NOTE — ED AVS SNAPSHOT
[facility]  [address]  [place] [address]  Phone:  [phone]                                    Lamont Doran   MRN: [id number]    Department:  [facility]   Date of Visit:  4/2/2020           After Visit Summary Signature Page    I have received my discharge instructions, and my questions have been answered. I have discussed any challenges I see with this plan with the nurse or doctor.    ..........................................................................................................................................  Patient/Patient Representative Signature      ..........................................................................................................................................  Patient Representative Print Name and Relationship to Patient    ..................................................               ................................................  Date                                   Time    ..........................................................................................................................................  Reviewed by Signature/Title    ...................................................              ..............................................  Date                                               Time          22EPIC Rev 08/18

## 2020-04-03 NOTE — ED NOTES
Up to bedside commode with SBA1-1.  Pt not able to bear much weight on right leg, limited mobility.  Urine sample colected and some blood noted for on specipan when trying to collect a stool sample,

## 2020-04-03 NOTE — ED PROVIDER NOTES
History     Chief Complaint   Patient presents with     Generalized Weakness     HPI  Lamont Doran is a 74 year old male who is here from home after inability to stand up.  States he was recently admitted at [facility], diagnosed with pneumonia, received antibiotics, inpatient PT OT, was discharged home.  At home lives with his wife, daughter was there today to assist temporarily.  He had a wet bowel movement on his bed, was able to get to the bathroom with his walker, on the toilet was able to stand up, could not be assisted up by his wife who then called 911.  Patient states he has been weak for the past couple weeks but felt better after being discharged but his legs just did not have the strength for him to stand up.  Denies chest pain difficulty breathing, has baseline difficulty breathing but not worse than normal, no trauma.  No headache, no nausea, no vomiting.  Has had some diarrhea since having dinner, states it was chicken pot pie.    Allergies:  Allergies   Allergen Reactions     No Clinical Screening - See Comments      Peas and liver       Problem List:    There are no active problems to display for this patient.       Past Medical History:    No past medical history on file.    Past Surgical History:    No past surgical history on file.    Family History:    No family history on file.    Social History:  Marital Status:   [2]  Social History     Tobacco Use     Smoking status: Not on file   Substance Use Topics     Alcohol use: Not on file     Drug use: Not on file        Medications:    ASPIRIN PO  Atorvastatin Calcium (LIPITOR PO)  Clopidogrel Bisulfate (PLAVIX PO)  furosemide (LASIX) 20 MG tablet  METOPROLOL TARTRATE PO  OMEPRAZOLE PO  potassium aminobenzoate 500 MG CAPS capsule          Review of Systems   Constitutional: Negative for chills and fever.   Genitourinary: Negative for dysuria and flank pain.   All other systems reviewed and are negative.      Physical Exam   BP:  141/71  Heart Rate: 81  Temp: 98.2  F (36.8  C)  Resp: 18  SpO2: 93 %      Physical Exam  Vitals signs and nursing note reviewed.   Constitutional:       Appearance: Normal appearance.   HENT:      Head: Normocephalic and atraumatic.      Nose: Nose normal.   Eyes:      Extraocular Movements: Extraocular movements intact.      Pupils: Pupils are equal, round, and reactive to light.   Neck:      Musculoskeletal: Normal range of motion and neck supple.   Cardiovascular:      Rate and Rhythm: Normal rate and regular rhythm.      Pulses: Normal pulses.   Pulmonary:      Effort: Pulmonary effort is normal. No respiratory distress.      Breath sounds: No wheezing or rales.   Abdominal:      General: Abdomen is flat. There is no distension.   Musculoskeletal: Normal range of motion.         General: No swelling or tenderness.   Skin:     General: Skin is warm and dry.      Capillary Refill: Capillary refill takes less than 2 seconds.   Neurological:      General: No focal deficit present.      Mental Status: He is alert and oriented to person, place, and time. Mental status is at baseline.   Psychiatric:         Mood and Affect: Mood normal.         Behavior: Behavior normal.         ED Course     ED Course as of Apr 03 0445   Fri Apr 03, 2020   0034 CBC with platelets differential     Procedures               EKG Interpretation:      Interpreted by Devante Lira MD  Time reviewed:   Symptoms at time of EKG: None   Rhythm: normal sinus   Rate: Normal  Axis: Right Axis Deviation  Ectopy: none  Conduction: right bundle branch block (complete)  ST Segments/ T Waves: No ST-T wave changes  Q Waves: nonspecific  Comparison to prior: Unchanged    Clinical Impression: right bundle branch block                Critical Care time:  none               Results for orders placed or performed during the hospital encounter of 04/02/20 (from the past 24 hour(s))   Basic metabolic panel   Result Value Ref Range    Sodium 142 133 - 144  mmol/L    Potassium 3.6 3.4 - 5.3 mmol/L    Chloride 108 94 - 109 mmol/L    Carbon Dioxide 26 20 - 32 mmol/L    Anion Gap 8 3 - 14 mmol/L    Glucose 93 70 - 99 mg/dL    Urea Nitrogen 28 7 - 30 mg/dL    Creatinine 1.31 (H) 0.66 - 1.25 mg/dL    GFR Estimate 52 (L) >60 mL/min/[1.73_m2]    GFR Estimate If Black 61 >60 mL/min/[1.73_m2]    Calcium 7.5 (L) 8.5 - 10.1 mg/dL   CBC with platelets differential   Result Value Ref Range    WBC 9.0 4.0 - 11.0 10e9/L    RBC Count 4.17 (L) 4.4 - 5.9 10e12/L    Hemoglobin 10.9 (L) 13.3 - 17.7 g/dL    Hematocrit 34.3 (L) 40.0 - 53.0 %    MCV 82 78 - 100 fl    MCH 26.1 (L) 26.5 - 33.0 pg    MCHC 31.8 31.5 - 36.5 g/dL    RDW 15.9 (H) 10.0 - 15.0 %    Platelet Count 179 150 - 450 10e9/L    Diff Method Manual Differential     % Neutrophils 75.0 %    % Lymphocytes 16.0 %    % Monocytes 4.0 %    % Eosinophils 3.0 %    % Basophils 1.0 %    % Band 1.0 %    Absolute Neutrophil 6.8 1.6 - 8.3 10e9/L    Absolute Lymphocytes 1.4 0.8 - 5.3 10e9/L    Absolute Monocytes 0.4 0.0 - 1.3 10e9/L    Absolute Eosinophils 0.3 0.0 - 0.7 10e9/L    Absolute Basophils 0.1 0.0 - 0.2 10e9/L    Absolute Bands 0.1 0.0 - 0.6 10e9/L    Reactive Lymphs Present     RBC Morphology Consistent with reported results     Platelet Estimate Confirming automated cell count    NT pro BNP   Result Value Ref Range    N-Terminal Pro BNP Inpatient 3,730 (H) 0 - 1,800 pg/mL   Troponin I   Result Value Ref Range    Troponin I ES 0.043 0.000 - 0.045 ug/L   Troponin I   Result Value Ref Range    Troponin I ES 0.052 (H) 0.000 - 0.045 ug/L   CBC with platelets differential   Result Value Ref Range    WBC 9.0 4.0 - 11.0 10e9/L    RBC Count 4.17 (L) 4.4 - 5.9 10e12/L    Hemoglobin 10.8 (L) 13.3 - 17.7 g/dL    Hematocrit 34.5 (L) 40.0 - 53.0 %    MCV 83 78 - 100 fl    MCH 25.9 (L) 26.5 - 33.0 pg    MCHC 31.3 (L) 31.5 - 36.5 g/dL    RDW 16.1 (H) 10.0 - 15.0 %    Platelet Count 165 150 - 450 10e9/L    Diff Method Automated Method     %  Neutrophils 79.2 %    % Lymphocytes 10.9 %    % Monocytes 8.8 %    % Eosinophils 0.6 %    % Basophils 0.2 %    % Immature Granulocytes 0.3 %    Nucleated RBCs 0 0 /100    Absolute Neutrophil 7.1 1.6 - 8.3 10e9/L    Absolute Lymphocytes 1.0 0.8 - 5.3 10e9/L    Absolute Monocytes 0.8 0.0 - 1.3 10e9/L    Absolute Eosinophils 0.1 0.0 - 0.7 10e9/L    Absolute Basophils 0.0 0.0 - 0.2 10e9/L    Abs Immature Granulocytes 0.0 0 - 0.4 10e9/L    Absolute Nucleated RBC 0.0    Blood gas venous and oxyhgb   Result Value Ref Range    Ph Venous 7.48 (H) 7.32 - 7.43 pH    PCO2 Venous 36 (L) 40 - 50 mm Hg    PO2 Venous 36 25 - 47 mm Hg    Bicarbonate Venous 27 21 - 28 mmol/L    FIO2 3l     Oxyhemoglobin Venous 72 %    Base Excess Venous 3.2 mmol/L   Clostridium difficile toxin B PCR    Specimen: Feces   Result Value Ref Range    Specimen Description Feces     C Diff Toxin B PCR Negative NEG^Negative       Medications   0.9% sodium chloride BOLUS (500 mLs Intravenous Not Given 4/3/20 0157)       Assessments & Plan (with Medical Decision Making)     I have reviewed the nursing notes.    I have reviewed the findings, diagnosis, plan and need for follow up with the patient.   74-year-old male here with inability to ambulate.  Has profuse diarrhea, suspected C. difficile given recent antibiotics and hospitalization, however C. difficile test negative.  Too weak to ambulate safely.  Unsafe discharge home.  Will sign outpatient to next doctor in the morning and have social work see patient to see what resources they can provide.  Hemoglobin dropped 3 points from a few weeks ago, had some bright red blood per RN report, however a second CBC had no change.  All first troponin was borderline, second 1 was mildly elevated however delta troponin 0 008, no active chest pain so will not continue chest pain work-up.    I have placed an order for social work to see patient.  The goal for social work is to find patient resources so he can function at  home, this may be a home health aide or at home physical therapy for the patient.  His wife lives there.  She is unable to help lift him and patient just needs some short-term assistance until he is more functional at home.    New Prescriptions    No medications on file       Final diagnoses:   Generalized muscle weakness   Diarrhea, unspecified type   Chronic obstructive pulmonary disease, unspecified COPD type (H)       4/2/2020   [facility]     Devante Lira MD  04/03/20 0445       Devante Lira MD  04/03/20 0704

## 2020-04-03 NOTE — ED NOTES
Call from Lee Ann Ledesma, admissions at [facility].  She is waiting for approval from [facility] on admission.  She will contact me with approval and will transport upon her word.  Will follow    COURTNEY Blood

## 2020-04-03 NOTE — ED NOTES
Pt up to commode started with a large formed soft stool, as we were cleaning pt up he had to sit back down and had a large liquidly stool, after this pt continued to have 2 more liquidly stools.  All the stools were pasty cynthia colored or very odorous.  Pt denies feeling light headed  Checked BP on right arm and is 96/60 pt stated that his right arm is always way lower then the left.  Provider was updated.

## 2020-04-03 NOTE — ED TRIAGE NOTES
Recently discharged from [facility] for COPD chronic bronchitis.  He reported he was there for 2 weeks. Pt has hx of being SOB for the past 4-5 months but not any worse today  Pt comes today as his weakness is worse and couldn't get out of bed. Pt stated he couldn't stand up today because his legs were to weak.  Pt rude to staff and wants his daughter to come back tried to inform pt that there are no visitors allowed at this time, pt upset by this and just wants to leave. Pt stated that he didn't want to come and the EMS made him come.  Pt reports he feels like he is dehydrated but declines IV.  Water given

## 2020-04-03 NOTE — ED NOTES
Face to face report given with opportunity to observe patient.    Report given to Rizwana NOGUEIRA RN   4/3/2020  7:27 AM

## 2020-04-03 NOTE — TELEPHONE ENCOUNTER
Received fax from [employer] in regards to Lamont Doran   :  1945     The following information was received via fax:      1) Resident being admitted to us with weakness- will you assume cares?     2) Resident came to us with orders ASA 162mg daily. Should we keep it this way?       3) Ok for omeprazole 20mg every morning       4) per ER report resident was on 3LPM to keep sats above 90%. He didn't come with them orders. Ok for o2  2-3 LPM to keep sats above 90%?    5) Resident came to us with orders of potassium aminobenzoate 500mg our pharmacy can't get this- any substitution?      Bridgett Jane LPN

## 2020-04-03 NOTE — ED NOTES
Pt transported to  [facility] x 1. 3 bags of belongings brought in from dtr and sent with pt at this time.   discharge on 3lpm NC. VSS.   No questions from pt at  This time

## 2020-04-03 NOTE — TELEPHONE ENCOUNTER
Skye from [facility] called wondering if someone would be able to sign the faxes sent over for this pt. She was wondering if this would be able to get done before the weekend please advise.

## 2020-04-03 NOTE — TELEPHONE ENCOUNTER
Received fax from [employer] in regards to Lamont Doran   :  1945     The following information was received via fax:    Ok for OT and PT eval and treatment for weakness?        (you have never seen this patient)      Bridgett Jane LPN

## 2020-04-03 NOTE — ED NOTES
Call to Jeimy Pichardo, Daughter, [phone].  Stated that pt was recently discharged home from [facility] rehab unit.  Likely needed short term snf and was sent home too soon per her report.  I have contacted [facility] in [place] per family request to pursue placement.   Screening info sent.  Awaiting reply from snf.  Updated [facility] staff.

## 2020-04-06 NOTE — TELEPHONE ENCOUNTER
Received fax from [employer] in regards to Lamont Doran   :  1945     The following information was received via fax:    Resident come to is today and orders state moxifloxacin 400 MG once daily but doesn't state how long for or what for. Previously in [facility] with pneumonia-should we continue antibiotic and for how long?    Liz Dillon MA

## 2020-04-06 NOTE — TELEPHONE ENCOUNTER
Received fax from [employer] in regards to Lamont Doran   :  1945     The following information was received via fax:    Resident has been having lots of loose stools. Okay for Banatrol plus per package instruction once daily?    Liz Dillon MA

## 2020-04-06 NOTE — TELEPHONE ENCOUNTER
Received fax from [employer] in regards to Lamont Doran   :  1945     The following information was received via fax:    Okay for omeprazole 20 MG every day? Medication list update. Please sign pended medication.      Liz Dillon MA

## 2020-04-07 NOTE — TELEPHONE ENCOUNTER
Received fax from [employer] in regards to Lamont Doran   :  1945     The following information was received via fax:    Resident has 3+ edema bilateral loser extremities - ok for wilfrid on in am and off pm?        Bridgett Jane LPN

## 2020-04-13 NOTE — TELEPHONE ENCOUNTER
Received fax from [employer] in regards to Lamont JOSE Harrisjosue   :  1945     The following information was received via fax:    Resident was admitted to facility on 4/3/20 with order for seroquel 25mg as needed at bedtime. Can we please have appropriate psych diagnosis for this.       Bridgett Jane LPN

## 2020-04-13 NOTE — TELEPHONE ENCOUNTER
Received fax from [employer] in regards to Lamont Doran   :  1945     The following information was received via fax:    Patients states he sees blood in his urine- no other symptoms. Any orders?        Bridgett Jane LPN

## 2020-04-14 NOTE — TELEPHONE ENCOUNTER
Received fax from [facility] in regards to Lamont Doran   :  1945     The following information was received via fax:    Ok to discontinue Banatrol order related to refusal?      Bridgett Jane LPN

## 2020-04-15 NOTE — TELEPHONE ENCOUNTER
Received fax from [employer] in regards to Lamont Doran   :  1945     The following information was received via fax:    Please review UA under Care Everywhere and advise.       Bridgett Jane LPN

## 2020-04-20 NOTE — TELEPHONE ENCOUNTER
Received fax from [employer] in regards to Lamont Doran   :  1945     The following information was received via fax:    Resident has +2 pitting edema left leg from the knee down and also has +3 pitting edema to right leg from the knee down. resident has TEDS applied every morning and removed every evening. Currently taking 40 MG of lasix every day. Please advise of any new orders?    Liz Dillon MA

## 2020-04-23 NOTE — TELEPHONE ENCOUNTER
Received fax from [employer] in regards to Lamont Doran   :  1945     The following information was received via fax:    Resident currently has order for Seroquel 25 MG as needed at bedtime. Resident states he would like this scheduled as it helps him to sleep and he was taking it every night at home. May we please change the order from PRN at bedtime to scheduled at bedtime? DX?    Liz Dillon MA

## 2020-04-28 NOTE — TELEPHONE ENCOUNTER
"Received fax from [employer] in regards to Lamont Doran   :  1945     The following information was received via fax:    Resident has had 2 episodes in the last 24 hours where after ambulating he sits and goes into a \"daze\". States he feels dizzy and comes out of it and responds within a minute or so but still feels dizzy. BP 5 minutes following ambulation 84/50 and 10 minutes after ambulation 98/48. Resident has history of TIAs and blood clot in (R) arm. Please Advise!    Ashley LeChevalier LPN            "

## 2020-04-28 NOTE — TELEPHONE ENCOUNTER
Order to increase lasix to 100 mg for 3 days faxed back to facility per Dr. Delaney. Ashley A. Lechevalier, LPN on 4/28/2020 at 10:14 AM

## 2020-04-28 NOTE — TELEPHONE ENCOUNTER
[facility] in regards to Lamont GARCIA Stevenjosue   :  1945     The following information was received via fax:    Resident having dry barking cough, crackles to bilateral bases, 2+ pitting edema. O2 94% on 3 LPM. Is on lasix 60 mg. Any new orders?    Ashley LeChevalier LPN

## 2020-04-29 NOTE — TELEPHONE ENCOUNTER
Facility faxed back current med list as requested. Medications updated in patient chart to be reviewed.    Ashley LeChevalier LPN

## 2020-04-30 NOTE — TELEPHONE ENCOUNTER
Approval faxed back to facility per Dr. Delaney. Ashley A. Lechevalier, LPN on 4/30/2020 at 9:01 AM

## 2020-04-30 NOTE — TELEPHONE ENCOUNTER
"\"Continue to monitor\" statement per Dr. Delaney faxed back to facility. Ashley A. Lechevalier, LPN on 4/30/2020 at 11:04 AM    "

## 2020-04-30 NOTE — TELEPHONE ENCOUNTER
[facility] Lam[facility]   :  1945     The following information was received via fax:    Resident has orders for Proair HFA, Symbicort, and Spiriva. Resident is aware of medication and reasons for its use. Can correctly administer each. Can we have an order for resident to self-administer these medications and to keep at bedside?    Ashley LeChevalier LPN

## 2020-04-30 NOTE — TELEPHONE ENCOUNTER
Medication list was updated in patient chart. Have you had time to review so I can send advisement back to facility??    Ashley LeChevalier LPN

## 2020-05-01 NOTE — TELEPHONE ENCOUNTER
Received fax from [employer] in regards to Lamont Doran   :  1945     The following information was received via fax:    Resident still continues to have hypotensive episodes during therapy with BP dropping low drastically and resident getting dizzy.     NEW ORDERS?  Please review meds- decrease BP meds?    Ashley LeChevalier LPN

## 2020-05-01 NOTE — TELEPHONE ENCOUNTER
Received fax from [employer] in regards to Lamont Doran   :  1945     The following information was received via fax:    Resident continues with hypotensive episodes and orthostatic BP changes. Current orders for metoprolol succinate 25 mg 1/2 (12.5 mg) tab BID. No parameters. Midodrine 5 mg tabs TID. Lasix 100 mg x3 days.     Additional parameters to medications??        Ashley LeChevalier LPN

## 2020-05-04 NOTE — TELEPHONE ENCOUNTER
Received fax from [employer] in regards to Lamont Doran   :  1945     The following information was received via fax:    Received orders to hold metoprolol. How long to hold?    Liz Dillon MA

## 2020-05-04 NOTE — TELEPHONE ENCOUNTER
Received fax from [employer] in regards to Lamont Doran   :  1945     The following information was received via fax:    FYI-Resident had another episode of going unresponsive this morning at 6:50am. Resident was waiting to get off toilet to get ready for the day aid accompanied resident when he all of the sudden went limp. Resident did not have BM. Resident awakened with verbal and physical stimulation within seconds stated he felt dizzy. BP was 60/30 HR 43. BP then yamileth to 108/60 after 15 minutes following episode. Then 132/68 after another 15 minutes following episode. Held proscar and metoprolol this morning. BP rechecked at 11:30 and was 112/50. Resident did have to be transferred off the toilet at 6:50am with PAL lift but returned to normal strength of transferring ETHAN.    Liz Dillon MA

## 2020-05-06 NOTE — TELEPHONE ENCOUNTER
Received fax from [employer] in regards to Laomnt JOSE Stevenjosue   :  1945     The following information was received via fax:    Resident had another unresponsive episode lasting 10 minutes. Please review meds. He does have angiogram scheduled with cardiologist Monday and a preop visit Friday at walk in clinic.  Please advise.  Liz Dillon MA

## 2020-05-06 NOTE — PROGRESS NOTES
[facility] [place]  [address]  [facility] [place] [address]  [phone]  Dept: [phone]    PRE-OP EVALUATION:  Today's date: 2020    Lamont Doran (: 1945) presents for pre-operative evaluation assessment as requested by Dr. Wade.  He requires evaluation and anesthesia risk assessment prior to undergoing surgery/procedure Angiogram .    Proposed Surgery/ Procedure: Angiogram  Date of Surgery/ Procedure: 2020  Time of Surgery/ Procedure: [facility]  Hospital/Surgical Facility:[facility]  Primary Physician: Jordin Aleman  Type of Anesthesia Anticipated: to be determined    Patient has a Health Care Directive or Living Will:  YES but patient states that [place] does not have a copy of it    1. yes- Do you have a history of heart attack, stroke, stent, bypass or surgery on an artery in the head, neck, heart or legs?  2. NO - Do you ever have any pain or discomfort in your chest?  3. yes - Do you have a history of  Heart Failure?  4. yes - Are you troubled by shortness of breath when: walking on the level, up a slight hill or at night? Has copd  5. NO - Do you currently have a cold, bronchitis or other respiratory infection?  6. NO - Do you have a cough, shortness of breath or wheezing?  7. yes - Do you sometimes get pains in the calves of your legs when you walk?  8. NO - Do you or anyone in your family have previous history of blood clots?  9. NO - Do you or does anyone in your family have a serious bleeding problem such as prolonged bleeding following surgeries or cuts?  10. NO - Have you ever had problems with anemia or been told to take iron pills?  11. NO - Have you had any abnormal blood loss such as black, tarry or bloody stools, or abnormal vaginal bleeding?  12. NO - Have you ever had a blood transfusion?  13. NO - Have you or any of your relatives ever had problems with anesthesia?  14. NO - Do you have sleep apnea, excessive snoring or daytime drowsiness?  15. NO - Do  you have any prosthetic heart valves?  16. NO - Do you have prosthetic joints?  17. NO - Is there any chance that you may be pregnant?      HPI:     HPI related to upcoming procedure: Angiogram      CAD - Patient has a longstanding history of moderate-severe CAD. Patient denies recent chest pain or NTG use, denies exercise induced dyspnea or PND. Last  EKG Bifascicular block.     COPD - Patient has a longstanding history of moderate-severe COPD . Patient has been doing well overall noting SOB and on continuous NC O2 2L and continues on medication regimen consisting of inhalers and O2 without adverse reactions or side effects.    HYPERLIPIDEMIA - Patient has a long history of significant Hyperlipidemia requiring medication for treatment with recent fair control. Patient reports no problems or side effects with the medication.     HYPERTENSION - Patient has longstanding history of HTN , currently denies any symptoms referable to elevated blood pressure. Specifically denies chest pain, palpitations, dyspnea, orthopnea, PND or peripheral edema. Blood pressure readings have been in normal range. Current medication regimen is as listed below. Patient denies any side effects of medication.       MEDICAL HISTORY:     Patient Active Problem List    Diagnosis Date Noted     Essential hypertension 05/08/2020     Priority: Medium     Preop general physical exam 05/08/2020     Priority: Medium     Panlobular emphysema (H) 05/08/2020     Priority: Medium     Obstructive chronic bronchitis with exacerbation (H) 05/08/2020     Priority: Medium     History of MI (myocardial infarction) 05/08/2020     Priority: Medium     PVD (posterior vitreous detachment), bilateral 05/08/2020     Priority: Medium      History reviewed. No pertinent past medical history.  History reviewed. No pertinent surgical history.  Current Outpatient Medications   Medication Sig Dispense Refill     ALBUTEROL SULFATE HFA IN Inhale 2 puffs into the lungs every  4 hours       aspirin 81 MG EC tablet Take 81 mg by mouth daily       Atorvastatin Calcium (LIPITOR PO) Take 80 mg by mouth daily        budesonide-formoterol (SYMBICORT) 160-4.5 MCG/ACT Inhaler Inhale 2 puffs into the lungs 2 times daily       Clopidogrel Bisulfate (PLAVIX PO) Take 75 mg by mouth daily       finasteride (PROSCAR) 5 MG tablet Take 5 mg by mouth daily       furosemide (LASIX) 20 MG tablet Take 20 mg by mouth daily Give 3 tablets (60 mg) PO 1 time daily       loperamide (IMODIUM) 2 MG capsule Take 2 mg by mouth every 2 hours as needed for diarrhea        metoprolol succinate ER (TOPROL-XL) 25 MG 24 hr tablet Take 12.5 mg by mouth 2 times daily       midodrine (PROAMATINE) 5 MG tablet Take 10 mg by mouth 3 times daily       omeprazole (PRILOSEC) 20 MG DR capsule Take 1 capsule (20 mg) by mouth daily 90 capsule 1     potassium chloride ER (K-TAB) 20 MEQ CR tablet Take 20 mEq by mouth daily       QUEtiapine (SEROQUEL) 25 MG tablet Take 25 mg by mouth nightly as needed       tamsulosin (FLOMAX) 0.4 MG capsule Take 0.4 mg by mouth daily       tiotropium (SPIRIVA RESPIMAT) 2.5 MCG/ACT inhaler Inhale 2 puffs into the lungs daily       OTC products: None, except as noted above    Allergies   Allergen Reactions     No Clinical Screening - See Comments      Peas and liver      Latex Allergy: NO    Social History     Tobacco Use     Smoking status: Former Smoker     Types: Cigarettes     Last attempt to quit: 1990     Years since quittin.0     Smokeless tobacco: Never Used   Substance Use Topics     Alcohol use: Not Currently     History   Drug Use Unknown       REVIEW OF SYSTEMS:   Constitutional, neuro, ENT, endocrine, pulmonary, cardiac, gastrointestinal, genitourinary, musculoskeletal, integument and psychiatric systems are negative, except as otherwise noted.    EXAM:   /60   Pulse 57   Temp 96.2  F (35.7  C) (Tympanic)   Resp 18   SpO2 96%      GENERAL APPEARANCE: Alert and no distress      EYES: EOMI,  PERRL     HENT: Moderate cerumen in both ears     RESP: lungs clear to auscultation - no rales, rhonchi or wheezes     CV: regular rates and rhythm, normal S1 S2, no S3 or S4 and no murmur, click or rub     ABDOMEN:  soft, nontender, no HSM or masses and bowel sounds normal     MS: +3 LE edema     SKIN: no suspicious lesions or rashes     NEURO: No lateralizing deficits     PSYCH: mentation appears normal. and affect normal/bright    DIAGNOSTICS:   EKG:  Bifascicular block unchanged from previous    Recent Labs   Lab Test 04/03/20  0152 04/02/20  2356 03/13/20  1407  01/31/15  0221   HGB 10.8* 10.9* 13.5   < > 13.7    179 151   < > 169   INR  --   --  1.12  --  1.06   NA  --  142 140   < > 138   POTASSIUM  --  3.6 3.8   < > 3.4   CR  --  1.31* 1.29*   < > 0.79    < > = values in this interval not displayed.      Results for orders placed or performed in visit on 05/08/20   XR CHEST 1 VW (Clinic Performed)     Status: None    Narrative    PROCEDURE: XR CHEST 1 VW 5/8/2020 11:22 AM    HISTORY: Preop general physical exam    COMPARISONS: 4/3/2020.    TECHNIQUE: Single view.    FINDINGS: There has been a median sternotomy. Heart is enlarged with  mild vascular congestion and interstitial edema. There are bilateral  interstitial appearing infiltrates with small amount of fluid in the  left pleural space.         Impression    IMPRESSION: Findings suggest mild congestive heart failure. This may  be somewhat chronic given similar appearance on prior exam.    FRANCI KENDALL MD   Results for orders placed or performed in visit on 05/08/20   CBC with platelets and differential     Status: Abnormal   Result Value Ref Range    WBC 7.6 4.0 - 11.0 10e9/L    RBC Count 4.28 (L) 4.4 - 5.9 10e12/L    Hemoglobin 10.9 (L) 13.3 - 17.7 g/dL    Hematocrit 34.6 (L) 40.0 - 53.0 %    MCV 81 78 - 100 fl    MCH 25.5 (L) 26.5 - 33.0 pg    MCHC 31.5 31.5 - 36.5 g/dL    RDW 16.8 (H) 10.0 - 15.0 %    Platelet Count 162 150  - 450 10e9/L    Diff Method Automated Method     % Neutrophils 77.2 %    % Lymphocytes 11.6 %    % Monocytes 9.1 %    % Eosinophils 1.2 %    % Basophils 0.5 %    % Immature Granulocytes 0.4 %    Nucleated RBCs 0 0 /100    Absolute Neutrophil 5.9 1.6 - 8.3 10e9/L    Absolute Lymphocytes 0.9 0.8 - 5.3 10e9/L    Absolute Monocytes 0.7 0.0 - 1.3 10e9/L    Absolute Eosinophils 0.1 0.0 - 0.7 10e9/L    Absolute Basophils 0.0 0.0 - 0.2 10e9/L    Abs Immature Granulocytes 0.0 0 - 0.4 10e9/L    Absolute Nucleated RBC 0.0    Comprehensive metabolic panel (BMP + Alb, Alk Phos, ALT, AST, Total. Bili, TP)     Status: Abnormal   Result Value Ref Range    Sodium 141 133 - 144 mmol/L    Potassium 3.7 3.4 - 5.3 mmol/L    Chloride 106 94 - 109 mmol/L    Carbon Dioxide 32 20 - 32 mmol/L    Anion Gap 3 3 - 14 mmol/L    Glucose 122 (H) 70 - 99 mg/dL    Urea Nitrogen 25 7 - 30 mg/dL    Creatinine 1.05 0.66 - 1.25 mg/dL    GFR Estimate 68 >60 mL/min/[1.73_m2]    GFR Estimate If Black 79 >60 mL/min/[1.73_m2]    Calcium 8.4 (L) 8.5 - 10.1 mg/dL    Bilirubin Total 0.3 0.2 - 1.3 mg/dL    Albumin 1.5 (L) 3.4 - 5.0 g/dL    Protein Total 5.9 (L) 6.8 - 8.8 g/dL    Alkaline Phosphatase 80 40 - 150 U/L    ALT 12 0 - 70 U/L    AST 16 0 - 45 U/L     PROCEDURE: XR CHEST 1 VW 5/8/2020 11:22 AM     HISTORY: Preop general physical exam     COMPARISONS: 4/3/2020.     TECHNIQUE: Single view.     FINDINGS: There has been a median sternotomy. Heart is enlarged with  mild vascular congestion and interstitial edema. There are bilateral  interstitial appearing infiltrates with small amount of fluid in the  left pleural space.                                                                        IMPRESSION: Findings suggest mild congestive heart failure. This may  be somewhat chronic given similar appearance on prior exam.     FRANCI ENOCH, MD  IMPRESSION:   Reason for surgery/procedure: PVD    The proposed surgical procedure is considered INTERMEDIATE  risk.    REVISED CARDIAC RISK INDEX  The patient has the following serious cardiovascular risks for perioperative complications such as (MI, PE, VFib and 3  AV Block):  No serious cardiac risks  INTERPRETATION: 1 risks: Class II (low risk - 0.9% complication rate)    The patient has the following additional risks for perioperative complications:  No identified additional risks  The ASCVD Risk score (Matt HARO Jr., et al., 2013) failed to calculate for the following reasons:    Cannot find a previous HDL lab    Cannot find a previous total cholesterol lab  Oxygen dependent lung disease      ICD-10-CM    1. Preop general physical exam  Z01.818 CBC with platelets and differential     Comprehensive metabolic panel (BMP + Alb, Alk Phos, ALT, AST, Total. Bili, TP)     EKG 12-lead complete w/read - (Clinic Performed)     XR CHEST 2 VW (Clinic Performed)     COVID-19 Virus (Coronavirus) by PCR Nasopharyngeal swab   2. Panlobular emphysema (H)  J43.1    3. Obstructive chronic bronchitis with exacerbation (H)  J44.1    4. Essential hypertension  I10    5. PVD (posterior vitreous detachment), bilateral  H43.813    6. History of MI (myocardial infarction)  I25.2        RECOMMENDATIONS:     --Consult hospital rounder / IM to assist post-op medical management    --Patient is to take all scheduled medications on the day of surgery EXCEPT for modifications listed below  Lasix and potassium    APPROVAL GIVEN to proceed with proposed procedure, without further diagnostic evaluation.  Patient appears to have chronic HF and bifascicular block on EKG.  He does not endorse any chest pain.   His respiratory status in tenuous at baseline.  His risk in this procedure is not low but I believe he is in a reasonable state to move forward with the procedure.          Signed Electronically by: HC PROVIDER 2    Copy of this evaluation report is provided to requesting physician.    [facility] Preop Guidelines    Revised Cardiac Risk Index

## 2020-05-06 NOTE — TELEPHONE ENCOUNTER
Per Dr Delaney-I have reviewed the meds and there is nothing more I can stop. Needs to see cardiology.  Faxed back to facility.    Liz Dillon MA

## 2020-05-06 NOTE — PATIENT INSTRUCTIONS
Take all medications morning of procedure except for Lasix and potassium      Before Your Surgery      Call your surgeon if there is any change in your health. This includes signs of a cold or flu (such as a sore throat, runny nose, cough, rash or fever).    Do not smoke, drink alcohol or take over the counter medicine (unless your surgeon or primary care doctor tells you to) for the 24 hours before and after surgery.    If you take prescribed drugs: Follow your doctor s orders about which medicines to take and which to stop until after surgery.    Eating and drinking prior to surgery: follow the instructions from your surgeon    Take a shower or bath the night before surgery. Use the soap your surgeon gave you to gently clean your skin. If you do not have soap from your surgeon, use your regular soap. Do not shave or scrub the surgery site.  Wear clean pajamas and have clean sheets on your bed.

## 2020-05-08 PROBLEM — Z01.818 PREOP GENERAL PHYSICAL EXAM: Status: ACTIVE | Noted: 2020-01-01

## 2020-05-08 PROBLEM — I25.2 HISTORY OF MI (MYOCARDIAL INFARCTION): Status: ACTIVE | Noted: 2020-01-01

## 2020-05-08 PROBLEM — J43.1 PANLOBULAR EMPHYSEMA (H): Status: ACTIVE | Noted: 2020-01-01

## 2020-05-08 PROBLEM — H43.813 PVD (POSTERIOR VITREOUS DETACHMENT), BILATERAL: Status: ACTIVE | Noted: 2020-01-01

## 2020-05-08 PROBLEM — I10 ESSENTIAL HYPERTENSION: Status: ACTIVE | Noted: 2020-01-01

## 2020-05-08 PROBLEM — J44.1 OBSTRUCTIVE CHRONIC BRONCHITIS WITH EXACERBATION (H): Status: ACTIVE | Noted: 2020-01-01

## 2020-05-08 NOTE — NURSING NOTE
"Chief Complaint   Patient presents with     Pre-Op Exam       Initial /60   Pulse 57   Temp 96.2  F (35.7  C) (Tympanic)   Resp 18   SpO2 96%  Estimated body mass index is 31.64 kg/m  as calculated from the following:    Height as of 3/31/14: 1.803 m (5' 11\").    Weight as of 3/13/20: 102.9 kg (226 lb 13.7 oz).  Medication Reconciliation: complete  Trupti Mcfarland LPN    "

## 2020-05-08 NOTE — TELEPHONE ENCOUNTER
Skye from [facility] calling and would like if patient's covid and pre op results could be faxed to [facility] Attroxana Urias.    Fax number is [phone]

## 2020-05-08 NOTE — TELEPHONE ENCOUNTER
Progress note not completed yet. Once completed please follow prior instructions:  Skye from [facility] calling and would like if patient's covid and pre op results could be faxed to [facility] Sae Urias.     Fax number is [phone]    Thank You, Génesis Alvarez LPN

## 2020-05-11 NOTE — TELEPHONE ENCOUNTER
Call from Meredith at [facility] in [place].    Patient having surgery this morning at [facility]. Covid - 19 test results are needed prior to surgery.     Please fax results to Fax: [phone]  Attn: Nurse Mehta

## 2020-05-11 NOTE — TELEPHONE ENCOUNTER
The fax number is correct as far as I know. That is what was provided to me and confirmed when I repeated it to the nurse.

## 2020-05-12 NOTE — TELEPHONE ENCOUNTER
Received fax from [employer] in regards to Lamont Doran   :  1945     The following information was received via fax:    Resident returned from angioplasty/stent with orders to discontinue PRN omeprazole and start prevacid 24hr 15 mg cap po every day.    Ashley LeChevalier LPN

## 2020-05-12 NOTE — TELEPHONE ENCOUNTER
Approval faxed back to facility per Dr. Delaney. Ashley A. Lechevalier, LPN on 5/12/2020 at 3:14 PM

## 2020-05-13 NOTE — TELEPHONE ENCOUNTER
Received fax from [employer] in regards to Lamont Doran   :  1945     The following information was received via fax:    Please review residents BP's listed below from 2020 to 2020. Resident is on metoprolol succinate 12.5mg BID. Can we have parameters on when to hold medication?    96/63, 98/58, 108/84, 112/50, 142/88, 100/58, 130/56, 114/50, 133/68, 138/70, 138/70, 120/71, 90/46, 76/38, 98/68, 98/44, 98/44, 108/54, 148/62, 110/72, 84/52, 122/68, 122/68, 90/50, 103/64, 118/58    Ashley LeChevalier LPN

## 2020-05-13 NOTE — TELEPHONE ENCOUNTER
Recommendation faxed back to facility per Dr. Delaney. Ashley A. Lechevalier, LPN on 5/13/2020 at 3:30 PM

## 2020-05-14 PROBLEM — I73.9 PERIPHERAL VASCULAR DISEASE (H): Status: ACTIVE | Noted: 2020-01-01

## 2020-05-14 PROBLEM — R55 SYNCOPE: Status: ACTIVE | Noted: 2020-01-01

## 2020-05-14 PROBLEM — R55 EPISODE OF SYNCOPE: Status: ACTIVE | Noted: 2020-01-01

## 2020-05-14 NOTE — ED NOTES
"Patient arrives via [facility] ambulance for evaluation of loss of consciousness. Patient comes from [facility]. Staff reporting patient had multiple episodes this morning where \"he blacked out for about 3 minutes.\" BP of 80/36. On arrival patient is alert and oriented, stating he does not remember the episodes. Denies a cough. Denies fevers. Reports mild shortness of breath, history of COPD.   "

## 2020-05-14 NOTE — PLAN OF CARE
[facility] Inpatient Admission Note:    Patient admitted to 3302/3302-01 at approximately 3:45 PM via cart accompanied by transport tech from emergency room . Report received from Jayleen RN in SBAR format at 3:18 PM via telephone. Patient ambulated to bed via self.. Patient is alert and oriented X 3, denies pain; rates at 0 on 0-10 scale.  Patient oriented to room, unit, hourly rounding, and plan of care. Explained admission packet and patient handbook with patient bill of rights brochure. Will continue to monitor and document as needed.     Inpatient Nursing criteria listed below was met:    Health care directives status obtained and documented: Yes    Care Everywhere authorization obtained No    MRSA swab completed for patient 65 years and older: Yes    Patient identifies a surrogate decision maker: Yes If yes, who: wife Sandra -  Contact Information: [phone]     If initial lactic acid >2.0, repeat lactic acid drawn within one hour of arrival to unit: NA.    Vaccination assessment and education completed: Yes   Vaccinations received prior to admission: Pneumovax yes  Influenza(seasonal)  YES   Vaccination(s) ordered: not given today because patient is up to date    Clergy visit ordered if patient requests: No - patient declined    Skin issues/needs documented: Yes    Isolation Patient: yes Education given, correct sign in place and documentation row added to PCS:  Yes    Fall Prevention Yes: Care plan updated, education given and documented, sticker and magnet in place: Yes    Care Plan initiated: Yes    Education Documented (including assessment): Yes    Patient has discharge needs : No

## 2020-05-14 NOTE — TELEPHONE ENCOUNTER
"Received fax from [facility] in regards to Lamont Doran   :  1945     The following information was received via fax:    Resident had 2 episodes of \"fainting\" in the past hour. BP 80/36 1st time and 73/43 2nd time. Episodes about 3 minutes long. Resident has no recollection of episodes. O2 decreased to 68% during episode. Increased to 91% on 3 LPM after, Pulse 82. Resident sent to ER as resident is a full code.    Ashley LeChevalier LPN            "

## 2020-05-14 NOTE — H&P
Admitted:     05/14/2020      CHIEF COMPLAINT:  Syncope.      HISTORY OF PRESENT ILLNESS:  The patient is a 74-year-old gentleman who resides at [facility] for rehab purposes.  Apparently there, he was undergoing physical therapy and from what I understand, he was standing up and had a couple episodes of syncope.  Pressures were in the 80s.  He was sent to our ER for evaluation.  When he arrived to our facility, his blood pressures were stable in the 120 systolic range, heart rates in the 60s.  He is alert and oriented without any other issues.  No fevers at all.  The patient was not injured during his falls.  He has had recurrent episodes of syncope, which is felt to be orthostatic in nature.  He was recently over at [facility] for evaluation of these episodes.  He had an echocardiogram performed, which showed normal systolic function.  He had a CT angiogram of his chest, showing no pulmonary embolism.  MRI of the brain was negative.  Telemetry during the time he was there showed sinus of a dysrhythmia PACs.  His echo as said showed no structural disease.  He also had some hemoptysis while he was there.  He underwent bronchoscopy that showed some alveolar hemorrhage, and polymicrobial sputum was noted.  Vascular:  The latest panel was negative.  Initially, they held his diuretics, but then gradually we reintroduced them as he does have a history of some fluid retention.  He was sent to [facility] rehab on 03/19.  From my review of the charts there, he had no episodes.  He had 1 episode of his orthostatic hypotension, but beyond that tolerated it well and was discharged to [facility] where he has not had any big issues.  He says he has been wearing the SELVIN hose.  He was started on midodrine.  He has been eating and drinking well without problem.  In fact, he just underwent a surgical procedure at [facility] on 05/11.  He had a left common and left external iliac angioplasty and stents placed by  Dr. George.  That was on  and was sent back to the nursing home.  He says he is maybe a little sore where they did things but for the most part no issues whatsoever.  He has not had any fevers, chills, sweats, cough, or purulent sputum that I know of.  He is always somewhat dyspneic because he does have COPD.  Has had no bleeding issues at all.  He said his appetite has been good.  He cannot really use his right side very well due to past traumas and such.  He said he has been walking short distances with PT with a wheelchair behind him.      His course in the ER was unremarkable.  Blood pressures have been constantly in stable in the 120-150 systolic range.  Pulse has been in the 70s without any arrhythmias.  He has been afebrile.  His O2 sats have been 90-95% on room air.  He is having no chest pains.      The ER physician apparently contacted [facility] where they basically said he should just be monitored.  His troponin was 0.07.  They also, in our ER, performed a CT angiogram, which once again was unremarkable for pulmonary embolism.  CTA of the head and neck shows no major problems.  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  Therefore, he is being admitted basically because of these recurrent episodes of syncope again, which in the past have appeared to be orthostatic in nature.      PAST MEDICAL HISTORY:  Significant for the followin.  History of GERD.   2.  Peripheral vascular disease.  He is status post angioplasty and stenting just a couple days ago to his left internal and external iliac arteries, stenting and angioplasty.   3.  Osteoarthritis of the lumbar spine including chronic low back pain.   4.  History of heart failure with preserved ejection fraction and mild pulmonary hypertension on echocardiogram.  He has normal systolic function.   5.  Chronic obstructive pulmonary disease with supplemental oxygen at nighttime.   6.  History  of a pulmonary nodule.  Has been followed via CT scan.   7.  Hyperlipidemia.   8.  Hypertension.   9.  Chronic lower extremity weakness.   10.  Colonic polyps.   11.  Bilateral inguinal herniorrhaphies.   12.  Borderline hyperglycemia.   13.  Carotid artery stenosis, status post left carotid endarterectomy.   14.  He is status post history of ischemic cardiomyopathy.   15.  He has had coronary artery angioplasty stenting back in 2013.     16.  He had a right carotid endarterectomy in 2011 and the left in 2015.     17.  Carpal tunnel release.     18.  He had a C4 to C7 anterior cervical diskectomy and fusion.   19.  Bilateral herniorrhaphies.   20.  He had a fem-fem bypass back in 1999.   21.  He has had a sternotomy.  I believe it was done for a coronary bypass grafting in the past.  I do not have any of the details regarding that.      ALLERGIES:  HE HAS NO KNOWN DRUG ALLERGIES.      SOCIAL HISTORY:  He is an ex-smoker, quit back in 1990.  Current nondrinker, no drugs.  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  .      FAMILY HISTORY:  Father had a stroke.      MEDICATIONS:  That I have from the nursing home:   1.  Aspirin 81 mg daily.   2.  Lasix 60 mg daily.   3.  Toprol-XL 12.5 mg twice a day.   4.  Prevacid 15 mg daily.   5.  Spiriva/Respimat 2 puffs daily.   6.  Flomax 0.4 mg daily.   7.  Midodrine 10 mg 3 times a day.   8.  Proscar 5 mg daily.   9.  Clopidogrel 75 mg daily.   10.  Symbicort 160/4.5 two puffs twice a day.   11.  Lipitor 80 mg daily.   12.  Albuterol inhaler 2 puffs every 4 hours as needed.      REVIEW OF SYSTEMS:  Pertinent positives and negatives noted above in the HPI.      PHYSICAL EXAMINATION:   GENERAL:  Alert, pleasant, talkative, elderly gentleman in no distress.   VITAL SIGNS:  His last vital signs show blood pressure 140/90.  Heart rate is 70 and regular.  He is afebrile.  Sats are 95% on room air.   HEENT:  Normocephalic, atraumatic.  Pupils equal, round, and reactive.   Sclerae are clear.  Mucous membranes are moist.   NECK:  Supple, no obvious lymphadenopathy or thyromegaly.     LUNGS:  Have slightly decreased breath sounds, occasional rhonchi, no areas of consolidation.   CARDIAC:  Reveals a regular rate and rhythm, normal S1, S2, no audible murmurs, rubs or gallops.  Neck veins are flat, no carotid bruits.   ABDOMEN:  Soft, nontender.  Bowel sounds are positive.  No masses or organomegaly.   EXTREMITIES:  He has perhaps 1+ edema.   NEUROLOGIC:  He has weakness on his right side.  This is chronic for him.  He can raise his right leg up off the bed to about 12 inches.  Left he can raise up to about 2 feet.  Right arm is also somewhat weaker due to a previous trauma.  Cranial nerves are intact.  Speech is clear and appropriate.      LABORATORY WORK:  Sodium is 140, potassium 3.8, chloride 108, CO2 of 28, BUN 23, creatinine 0.91, glucose 114.  Troponin 0.074.  Calcium 8.2, albumin 1.4, total protein 5.7, total bilirubin 0.3, alkaline phosphatase 74, ALT 13, AST 17.  Hemoglobin was 10.6, white count 7700, platelet count 158,000.  D-dimer is 2.3.  INR is 1.12.  Urinalysis is unremarkable.  He has 3 whites, 8 reds, no bacteria and 100 mg/dL protein.      IMAGING:  EKG shows no acute changes.  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  No other acute abnormalities.  CT of chest shows some confluent alveolar opacities in both lungs, small bilateral pleural effusions, some enlarged lymph nodes, consider pneumonia.  No evidence of any pulmonary emboli.  CT angiogram of his head and neck showed occluded right vertebral artery, 50% stenosis of proximal right internal carotid artery.      ASSESSMENT:   1.  Syncopal episode.  The patient has had these on multiple occasions in the past . He was over at [facility] and worked up pretty significantly.  They felt was orthostatic in nature.  He was started on midodrine.  He should wear some SELVIN hose.  They had no other obvious reason for these episodes.   Currently, once again, I doubt this was an arrhythmia.  He does not appear to be having any issues with being too dry.  All I can really safe to do is we will recheck another troponin.  We will monitor him on telemetry for at least another 24 hours.  I am going hold his diuretics today.  We will repeat his labs tomorrow.  Will have PT see him.  He will continue with midodrine.  We will also have him wear some support hose also.  His hemoglobin is stable.  No signs of any hemorrhage at all.   2.  Rule out COVID-19.  The patient was recently at [facility].  Going back to nursing home, it does need to be ruled out.  He will be in the PUI unit until tomorrow's values come back.   3.  History of heart failure with preserved ejection fraction.  Just worked up at [facility], normal systolic function.  Really does not appear to be grossly volume overloaded.  We will continue with his usual medical regimen.  I am just going to hold his diuretics here currently.   4.  Chronic obstructive pulmonary disease.  Continue with his bronchodilators.  No obvious signs of any exacerbation.  His CT shows some questionable infiltrates; however, clinically he has no dyspnea.  Oxygen sats are fine.  No fever.  I am not going to call this a pneumonia at all at this point.   5.  Peripheral vascular disease.  Just underwent a left internal and external iliac stenting.  Stable.  No signs of any ischemia.  He will be admitted to observation status.  Hopefully this be less than or equal to 2 midnights.      CODE STATUS:  He is a full code at this point.         CHERELLE SARABIA MD             D: 2020   T: 2020   MT:       Name:     GUNNER DANG   MRN:      [id number]        Account:      [id number]   :      1945        Admitted:     2020                   Document: [id number]       cc: Jordin Aleman MD

## 2020-05-14 NOTE — PLAN OF CARE
Update: Jessica called me back about spiriva. Pharmacy filled 1.25 mcg/actuation, the [facility] RTS stated 2.5 mcg, so I called to get clarification to what the patient is ACTUALLY taking. Patient has been taking the 1.25 mcg actuation inhaler at the [facility] but is SUPPOSED to be on the 2.5 mcg inhaler one inhalation daily. I will let the provider know.    Dana Benitez on 5/14/2020 at 3:39 PM

## 2020-05-14 NOTE — PLAN OF CARE
Prior to Admission Medication Reconciliation:     Medications added:   [] None  [x] As listed below:    Prevacid per RTS    Medications deleted:   [] None  [x] As listed below:    Omeprazole per RTS    Changes made to existing medications:   [] None  [x] As listed below:    Input appropriate strengths and frequencies    Asa from 81 mg to 162 mg per rx and MAR    spiriva     Last times/dates taken verified with patient:  [x] Yes- completed myself  [] Nurse completed no changes made  [] Unable to review with patient:  [] Nurse completed/changes made:       Allergy modifications:    [x]Did not review  []Patient verified NKA  []Patient verified current existing allergies: no changes made  []New allergies: listed below      Medication reconciliation sources:   []Patient  []Patient family member/emergency contact: **  [][facility] Report Review  []Epic Chart Review  []Care Everywhere review  []Pharmacy med list: **  [x]Pharmacy phone call [facility]    Went over entire medlist with pharmacist intern  []Outside meds dispense report  [x][facility]    tamsulosin 0.4 mg daily at 0800,  Given 5/14 0829    Loperamide 2 mg q2h prn    Finasteride 5 mg daily at 0800, given 5/14 at 0829    Clopidogrel 75 mg every day at 0800, given 5/14 at 0829     Asa 81 mg 2 tabs every day at 0800, given 5/14 0834    Midodrine 5 mg, 2 tabs TID at 0800, 1200 and 1600, given 5/14 at 0829    lipitor 80 mg tab daily at 2000, given at 5/13 at 1907    Potassium chloride ER 20 MEQ every day at 0800, given 5/14 at 0829    Lasix 20 mg, 3 tabs every day at 0800, given 5/14 0829    Quetiapine 25 mg at bedtime at , given 5/13 1907    symbicort 180-4.5 2 puffs BID at 0800 and 2000, given 5/14 at 0829    spiriva respimat 2.5 mcg/actuation one inhalation per day at 0800, given 5/14 at 0829    Ventolin 2 puffs PRN q4h for SOB    Metoprolol succ 25 mg 1/2 tablet BID at 0800 and 2000, 5/13 at 0854    Prevacid 24 hour 15  mg daily at 0800  []Other: **    Pharmacy desired at discharge: [facility]    Is patient on coumadin?  [x]No    Requests for consultation by provider or pharmacist:   [] Patient understands why all of their meds were prescribed and how to take them. No questions.   [x] Fill dates coincide with compliancy for all maintenance meds.   [] Fill dates do not coincide with compliancy with maintenance meds. See notes in PTA medlist about how patient is taking.   [] Patient has questions about the following:        Comments: no concerns. Called to make sure no metoprolol was given today because it was not on the RTS as administered today. Nurse said it was not given at all today. Spiriva 1.25 mcg was dispensed in April last but nurse said it was supposed to be the 2.5. They couldn't find the box at the nursing home to verify which one patient has been taking but since it is supposed to be 2.5 (per nurse) I left it in there as that.      Dana Benitez on 5/14/2020 at 2:55 PM       Discrepancies: [x] No []Not Applicable []Yes: listed below        Time spent on medication reconciliation:   []5-20 mins  [x]20-40 mins  []> 40 mins    Issues completing PTA medication reconciliation:  [] On hold for a long time  [] Waited for a call back  [] Fax didn't come through  [x] Fax took a long time  [] Other:    Notifying appropriate party of changes/additions/discrepancies:  []No changes made, notification not necessary.   [x] Notified attending provider via text page  [] Notified attending provider in person  [] Notified pharmacy  [] Notified nurse  [] Attending provider not available, left detailed notes  [] Changes/additions made don't need provider notification because provider has not seen patient or input any orders  [] Changes/additions made don't need provider notification because changes made are to medications not ordered      (Not in a hospital admission)

## 2020-05-14 NOTE — DISCHARGE INSTRUCTIONS
What to expect when you have contrast    During your exam, we will inject  contrast  into your vein or artery. (Contrast is a clear liquid with iodine in it. It shows up on X-rays.)    You may feel warm or hot. You may have a metal taste in your mouth and a slight upset stomach. You may also feel pressure near the kidneys and bladder. These effects will last about 1 to 3 minutes.    Please tell us if you have:    Sneezing     Itching    Hives     Swelling in the face    A hoarse voice    Breathing problems    Other new symptoms    Serious problems are rare.  They may include:    Irregular heartbeat     Seizures    Kidney failure              Tissue damage    Shock      Death    If you have any problems during the exam, we  will treat them right away.    When you get home    Call your hospital if you have any new symptoms in the next 2 days, like hives or swelling. (Phone numbers are at the bottom of this page.) Or call your family doctor.     If you have wheezing or trouble breathing, call 911.    Self-care  -Drink at least 4 extra glasses of water today.   This reduces the stress on your kidneys.  -Keep taking your regular medicines.    The contrast will pass out of your body in your  Urine(pee). This will happen in the next 24 hours. You  will not feel this. Your urine will not  change color.    If you have kidney problems or take metformin    Drink 4 to 8 large glasses of water for the next  2 days, if you are not on a fluid restriction.    ?If you take metformin (Glucophage or Glucovance) for diabetes, keep taking it.      ?Your kidney function tests are abnormal.  If you take Metformin, do not take it for 48 hours. Please go to your clinic for a blood test within 3 days after your exam before the restarting this medicine.     (Note to provider:please give patient prescription for lab tests.)    ?Special instructions: -    I have read and understand the above information.    Patient Sign  Here:______________________________________Date:________Time:______    Staff Sign Here:________________________________________Date:_______Time:______      Radiology Departments:     ?[facility]: [phone] ?[place]: [phone]     ?[place]: [phone] ?[place]:[phone]      ?[place]: [phone]  ?[place]: [phone]  ?[place]:[phone]    ?[facility]:[phone]  ?[facility]:[phone]

## 2020-05-15 NOTE — PROGRESS NOTES
Called that pt may be going in and out of a fib, ekg is NSR with PAC's  On rhythm strip he may very well have brief runs of SVT  Likely will benefit from a zio patch  Will not change meds based on limited data at present

## 2020-05-15 NOTE — PLAN OF CARE
VS as charted, pt notified of negative covid results and transfer to medical surgical floor room 3224.    Face to face report given with opportunity to observe patient.    Report given to CHRIS Deleon RN   5/15/2020  4:30 PM

## 2020-05-15 NOTE — PROGRESS NOTES
"[facility]    Hospitalist Progress Note    Date of Service (when I saw the patient): 05/15/2020    Assessment & Plan   Lamont Doran is a 74 year old  man who was admitted on 5/14/2020.  He has been at [facility] for rehabilitation.  Apparently there, during a physical therapy session while he was standing up he had several episodes of syncope.  Pressures were in the 80s.    He was evaluated in emergency department where his blood pressures were stable in the 120 systolic range, heart rates in the 60s.  He is alert and oriented without any other issues.   He has had recurrent episodes of syncope, felt to be orthostatic in nature.  He was recently evaluated extensively at [facility].  Echocardiogram showed normal systolic function without evident structural abnormality.  CT of the chest in\" angiogram\" protocol, showing no pulmonary embolism.  MRI of the brain was negative.  Telemetry during the time he was there showed sinus with PACs.    He did have some hemoptysis while he was there.  He underwent bronchoscopy that showed alveolar hemorrhage, and polymicrobial sputum was noted.    At [facility] initially diuretics were withheld and then gradually resumed.    Subsequently he underwent a surgical procedure at [facility] on 05/11 with a left common and left external iliac angioplasty and stents placed by Dr. George.    Apparently lower extremity compression hose were not resumed.  He says he is maybe a little sore where they did things but for the most part no issues whatsoever.  He has not had any fevers, chills, sweats, cough, or sputum production.  He is always somewhat dyspneic because he does have COPD.  Has had no bleeding issues at all.  He said his appetite has been good.  He cannot really use his right side very well due to past traumas.  He said he has been walking short distances with PT with a wheelchair behind him.     1.  Syncopal episode.    Single episode of irregular " rhythm on telemetry during the night most suggestive of PACs.  He has a another single episode of near syncope this afternoon on standing without other evident arrhythmia.  He does intermittently have some PACs but there does not appear to be any increasing trend.  He has been evaluated in the past for syncope with extensive evaluation at [facility].  Overall impression was of an orthostatic syncope/presyncope.  He was then started on midodrine.  He had been wearing lower extremity Noah compression hose which had been stopped after peripheral angioplasty.  Possibly this has contributed to his recurrent symptoms.  At the present time continue midodrine.  Compression hose resumed today.  Continuing telemetry.  Overall I doubt that he has new arrhythmia and particularly given syncopal symptoms without any apparent significant tachyarrhythmia is unlikely that outpatient cardiac monitoring will be fruitful.  He had appeared to be mildly volume depletion on presentation although currently appears to be euvolemic.  No evidence of ischemia.  Encouraging oral intake and with holding diuretics for another day.  He is treated with tamsulosin although I doubt this is contributing significantly.  PT evaluation once COVID PCR returns.  2.  Rule out COVID-19.    This afternoon COVID PCR returned nonreactive.  Transferred from PI unit.  He was appropriately evaluated given his recent hospitalization [place] and nursing home residence.    3.  History of heart failure with preserved ejection fraction.    Evaluated just recently at [facility] with findings of preserved systolic function.  No volume overload evident.  With exception of holding diuretics for another day continue other regimen for heart failure including beta blockade and aspirin.   4.  Chronic obstructive pulmonary disease.    Continue his chronic bronchodilators.  No evidence of any exacerbation.  His CT shows some increased interstitial markings and  alveolar decreased attenuation however doubt infection.  Other inflammation also unlikely but could be considered.  Clinically this does not represent a lower respiratory tract infection.  5.  Peripheral vascular disease.    He just underwent a left internal and external iliac stenting.  No signs of any ischemia.      Active Problems:    Episode of syncope    Peripheral vascular disease (H)    Syncope       DVT Prophylaxis: Encourage walking  Code Status: Full Code    Disposition: Expected discharge in another 1-2 days depending on his course    Jed Alfonso    Interval History   Awake and alert.  Single episode of near syncope this afternoon.  No dyspnea.  No pain.  Fair oral intake.    -Data reviewed today: I reviewed all new labs and imaging results over the last 24 hours.       Peripheral IV 05/15/20 Left Lower forearm (Active)   Site Assessment WDL 05/15/20 0845   Line Status Infusing;Checked every 1-2 hour 05/15/20 0845   Phlebitis Scale 0-->no symptoms 05/15/20 0845   Infiltration Scale 0 05/15/20 0845   Number of days: 0       Urethral Catheter (Active)   Tube Description Positional 05/15/20 0745   Catheter Care Soap and water/perineal cleanser 05/15/20 0745   Collection Container Standard 05/15/20 0745   Securement Method Securing device (Describe) 05/15/20 0745   Rationale for Continued Use Retention 05/15/20 0745   Urine Output 575 mL 05/15/20 1435   Number of days: 4     Line/device assessment(s) completed for medical necessity May 10    Physical Exam   Temp: 96.9  F (36.1  C) Temp src: Tympanic BP: 135/64   Heart Rate: 61 Resp: 20 SpO2: 93 % O2 Device: Nasal cannula Oxygen Delivery: 4 LPM  There were no vitals filed for this visit.  Vital Signs with Ranges  Temp:  [96.9  F (36.1  C)-98.4  F (36.9  C)] 96.9  F (36.1  C)  Heart Rate:  [60-76] 61  Resp:  [10-20] 20  BP: ()/(40-65) 135/64  SpO2:  [89 %-94 %] 93 %  I/O last 3 completed shifts:  In: 3409 [P.O.:1400; I.V.:2009]  Out: 2250  [Urine:2250]    Awake, alert, interactive man sitting in chair on PUI unit.  HEENT: Pupils equal, conjugate. No icterus or nystagmus. Oral mucosa moist. No facial asymmetry.   Neck: Supple, jugular veins not elevated. Trachea midline   Chest: No chest wall movement asymmetry. Aeration minimally diminished globally. Accessory muscles not in use. Expiratory time not increased. No tidal wheezes.  Few scattered basilar rhonchi. No discrete crackles.   Cardiac: PMI not displaced. S1, S2 unremarkable. No S3, S4. P2 not accentuated. No murmurs..   Abdomen: Soft. No palpation or percussion tenderness. No distention. Normoactive bowel sounds. Liver and spleen not increased in size. No bruits, masses, or pulsations.   Extremities: Minimal bilaterally equal lower extremity edema. No eccymoses, clubbing warm distally.   Neurologic: Mental state above.  Mild right lower extremity and less pronounced upper extremity paresis, unknown deficit. Tone preserved. No fasiculations or tremors.    Medications     sodium chloride 50 mL/hr at 05/15/20 0845       aspirin  162 mg Oral Daily     atorvastatin  80 mg Oral QPM     budesonide-formoterol  2 puff Inhalation BID     clopidogrel  75 mg Oral Daily     finasteride  5 mg Oral Daily     metoprolol succinate ER  12.5 mg Oral BID     midodrine  10 mg Oral TID     pantoprazole  20 mg Oral QAM AC     potassium chloride ER  20 mEq Oral Daily     QUEtiapine  25 mg Oral At Bedtime     sodium chloride (PF)  3 mL Intracatheter Q8H     tamsulosin  0.4 mg Oral Daily     tiotropium  2 puff Inhalation Daily           Data   Recent Labs   Lab 05/15/20  0511 05/14/20  1608 05/14/20  1004   WBC 7.6  --  7.7   HGB 10.3*  --  10.6*   MCV 82  --  81   *  --  158   INR  --   --  1.12     --  140   POTASSIUM 3.7  --  3.8   CHLORIDE 109  --  108   CO2 29  --  28   BUN 22  --  23   CR 0.85  --  0.91   ANIONGAP 3  --  4   CLARA 7.9*  --  8.2*   *  --  114*   ALBUMIN 1.2*  --  1.4*   PROTTOTAL  5.2*  --  5.7*   BILITOTAL 0.3  --  0.3   ALKPHOS 66  --  74   ALT 11  --  13   AST 15  --  17   TROPI  --  0.057* 0.074*       Lactic Acid   Date Value Ref Range Status   03/13/2020 0.9 0.7 - 2.0 mmol/L Final       No results found for this or any previous visit (from the past 24 hour(s)).

## 2020-05-15 NOTE — PLAN OF CARE
Checked pt VS and blood pressure was 77/40 with HR of 60 - pt stated a little light headed (not bad) wanted to get back to bed - transferred back with assist of one with gait belt and walker - stood and transferred well to bed - then proceeded to have a quick syncopal episode, came to quickly and then with recheck of /64 with HR of 61 -  notified

## 2020-05-15 NOTE — PLAN OF CARE
Pt has been afebrile through the day, his blood pressures are very soft - no new orders - ok to give his meds per MD; his HR has been in the 60-70's, and this afternoon a very brief bump into the 120's.  His O2 sats are in the low 90's on 4 LPM via NC, lung sounds are clear, he does have an infrequent productive cough - swallowed sputum. He does get a little JOHNSON/SOB with movement. He did have one brief syncopal episode just prior to lunch - recovered quickly - does state he get dizzy/lightheaded with movement.  He does have IV fluids running at 50 mL/hr (new IV placed this morning.) Very good oral intake.  His Clifton is producing catherine urine 575 mL.  He does have compression socks applied this afternoon - continues to have 1-2+ edema R>L more of 2-3+ this afternoon. He did sit up in chair all morning - back to bed just prior to lunch.  He has remained free of falls, call light within reach - does make his needs known, frequent rounding done to assess needs.     Face to face report given with opportunity to observe patient.    Report given to Shell Palacios RN   5/15/2020  3:22 PM

## 2020-05-15 NOTE — PROGRESS NOTES
Assessment completed by phone call with patient..    LOC: alert and oriented X4    Dx: fall, Syncope episode  Chronic Disease Management: PVD, HTN, Panlobular emphysema, Obstructive chronic bronchitis w/exacerbation, HX of MI, posterior vitreous detachment, bilateral    Lives with: Currently at SNF for rehab.  Living at:  [place], [place]  Transportation: YES , via [employer]    Primary PCP: Jordin Aleman  Insurance:  [facility]/Medicare  Medicare BARBA letter reviewed with patient on 5/15/2020 verbally via telephone.  Patient verbalizes understanding of letter.    Support System:  Family involved  Homecare/PCA: N/A  [place]: YES      How was the [facility] notification completed: Yes, [facility]    Health Care Directive: NO   POA: No    Pharmacy: [facility], [place], [place]  Meds management: YES , managed currently through nursing at SNF([place], [place])    Adequate Resources for needs (housing, utilities, food/med): YES     Work/community/social activity: YES , patient verbalizes that he keeps busy around the house when at home.    ADLs: Assist of one for weakness r/t (Right side post surgery on 5/11/2020)  Ambulation: Uses walker and cane for stability.  Falls: has had 3 falls in last few months  Nutrition: No issues, good appetite  Sleep: No concerns w/sleep pattern.    Equipment used: O2  currently in hospital    RT to set up for transportation back to SNF     Mental health: Verbalized general depression from being away from home.  Substance abuse: none reported  Exposure to violence/abuse: No  Stressors: Nothing additional to above    Able to Return to Prior Living Arrangements: YES        LUIS: Low Risk    Plan: Return to SNF via [employer] with O2 portable

## 2020-05-15 NOTE — PLAN OF CARE
Reason for hospital stay:  Syncopal episode    Living situation PTA: [facility] [place]    Most recent vitals: /65   Pulse 73   Temp 97.8  F (36.6  C) (Tympanic)   Resp 20   SpO2 94%     Pain Management:  Denied any pain this shift.     LOC:  A+O x4 - calls appropriately and makes needs known    Cardiac: AP irregular - notified from ICU nurse at approximately 9:25 PM that patient flipped into A-Fib - patient denied any chest pain, dizziness / lightheadedness, or shortness of breath - EKG completed - Dr. Mccollum updated and in to see patient at 9:45 PM - no new orders received.   Bilateral feet and ankles +2 pitting edema. Pedal pulses weak.    Respiratory:  Lungs with crackles to bilateral bases. Sats 94% on 4LPM.  Complains of some shortness of breath but states is normal for him.     GI:  Bowel sounds hypoactive, good appetite - ate all of supper.     :  Clifton patent and draining clear yellow urine.     IVF:  NS infusing at 50mL/hr    ABX:  None    Ambulation: Standby assist    Safety:  Alarms on      5/15/2020  1:29 AM  Pavan Dan RN

## 2020-05-15 NOTE — PLAN OF CARE
Face to face report given with opportunity to observe patient.    Report given to Jaja Dan RN   5/14/2020  11:30 PM

## 2020-05-15 NOTE — PLAN OF CARE
Pt has been afebrile throughout the shift. VSS. /50   Pulse 73   Temp 98.4  F (36.9  C) (Tympanic)   Resp (!) 10   SpO2 (!) 90% . Anterior lung sounds occasional crackles, clears with cough. Frequent dry cough. Currently on 4 LPM sats maintaining 88 % and above. Apical pulse sinus dysrhythmias 50-60s per ICU telemetry. BS active-denies N/V.  ml light yellow urine. Pt slept throughout the night-no falls this shift. Pt is able to make needs known. Call light within reach.     Face to face report given with opportunity to observe patient.    Report given to Mayra Germain   5/15/2020  7:19 AM

## 2020-05-16 NOTE — PLAN OF CARE
Episode of dizziness and hypotension upon getting out of bed this morning.  72/36 while sitting in w/c.  Returned to bed with head of bed flat.  Dizziness resolved and BP 94/45.  Denies pain.  Alert and oriented x3.  Crackles in bases bilaterally.  Oxygen at 4 liters NC.  Compression stockings to legs.  Feet and ankles with slight edema.  Clifton intact with clear, catherine urine.  Up with assist of 1.  Attempted to ambulate, able to take 3 steps to end of bed.      Face to face report given with opportunity to observe patient.    Report given to Meka Hurst RN   5/16/2020  3:17 PM

## 2020-05-16 NOTE — PLAN OF CARE
Vitals: VSS. On 3L's chronically. Currently on 4L, sats right around 90%. Afebrile.     Pain: Denied    Orientation: A&O. No reporting of dizziness or light headed.     Cardiac: Irr. Tele monitoring reading Sinus Dysrhythmia in 70's with occasional PVC's per ICU.     Lungs: Clear with crackles in Bases. Infrequent, cough.     ABD: Bowels Active.    Urinating: Chronic trinh in for retention. Adequate amount out.     Skin: Bruised.     IV fluids: NS at 50.     Antibiotics: None    Mobility: Up with assist of 1. Gait belt and walker used.     Safety: Bed alarm on. Call light in reach. Rounding done.    Comment: Slept well throughout night.      Face to face report given with opportunity to observe patient.    Report given to Khadijah Bullock RN   5/16/2020

## 2020-05-16 NOTE — PROGRESS NOTES
"[facility]    Hospitalist Progress Note    Date of Service (when I saw the patient): 05/16/2020    Assessment & Plan   Lamont Doran is a 74 year old  man who was admitted on 5/14/2020.  He has been at [facility] for rehabilitation.  Apparently there, during a physical therapy session while he was standing up he had several episodes of syncope.  Pressures were in the 80s.    He was evaluated in emergency department where his blood pressures were stable in the 120 systolic range, heart rates in the 60s.  He is alert and oriented without any other issues.   He has had recurrent episodes of syncope, felt to be orthostatic in nature.  He was recently evaluated extensively at [facility].  Echocardiogram showed normal systolic function without evident structural abnormality.  CT of the chest in\" angiogram\" protocol, showing no pulmonary embolism.  MRI of the brain was negative.  Telemetry during the time he was there showed sinus with PACs.    He did have some hemoptysis while he was there.  He underwent bronchoscopy that showed alveolar hemorrhage, and polymicrobial sputum was noted.    At [facility] initially diuretics were withheld and then gradually resumed.    Subsequently he underwent a surgical procedure at [facility] on 05/11 with a left common and left external iliac angioplasty and stents placed by Dr. George.    Apparently lower extremity compression hose were not resumed.  He says he is maybe a little sore where they did things but for the most part no issues whatsoever.  He has not had any fevers, chills, sweats, cough, or sputum production.  He is always somewhat dyspneic because he does have COPD.  Has had no bleeding issues at all.  He said his appetite has been good.  He cannot really use his right side very well due to past traumas.  He said he has been walking short distances with PT with a wheelchair behind him.     1.  Syncopal episode.    Have not identified " explanation beyond orthostatic hypotension/syncope.  Telemetry has shown PACs but no other arrhythmias.  These identified previously especially during relatively extensive work-up at [facility].  This morning he had an episode of symptomatic dizziness without dianne syncope associated with a relatively lower blood pressure.  Systolic blood pressure 77.  5/15 single episode of irregular rhythm on telemetry during the night most suggestive of PACs.  He had a another single episode of near syncope 5/15 afternoon on standing without other evident arrhythmia.  Lower extremity compression hose which he had prior to his peripheral angioplasty resumed as of yesterday morning.  Chronic urethral catheter although he has planned a virtual urology visit on Monday to reevaluate this.  However given his prominent symptoms discontinuing finasteride which in fact has a relatively high incidence of orthostatic hypotension.  Initially he had discontinued tamsulosin although the incidence of hypotension with this is relatively modest and in the event that discontinuation of catheter could be considered might be reasonable to resume this.  In other respects the degree of hypotension with dizziness which she has had is less than that which led him to admission.  Have continue to withhold diuretics and decreased already low dose of metoprolol to 12.5 mg daily although he is not had any clear bradycardia.  Trial of increasing midodrine to 12.5 mg 3 times daily.  Usual maximum dose usually listed at 17.5 mg.  If he continues to show infrequent episodes of dizziness particularly if it is isolated to the morning when he notices most difficulties may be most reasonable to observe his course before making other alterations in treatment.  If he has no further untoward events in the next day or so may be able to consider discharge then.  He is already at [facility] for rehabilitation and physical therapy here is unlikely to add  significantly to evaluation or treatment.    2.  Rule out COVID-19.    This afternoon COVID PCR returned nonreactive.  Transferred from PI unit.  He was appropriately evaluated given his recent hospitalization [place] and nursing home residence.    3.  History of heart failure with preserved ejection fraction.    Evaluated just recently at [facility] with findings of preserved systolic function.  No volume overload evident.  With exception of holding diuretics for another day continue other regimen for heart failure including beta blockade and aspirin.  May consider low-dose of diuretics in the future.  No peripheral edema currently.  4.  Chronic obstructive pulmonary disease.    Continue his chronic bronchodilators.  No evidence of any exacerbation.  His CT shows some increased interstitial markings and alveolar decreased attenuation however doubt infection.  Other inflammation also unlikely but could be considered.  Clinically this does not represent a lower respiratory tract infection.  5.  Peripheral vascular disease.    He just underwent a left internal and external iliac stenting.  No signs of any ischemia.      Active Problems:    Episode of syncope    Peripheral vascular disease (H)    Syncope       DVT Prophylaxis: Encourage walking  Code Status: Full Code    Disposition: Expected discharge in another 1-2 days depending on his course    Jed Alfonso    Interval History   Awake and alert.  Single episode of near syncope this afternoon.  No dyspnea.  No pain.  Fair oral intake.    -Data reviewed today: I reviewed all new labs and imaging results over the last 24 hours.       Peripheral IV 05/15/20 Left Lower forearm (Active)   Site Assessment WDL 05/16/20 0740   Line Status Infusing 05/16/20 0740   Phlebitis Scale 0-->no symptoms 05/16/20 0740   Infiltration Scale 0 05/16/20 0740   Number of days: 1       Urethral Catheter (Active)   Tube Description Positional 05/16/20 0056   Catheter Care Done  05/16/20 0056   Collection Container Standard 05/16/20 0740   Securement Method Securing device (Describe) 05/16/20 0740   Rationale for Continued Use Retention 05/16/20 0740   Urine Output 525 mL 05/16/20 0705   Number of days: 5     Line/device assessment(s) completed for medical necessity May 15  Physical Exam   Temp: 97.2  F (36.2  C) Temp src: Temporal BP: (!) 104/40 Pulse: 58 Heart Rate: 64 Resp: 20 SpO2: 92 % O2 Device: Nasal cannula Oxygen Delivery: 5 LPM  There were no vitals filed for this visit.  Vital Signs with Ranges  Temp:  [97.1  F (36.2  C)-98.4  F (36.9  C)] 97.2  F (36.2  C)  Pulse:  [58-75] 58  Heart Rate:  [64-77] 64  Resp:  [20] 20  BP: ()/(36-69) 104/40  SpO2:  [86 %-95 %] 92 %  I/O last 3 completed shifts:  In: 1869 [P.O.:1160; I.V.:709]  Out: 1350 [Urine:1350]    Awake, alert, interactive man sitting in bed on medical wards.  HEENT: Pupils equal, conjugate. No icterus or nystagmus. Oral mucosa moist. No facial asymmetry.   Neck: Supple, jugular veins not elevated. Trachea midline   Chest: No chest wall movement asymmetry. Aeration minimally diminished globally. Accessory muscles not in use. Expiratory time not increased. No tidal wheezes.  Few scattered basilar rhonchi. No discrete crackles.   Cardiac: PMI not displaced. S1, S2 unremarkable. No S3, S4. P2 not accentuated. No murmurs..   Abdomen: Soft. No palpation or percussion tenderness. No distention. Normoactive bowel sounds. Liver and spleen not increased in size. No bruits, masses, or pulsations.   Extremities: No significant lower extremity edema.  Compression hose in place bilaterally.  Peripheral pulses diminished but easily palpable.  No eccymoses, clubbing warm distally.   Neurologic: Mental state above.  Mild right lower extremity and less pronounced upper extremity paresis, unknown deficit. Tone preserved. No fasiculations or tremors.    Medications     sodium chloride 50 mL/hr at 05/15/20 0845       aspirin  162 mg Oral  Daily     atorvastatin  80 mg Oral QPM     budesonide-formoterol  2 puff Inhalation BID     clopidogrel  75 mg Oral Daily     metoprolol succinate ER  12.5 mg Oral Daily     midodrine  12.5 mg Oral TID     pantoprazole  20 mg Oral QAM AC     potassium chloride ER  20 mEq Oral Daily     QUEtiapine  25 mg Oral At Bedtime     sodium chloride (PF)  3 mL Intracatheter Q8H     tiotropium  2 puff Inhalation Daily           Data   Recent Labs   Lab 05/15/20  0511 05/14/20  1608 05/14/20  1004   WBC 7.6  --  7.7   HGB 10.3*  --  10.6*   MCV 82  --  81   *  --  158   INR  --   --  1.12     --  140   POTASSIUM 3.7  --  3.8   CHLORIDE 109  --  108   CO2 29  --  28   BUN 22  --  23   CR 0.85  --  0.91   ANIONGAP 3  --  4   CLARA 7.9*  --  8.2*   *  --  114*   ALBUMIN 1.2*  --  1.4*   PROTTOTAL 5.2*  --  5.7*   BILITOTAL 0.3  --  0.3   ALKPHOS 66  --  74   ALT 11  --  13   AST 15  --  17   TROPI  --  0.057* 0.074*       Lactic Acid   Date Value Ref Range Status   03/13/2020 0.9 0.7 - 2.0 mmol/L Final       No results found for this or any previous visit (from the past 24 hour(s)).

## 2020-05-16 NOTE — PLAN OF CARE
Reason for hospital stay:  Syncope  Living situation PTA: [facility]  Most recent vitals: /68   Pulse 73   Temp 97.2  F (36.2  C) (Tympanic)   Resp 20   SpO2 92%     Pain Management:  denies  LOC:  A&O  Cardiac:  SD 70's with frequent PVCs.   Respiratory:  Dyspnea on exertion 4 LPM NC. Frequent cough  :  Chronic trinh    IVF:  NS 50ml/hr      Safety:  Call light in reach, remains free of falls.    Comments:  No complaints of dizziness or lightheaded.       5/15/2020  10:37 PM  Adrienne Salinas RN     Face to face report given with opportunity to observe patient.    Report given to Aliza Salinas RN   5/15/2020  11:36 PM

## 2020-05-17 PROBLEM — J96.21 ACUTE ON CHRONIC RESPIRATORY FAILURE WITH HYPOXEMIA (H): Status: ACTIVE | Noted: 2020-01-01

## 2020-05-17 NOTE — PROGRESS NOTES
"[facility]    Hospitalist Progress Note    Date of Service (when I saw the patient): 05/17/2020    Assessment & Plan   Lamont Doran is a 74 year old  man who was admitted on 5/14/2020.  He has been at [facility] for rehabilitation.  Apparently there, during a physical therapy session while he was standing up he had several episodes of syncope.  Pressures were in the 80s.    He was evaluated in emergency department where his blood pressures were stable in the 120 systolic range, heart rates in the 60s.  He is alert and oriented without any other issues.   He has had recurrent episodes of syncope, felt to be orthostatic in nature.  He was recently evaluated extensively at [facility].  Echocardiogram showed normal systolic function without evident structural abnormality.  CT of the chest in\" angiogram\" protocol, showing no pulmonary embolism.  MRI of the brain was negative.  Telemetry during the time he was there showed sinus with PACs.    He did have some hemoptysis while he was there.  He underwent bronchoscopy that showed alveolar hemorrhage, and polymicrobial sputum was noted.    At [facility] initially diuretics were withheld and then gradually resumed.    Subsequently he underwent a surgical procedure at [facility] on 05/11 with a left common and left external iliac angioplasty and stents placed by Dr. George.    Apparently lower extremity compression hose were not resumed.  He says he is maybe a little sore where they did things but for the most part no issues whatsoever.  He has not had any fevers, chills, sweats, cough, or sputum production.  He is always somewhat dyspneic because he does have COPD.  Has had no bleeding issues at all.  He said his appetite has been good.  He cannot really use his right side very well due to past traumas.  He said he has been walking short distances with PT with a wheelchair behind him.     1.  Syncopal episode.    Remain most suspicious of " orthostatic hypotension.  He has had multiple episodes of syncope or presyncope with telemetry not showing abnormalities beyond relatively frequent PACs which appear to have been consistent for an extended period of time including observed to during extensive work-up at [facility].  Certainly not volume depleted and particularly based on the last 24 hours now borderline volume overload.  Unfortunately orthostatic hypotension difficult to treat.  Medication affects the most fruitful area to investigate.  Finasteride and tamsulosin begun during his evaluation at [facility] recently.  He did recently have peripheral angioplasty done there.  Possibly this has an association but by and an interruption in his treatment with lower extremity compression hose difficult to identify this as a cause.  As below have withheld furosemide this admission until last night when he developed volume overload and hypoxemic respiratory failure, now improved.  Finasteride in fact has relatively common association with orthostatic hypotension with held beginning dose 5/17.  Also withholding tamsulosin beginning 5/16 although association with orthostatic hypotension not as frequent.  I rated a low dose of metoprolol however decreased to single daily dose 12.5 mg daily beginning 5/16.  Increased dose of midrodine to 12.5 mg every 8 hours beginning 5/16.  Some increase in the dose of this could be considered as well.  Episodes of symptomatic dizziness are associated with hypotension with systolic pressures in a range of 70- 80.  None have been prolonged.  No clear association with any ischemia or structural heart disease.  As below does have evidence of left ventricular dysfunction although this is unlikely to be sufficient explanation. Lower extremity compression hose which he had prior to his peripheral angioplasty resumed as of 5/15 morning.   2.  Urinary retention  Chronic urethral catheter although he has planned a virtual  urology visit on Monday to reevaluate this.  In fact this was evaluated while he was at [facility] and it appears finasteride and tamsulosin were begun then.  He also describes difficulty in urination without any sensation of need to urinate.  Prior to that he had some urinary incontinence.  Possibly if urinary difficulties represent autonomic dysfunction this issue could be associated with his hypotension.  As above finasteride and tamsulosin beginning 5/17 and 5/16 respectively.     2.  Rule out COVID-19.    5/15 COVID PCR returned nonreactive.  Transferred from PI unit.  He was appropriately evaluated given his recent hospitalization [facility] and nursing home residence.    3.  History of heart failure with preserved ejection fraction.   Volume overload  Predominantly acute hypoxemic respiratory failure   Volume overload developed 5/16 p.m.  Still requires relatively high flow oxygen although some improvement.  Doubt lower respiratory tract infection but preemptive antibiotics reasonable.  Good response to relatively low doses of intravenous furosemide.  Continue cautious diuresis.  As above I concern for overdiuresis given orthostatic hypotension.  No evidence of acute ischemia.  Evaluated just recently at [facility] with findings of preserved systolic function.    4.  Chronic obstructive pulmonary disease.    Short acting bronchodilators throughout his hospital course.  Have resumed his long-acting agents as well.  No evidence of any exacerbation.  His CT shows some increased interstitial markings and alveolar decreased attenuation however doubt infection.  Other inflammation also unlikely but could be considered.  Clinically this does not represent a lower respiratory tract infection.  5.  Peripheral vascular disease.    He just underwent a left internal and external iliac stenting.  No signs of any ischemia.    6.  Generalized weakness and gait instability  Greatly complicated by his orthostatic  hypotension.  Continued physical therapy and Occupational Therapy to the extent this is possible once his orthostatic hypotension is improved.    Extensive discussion with him this morning for over 45 minutes regarding his course and prognosis.  Discussed with him issues and treatment of orthostatic hypotension and competing issues of physical and other rehabilitation therapy being limited by hypotension.  Discussed issues of treatment of his urinary retention versus the at least moderate suspicion of medications directed at treating prostate disease being associated with hypotension.  He expresses understanding of this.  He is quite frustrated with his prolonged course in the past month or 2 indicating that his greatest desire is to return home and in fact that is the only reason he is willing to accept nursing home placement to allow rehabilitation and improve functional status.  He acknowledges that unless he is able to walk he would not be able to manage at home.  Readdressed issues of advanced directives he does not believe a change in his desire for resuscitation is indicated now  he will continue to consider these issues.  He volunteers that he understands he has multiple medical comorbidities and that his life expectancy is limited but he believes it still a reasonable goal for him to plan rehabilitation with a goal of returning home at least for a limited period of time.    Active Problems:    Episode of syncope    Peripheral vascular disease (H)    Syncope    Acute on chronic respiratory failure with hypoxemia (H)       DVT Prophylaxis: Encourage walking, limited by hypotension  Code Status: Full Code    Disposition: Expected discharge depends on his course.  Thus far his length of stay has increased given multiple complications from his treatment so far.  He requires better control of his standing blood pressures before consideration can be given to discharge.    Jed Alfonso    Interval History  "  Hypoxemic respiratory failure developing 5/16 p.m. with evidence of volume overload.  This morning awake alert and comfortable.  He recalls no chest pain or pressure.  Little dyspnea although he does recall upper airway \"congestion.\"    -Data reviewed today: I reviewed all new labs and imaging results over the last 24 hours.       Peripheral IV 05/16/20 Left Upper forearm (Active)   Site Assessment WDL 05/17/20 0740   Line Status Infusing 05/17/20 0740   Phlebitis Scale 0-->no symptoms 05/17/20 0740   Infiltration Scale 0 05/17/20 0740   Number of days: 1       Urethral Catheter (Active)   Tube Description Positional 05/17/20 0000   Catheter Care Done 05/17/20 0000   Collection Container Standard 05/17/20 0745   Securement Method Securing device (Describe) 05/17/20 0745   Rationale for Continued Use Retention 05/17/20 0745   Urine Output 500 mL 05/17/20 0701   Number of days: 6     Line/device assessment(s) completed for medical necessity May 17  Physical Exam   Temp: 97.7  F (36.5  C) Temp src: Temporal BP: 144/54 Pulse: 79 Heart Rate: 71 Resp: 18 SpO2: 92 % O2 Device: High Flow Nasal Cannula (HFNC) Oxygen Delivery: 8 LPM  Vitals:    05/17/20 0551   Weight: 95.5 kg (210 lb 8.6 oz)     Vital Signs with Ranges  Temp:  [97.7  F (36.5  C)-98  F (36.7  C)] 97.7  F (36.5  C)  Pulse:  [58-79] 79  Heart Rate:  [58-88] 71  Resp:  [18-20] 18  BP: ()/(37-70) 144/54  SpO2:  [83 %-93 %] 92 %  I/O last 3 completed shifts:  In: 2842 [P.O.:840; I.V.:2002]  Out: 2500 [Urine:2500]    Awake, alert, interactive man sitting in bed on medical wards.  HEENT: Pupils equal, conjugate. No icterus or nystagmus. Oral mucosa moist. No facial asymmetry.   Neck: Supple, jugular veins not elevated. Trachea midline   Chest: No chest wall movement asymmetry. Aeration minimally diminished globally. Accessory muscles not in use. Expiratory time not increased. No tidal wheezes.  Scattered basilar rhonchi.  Few basilar crackles  Cardiac: PMI " not displaced. S1, S2 unremarkable. No S3, S4. P2 not accentuated. No murmurs..   Abdomen: Soft. No palpation or percussion tenderness. No distention. Normoactive bowel sounds. Liver and spleen not increased in size. No bruits, masses, or pulsations.   Extremities: No significant lower extremity edema.  Compression hose in place bilaterally.  Peripheral pulses diminished but easily palpable.  No eccymoses, clubbing warm distally.   Neurologic: Mental state above.  Mild right lower extremity and less pronounced upper extremity paresis, unknown deficit. Tone preserved. No fasiculations or tremors.    Medications       aspirin  162 mg Oral Daily     atorvastatin  80 mg Oral QPM     budesonide-formoterol  2 puff Inhalation BID     clopidogrel  75 mg Oral Daily     ipratropium - albuterol 0.5 mg/2.5 mg/3 mL  3 mL Nebulization Once     metoprolol succinate ER  12.5 mg Oral Daily     midodrine  12.5 mg Oral TID     pantoprazole  20 mg Oral QAM AC     potassium chloride ER  20 mEq Oral Daily     QUEtiapine  25 mg Oral At Bedtime     sodium chloride (PF)  3 mL Intracatheter Q8H     umeclidinium  1 puff Inhalation Daily           Data   Recent Labs   Lab 05/17/20  1155 05/17/20  0450 05/15/20  0511 05/14/20  1608 05/14/20  1004   WBC  --  9.0 7.6  --  7.7   HGB  --  10.0* 10.3*  --  10.6*   MCV  --  82 82  --  81   PLT  --  135* 144*  --  158   INR  --   --   --   --  1.12   NA  --  140 141  --  140   POTASSIUM  --  3.6 3.7  --  3.8   CHLORIDE  --  112* 109  --  108   CO2  --  26 29  --  28   BUN  --  22 22  --  23   CR  --  0.78 0.85  --  0.91   ANIONGAP  --  2* 3  --  4   CLARA  --  7.7* 7.9*  --  8.2*   GLC  --  112* 103*  --  114*   ALBUMIN  --  1.2* 1.2*  --  1.4*   PROTTOTAL  --  5.2* 5.2*  --  5.7*   BILITOTAL  --  0.2 0.3  --  0.3   ALKPHOS  --  72 66  --  74   ALT  --  13 11  --  13   AST  --  15 15  --  17   TROPI 0.055* 0.069*  --  0.057* 0.074*       Lactic Acid   Date Value Ref Range Status   03/13/2020 0.9 0.7 -  2.0 mmol/L Final       Recent Results (from the past 24 hour(s))   XR Chest Port 1 View    Narrative    PROCEDURE:  XR CHEST PORT 1 VW    HISTORY:  resp distress.     COMPARISON:  None.    FINDINGS:   The heart is enlarged. Postoperative changes are seen in the  mediastinum. Widespread interstitial opacities with areas of  confluence are noted. These widespread opacities have worsened as  compared to previous examination of May 8, 2020 No pleural effusion or  pneumothorax.      Impression    IMPRESSION:  Significant worsening in bilateral predominantly  interstitial opacities from May 8, 2020      FRANCHESKA PLEITEZ MD

## 2020-05-17 NOTE — PROVIDER NOTIFICATION
Notified MD of pt's O2 needs increasing to 6L to maintain 90% and lungs with crackles. MD at bedside and placed orders for xray, NEB, and fluids discontinued.

## 2020-05-17 NOTE — PROGRESS NOTES
"Called by nursing that increased O2 demand  Chart reviewed   C/o \"gunk in chest\"  Denies cp, mild dyspnea  rrr s1s2  Bs=bilat diffusely coarse with rales 1/2 way up back    Portable chest with bilat pulm edema, blunting cardiac silhouette  Will give lasix 40IV, methylprednisolone 125 iv, duoneb x1, guafenesin dm  Check labs including blood cx, cbc, cmp, ferritin, repeat d-dimer, crp, procalcitonin, trop, ldh , bnp, sputum cx  Give dose zosyn, vanco x1 while labs pending  "

## 2020-05-17 NOTE — PLAN OF CARE
Patient has denied pain this shift. He does get dyspneic with exertion. Recovers within a couple minutes. /61 and 148/70. BP taken when he stood from sitting at edge of bed, /44. He denied feeling dizzy or lightheaded at the time. VSS otherwise. O2 92-93% @ 4 LPM, O2 decreased to 3.5 LPM. Lung sounds had scattered rhonchi that did move when he coughed, crackles in bilateral bases. Patient has been coughing off and on, he did ask for suction when he was coughing and could not expectorate sputum from the back of his throat. MD was updated and order for flutter valve was obtained. Patient has been encouraged to deep breathe and cough frequently as able. Oral intake adequate. Clifton catheter output 525 ml. No BM. Remains assist of 1 with walker and gait belt for short pivot transfers, otherwise 2 if he's going further. Alarms in place. Call light within reach. IVF continues.    Face to face report given with opportunity to observe patient.    Report given to Khadijah PEREZ.    Meka Burnett RN   5/16/2020  11:40 PM

## 2020-05-17 NOTE — PLAN OF CARE
Vitals: Pt on 3L chronically. At beginning of shift pt was at 3.5L, currently at 9L to keep sats above 88%. MD aware.      Pain: Denied     Orientation: A&O. No reporting of dizziness or light headed.      Cardiac: Irr. Tele monitoring reading Sinus Dysrhythmia in 60's with BBB and frequent PVC's per ICU.      Lungs: Crackles and coarse with ex wheezing in RLL. Stat DUO NEB ordered.  Frequent, wet cough. Pt denies SOB. ABD breathing noted. Pink sputum. Sent to lab.      ABD: Bowels Active.     Urinating: Chronic trinh in for retention. Adequate amount out. IV lasix administered 1200 out from beginning of shift to present.      Skin: Bruised.      IV fluids: SL this shift.      Antibiotics: Once time dose of Zosyn administered.      Mobility: Up with assist of 1. Gait belt and walker used.      Safety: Bed alarm on. Call light in reach. Rounding done.     Comment: Slept on and off throughout night.     Face to face report given with opportunity to observe patient.    Report given to Khadijah Blulock RN   5/17/2020

## 2020-05-17 NOTE — PLAN OF CARE
Denies pain.  Crackles to Left base.  Denies dyspnea.  Oxygen 9 liters down to 8 liters.  Sats 92-94%.  NPC.  BP has remained 144/54 to 158/65.  Lasix given at noon.  Has remained in bed this shift.  No syncopal episodes today.     Face to face report given with opportunity to observe patient.    Report given to Meka Hurst RN   5/17/2020  3:19 PM

## 2020-05-18 NOTE — PLAN OF CARE
Pt A&O, Ortho BP positive and reported to MD.  Pt dizzy and lightheaded.  Infused 200 ml Albumen.  Chest xray done this a.m, see results, Zosyn started Q6H.  On 3 LPM o2 chronically and at baseline now, SATS 91-93%.  Lung dim with some crackles on right, using IS and inhalers as scheduled.  Has productive cough with bldy sputum noted by RT No c/o SOB.  Gave 2 G Magnesium once.  Continuing with Mitodrine as scheduled.  Ortho BP improved as charted and reported to MD.  No c/o dizziness or lightheadedness this afternoon.  On tele and is SR 60's with freq PACS per ICU tele report.  Metoprolol held then d/c'd today.  Call light in reach and alarms on in bed.    Face to face report given with opportunity to observe patient.    Report given to Meka Stout RN   5/18/2020  3:50 PM

## 2020-05-18 NOTE — PLAN OF CARE
Patient has denied pain this shift. He has denied respiratory difficulty. VSS, afebrile. O2 has been weaned from 8 LPM via HFNC down to 3 LPM via HFNC. Lung sounds have scattered fine crackles throughout but sound better than they did yesterday evening. Patient has been encouraged to deep breathe and cough frequently as able. Additional Lasix IV given. Oral intake adequate. Clifton output 1,225 ml. No BM. Patient has been in bed all shift, repositioning himself. Alarms in place. Call light within reach. IV SL.     Face to face report given with opportunity to observe patient.    Report given to Khadijah PEREZ.    Meka Burnett RN   5/17/2020  11:27 PM

## 2020-05-18 NOTE — PHARMACY
[facility]    Pharmacy      Antimicrobial Stewardship Note     Current antimicrobial therapy:  Anti-infectives (From now, onward)    Start     Dose/Rate Route Frequency Ordered Stop    05/18/20 1100  piperacillin-tazobactam (ZOSYN) infusion 3.375 g      3.375 g  over 30 Minutes Intravenous EVERY 6 HOURS 05/18/20 1029            Indication: Aspiration Pneumonia    Days of Therapy: 1     Pertinent labs:  Creatinine   Creatinine   Date Value Ref Range Status   05/18/2020 0.91 0.66 - 1.25 mg/dL Final   05/17/2020 0.78 0.66 - 1.25 mg/dL Final   05/15/2020 0.85 0.66 - 1.25 mg/dL Final     WBC   WBC   Date Value Ref Range Status   05/18/2020 10.7 4.0 - 11.0 10e9/L Final   05/17/2020 9.0 4.0 - 11.0 10e9/L Final   05/15/2020 7.6 4.0 - 11.0 10e9/L Final     Procalcitonin   Procalcitonin   Date Value Ref Range Status   05/17/2020 0.07 ng/ml Final     Comment:     0.05-0.24 ng/ml Low risk of systemic bacterial infection. Local bacterial   infection possible.  Recommendation: Assess other clinical features of   infection. Discourage antibiotics unless strong clinical suspicion for serious   infection.       CRP   CRP Inflammation   Date Value Ref Range Status   05/17/2020 11.6 (H) 0.0 - 8.0 mg/L Final   01/31/2015 <2.9 0.0 - 8.0 mg/L Final       Culture Results:   (5/17/20) Sputum  (5/17/20) Gram Stain = GPC  (5/17/20) Blood  (5/14/20) COVID = negative     Recommendations/Interventions:  1. None    Aguilar Garcia, [facility]  May 18, 2020

## 2020-05-18 NOTE — PLAN OF CARE
Vitals: Pt on 3L chronically, O2 maintaining around 90%. Pt refused to wear the continues pulse ox due to dinging when he would move. Educated him on why it should stay on, however, pt still refused. Will continue to monitor.      Pain: Denied     Orientation: A&O. No reporting of dizziness or light headed.      Cardiac: Irr. Tele monitoring reading Sinus Dysrhythmia in 60's with first AV block and BBB and occasional per ICU.      Lungs: Crackles in bases.      ABD: Bowels Active.     Urinating: Chronic trinh in for retention. Adequate amount out.     Skin: Bruised.      IV fluids: SL.      Antibiotics: None.     Mobility: Up with assist of 1. Gait belt and walker used.      Safety: Bed alarm on. Call light in reach. Rounding done.     Comment: Slept well last night.     Face to face report given with opportunity to observe patient.    Report given to Tate Bullock RN   5/18/2020

## 2020-05-18 NOTE — PROGRESS NOTES
[facility]    Medicine Progress Note - Hospitalist Service       Date of Admission:  5/14/2020  Assessment & Plan   Lamont Doran is a 74 year old  man who was admitted on 5/14/2020.  He has been at [facility] for rehabilitation.  While there, during a physical therapy session while he was standing up he had several episodes of syncope.    He has had recurrent episodes of syncope, felt to be orthostatic in nature.  He was recently evaluated extensively at [facility] 3/14-19.  Echocardiogram showed normal systolic function without evident structural abnormality.  CTPA, showing no pulmonary embolism.  MRI of the brain was negative.  Telemetry showed sinus with PACs.    He did have some hemoptysis while he was there for which he  underwent bronchoscopy that showed alveolar hemorrhage, and polymicrobial sputum was noted and vasculitis workup was neg.    Subsequently he underwent a surgical procedure at [facility] on 05/11 with a left common and left external iliac angioplasty and stents placed by Dr. George.    Apparently lower extremity compression hose were not resumed.  He is always somewhat dyspneic because he does have COPD.  able to walk short distances with PT with a wheelchair behind him.     1.  Syncopal episode.    Remains with severe orthostatic hypotension.    Tele c/w PAC's  Currently on max dose miodrine  Will discontinue metoprolol  He needs some lasix as otherwise he gets severely volume overloaded as demonstarted by cxray and increased o2 demands on am 5/17  tamsulosin and finasteride have been placed on hold in case effecting orthostasis  contr compression hose  Rehydrate today with albumin    2/ acute on chronic hypoxic resp failure on am 5/17 from volume overload  Will need some lasix  On 3liters at home  Will add on abx, as acute alveolar process  .   3.  Urinary retention  Chronic urethral catheter although he had a planned  virtual urology visit today.     4   History of heart failure with preserved ejection fraction.   Predominantly acute hypoxemic respiratory failure, will need to find the right dose of lasix to not cause volume overload while not causing syncope     5.  Chronic obstructive pulmonary disease with exacerbation, and possible lower tract disease, will add on zosyn pending sputum cx  Maintain on steroids  His CT shows some increased interstitial markings and alveolar opacicification.    Cont laba/lama/IS    6.  Peripheral vascular disease.  cad pci 2013, bilat cea  He just underwent a left internal and external iliac stenting.  No signs of any ischemia.    Cont asa, statin, plavix    7.  Generalized weakness and gait instability  Greatly complicated by his orthostatic hypotension.  Continued physical therapy and Occupational Therapy to the extent this is possible once his orthostatic hypotension is improved.    8 recent hemptysis with bronch showing alveaolar hemorrhage, had negative vasculitis workup, repeat vasculitis workup    9 electrolyte derangement replace potassium, magnesium    10 gerd on protonix    11 troponinemia, likely from demand ischemia from volume overloaded status    12 severe p[rot calorie malnutrition with 3rd spacing which may lead to intravascular volume depletion, will give albumin today    13 normocytic anemia, check iron studies, b12  DVT Prophylaxis: Encourage walking, limited by hypotension     Diet: Regular Diet Adult    Clifton Catheter: in place, indication: Retention  Code Status: Full Code           Disposition Plan  his greatest desire is to return home and in fact that is the only reason he is willing to accept nursing home placement to allow rehabilitation and improve functional status.  He acknowledges that unless he is able to walk he would not be able to manage at home.  He requires better control of his standing blood pressures before consideration can be given to discharge.Expected discharge: 2 - 3 days, recommended to  prior living arrangement once blood pressure within normal limits on standing.  Entered: Guido Mcocllum MD 05/18/2020, 9:59 AM       The patient's care was discussed with the Patient.    Guido Mccollum MD  Hospitalist Service  [facility]    ______________________________________________________________________    Interval History   States breathing easier  Very lightheaded   Denies cp, nausea, vomiting, fevers, chills, change in BM's  10 poiunt ros neg   Data reviewed today: I reviewed all medications, new labs and imaging results over the last 24 hours. I personally reviewed the chest x-ray image(s) showing a.    Physical Exam   Vital Signs: Temp: 98  F (36.7  C) Temp src: Tympanic BP: 139/69 Pulse: 69 Heart Rate: 61 Resp: 20 SpO2: (!) 91 % O2 Device: Nasal cannula Oxygen Delivery: 3 LPM  Weight: 205 lbs 11.03 oz  Constitutional: awake, alert, cooperative, no apparent distress, and appears stated age  Respiratory: No increased work of breathing, good air exchange, clear to auscultation bilaterally, no crackles or wheezing  Cardiovascular: Normal apical impulse, regular rate and rhythm, normal S1 and S2, no S3 or S4, and no murmur noted  GI: No scars, normal bowel sounds, soft, non-distended, non-tender, no masses palpated, no hepatosplenomegally  Skin: no bruising or bleeding  Musculoskeletal: There is no redness, warmth, or swelling of the joints.  Full range of motion noted.  Motor strength is 5 out of 5 all extremities bilaterally.  Tone is normal.  Neurologic: Awake, alert, oriented to name, place and time.  Cranial nerves II-XII are grossly intact.  Motor is 5 out of 5 bilaterally.  Cerebellar finger to nose, heel to shin intact.  Sensory is intact.  Babinski down going, Romberg negative, and gait is normal.    Data   Recent Labs   Lab 05/18/20  0518 05/17/20  1155 05/17/20  0450 05/15/20  0511 05/14/20  1608 05/14/20  1004   WBC 10.7  --  9.0 7.6  --  7.7   HGB 10.2*  --  10.0* 10.3*  --  10.6*    MCV 80  --  82 82  --  81   *  --  135* 144*  --  158   INR  --   --   --   --   --  1.12     --  140 141  --  140   POTASSIUM 3.5  --  3.6 3.7  --  3.8   CHLORIDE 107  --  112* 109  --  108   CO2 30  --  26 29  --  28   BUN 26  --  22 22  --  23   CR 0.91  --  0.78 0.85  --  0.91   ANIONGAP 3  --  2* 3  --  4   CLARA 8.3*  --  7.7* 7.9*  --  8.2*   *  --  112* 103*  --  114*   ALBUMIN 1.2*  --  1.2* 1.2*  --  1.4*   PROTTOTAL 5.5*  --  5.2* 5.2*  --  5.7*   BILITOTAL 0.2  --  0.2 0.3  --  0.3   ALKPHOS 72  --  72 66  --  74   ALT 13  --  13 11  --  13   AST 12  --  15 15  --  17   TROPI  --  0.055* 0.069*  --  0.057* 0.074*

## 2020-05-19 NOTE — PROGRESS NOTES
[facility]    Medicine Progress Note - Hospitalist Service       Date of Admission:  5/14/2020  Assessment & Plan   Lamont Doran is a 74 year old  man who was admitted on 5/14/2020.  He has been at [facility] for rehabilitation.  While there, he had several episodes of syncope  felt to be orthostatic in nature.  He was recently evaluated extensively at [facility] 3/14-19.  Echocardiogram showed normal systolic function without evident structural abnormality.  CTPA, showing no pulmonary embolism.  MRI of the brain was negative.  Telemetry showed sinus with PACs.    He did have some hemoptysis while he was there for which he  underwent bronchoscopy that showed alveolar hemorrhage, and polymicrobial sputum was noted and vasculitis workup was neg.    Subsequently he underwent a surgical procedure at [facility] on 05/11 with a left common and left external iliac angioplasty and stents placed by Dr. George.    Apparently lower extremity compression hose were not resumed.  He is always somewhat dyspneic because he does have COPD.  able to walk short distances with PT with a wheelchair behind him.     1.  Syncopal episode.    Remains with severe orthostatic hypotension.    Tele c/w PAC's  Currently on max dose miodrine  Will discontinue metoprolol  He needs some lasix as otherwise he gets severely volume overloaded as demonstarted by cxray and increased o2 demands on am 5/17  tamsulosin and finasteride have been placed on hold in case effecting orthostasis  contr compression hose  Rehydrate today again with albumin    2/ acute on chronic hypoxic resp failure on am 5/17 from volume overload  Will need some lasix, plan on giving IV dose today after albumin  On 3liters at home  Will add on abx, as acute alveolar process  .   3.  Urinary retention  Chronic urethral catheter will need to reschedule with urology  proscar and tamsulosin discontinued in case contributing to orthostasis    4   History of heart failure with preserved ejection fraction.   Predominantly acute hypoxemic respiratory failure, will need to find the right dose of lasix to not cause volume overload while not causing syncope     5.  Chronic obstructive pulmonary disease with exacerbation, and possible lower tract disease, will add on zosyn, g, pos cocci switch to doxycycliner  Maintain on steroids  His CT shows some increased interstitial markings and alveolar opacicification.    Cont laba/lama/IS    6.  Peripheral vascular disease.  cad pci 2013, bilat cea  He just underwent a left internal and external iliac stenting.  No signs of any ischemia.    Cont asa, statin, plavix    7.  Generalized weakness and gait instability  Greatly complicated by his orthostatic hypotension.  Continued physical therapy and Occupational Therapy to the extent this is possible once his orthostatic hypotension is improved.    8 recent hemptysis with bronch showing alveaolar hemorrhage, had negative vasculitis workup, repeat vasculitis workup as still with minimal hemoptysis    9 electrolyte derangement replace potassium, magnesium    10 gerd on protonix    11 troponinemia, likely from demand ischemia from volume overloaded status    12 severe p[ot calorie malnutrition with 3rd spacing which may lead to intravascular volume depletion, will give albumin today    13 normocytic anemia, check iron studies--ok, b12  DVT Prophylaxis: Encourage walking, limited by hypotension       Diet: Regular Diet Adult    DVT Prophylaxis: Pneumatic Compression Devices  Clifton Catheter: in place, indication: Retention  Code Status: Full Code  Dnr/dni now         Disposition Plan   Expected discharge: 2 - 3 days, recommended to transitional care unit once stable orthostatic BP's.  Entered: Guido Mccollum MD 05/19/2020, 8:56 AM       The patient's care was discussed with the Patient.    Guido Mccollum MD  Hospitalist Service  [facility] [place]  [facility]    ______________________________________________________________________    Interval History   States breathing ok, minimal cough some hemoptysis. Denies chest pain.  Disd get lightheaded with tanding this qam.  Tolerating PO.  No further syncope. Denies nausea, and vomiting, fevers or chills ros neg on 10 point system review except as above    Data reviewed today: I reviewed all medications, new labs and imaging results over the last 24 hours. I personally reviewed no images or EKG's today.    Physical Exam   Vital Signs: Temp: 96.6  F (35.9  C) Temp src: Tympanic BP: 140/70   Heart Rate: 60 Resp: 20 SpO2: (!) 89 % O2 Device: Nasal cannula with humidification Oxygen Delivery: 4 LPM  Weight: 208 lbs 15.94 oz  Respiratory: No increased work of breathing, good air exchange, clear to auscultation bilaterally, pos crackles 1/3 fran up back or wheezing  Cardiovascular: Normal apical impulse, regular rate and rhythm, normal S1 and S2, no S3 or S4, and no murmur noted  GI: No scars, normal bowel sounds, soft, non-distended, non-tender, no masses palpated, no hepatosplenomegally  Skin: no bruising or bleeding  Neurologic: Awake, alert, oriented to name, place and time.  Cranial nerves II-XII are grossly intact.  Motor is 5 out of 5 bilaterally.  Cerebellar finger to nose, heel to shin intact.  Sensory is intact.  Babinski down going, Romberg negative, and gait is normal.    Data   Recent Labs   Lab 05/19/20  0514 05/18/20  0518 05/17/20  1155 05/17/20  0450  05/14/20  1608 05/14/20  1004   WBC 9.6 10.7  --  9.0   < >  --  7.7   HGB 10.0* 10.2*  --  10.0*   < >  --  10.6*   MCV 81 80  --  82   < >  --  81   * 148*  --  135*   < >  --  158   INR  --   --   --   --   --   --  1.12    140  --  140   < >  --  140   POTASSIUM 3.7 3.5  --  3.6   < >  --  3.8   CHLORIDE 109 107  --  112*   < >  --  108   CO2 30 30  --  26   < >  --  28   BUN 26 26  --  22   < >  --  23   CR 1.04 0.91  --  0.78   < >  --  0.91    ANIONGAP 3 3  --  2*   < >  --  4   CLARA 8.1* 8.3*  --  7.7*   < >  --  8.2*   GLC 96 119*  --  112*   < >  --  114*   ALBUMIN 1.7* 1.2*  --  1.2*   < >  --  1.4*   PROTTOTAL 5.4* 5.5*  --  5.2*   < >  --  5.7*   BILITOTAL 0.3 0.2  --  0.2   < >  --  0.3   ALKPHOS 64 72  --  72   < >  --  74   ALT 14 13  --  13   < >  --  13   AST 12 12  --  15   < >  --  17   TROPI  --   --  0.055* 0.069*  --  0.057* 0.074*    < > = values in this interval not displayed.

## 2020-05-19 NOTE — PHARMACY
[facility]    Pharmacy      Antimicrobial Stewardship Note     Current antimicrobial therapy:  Anti-infectives (From now, onward)    Start     Dose/Rate Route Frequency Ordered Stop    05/19/20 1000  doxycycline hyclate (VIBRA-TABS) tablet 100 mg      100 mg Oral EVERY 12 HOURS SCHEDULED 05/19/20 0910            Indication: CAP    Days of Therapy: 1 (day 3 of antibiotics, received 2 days of Zosyn)      Pertinent labs:  Creatinine   Creatinine   Date Value Ref Range Status   05/19/2020 1.04 0.66 - 1.25 mg/dL Final   05/18/2020 0.91 0.66 - 1.25 mg/dL Final   05/17/2020 0.78 0.66 - 1.25 mg/dL Final     WBC   WBC   Date Value Ref Range Status   05/19/2020 9.6 4.0 - 11.0 10e9/L Final   05/18/2020 10.7 4.0 - 11.0 10e9/L Final   05/17/2020 9.0 4.0 - 11.0 10e9/L Final     Procalcitonin   Procalcitonin   Date Value Ref Range Status   05/17/2020 0.07 ng/ml Final     Comment:     0.05-0.24 ng/ml Low risk of systemic bacterial infection. Local bacterial   infection possible.  Recommendation: Assess other clinical features of   infection. Discourage antibiotics unless strong clinical suspicion for serious   infection.       CRP   CRP Inflammation   Date Value Ref Range Status   05/17/2020 11.6 (H) 0.0 - 8.0 mg/L Final   01/31/2015 <2.9 0.0 - 8.0 mg/L Final       Culture Results:   5/17: Sputum culture: moderate growth yeast  5/17: Sputum gram stain: many gram positive cocci, moderate gram positive rods  5/17: Blood culture: no growth x 2     Recommendations/Interventions:  1. Provider did not want to add cephalosporin due to sputum culture findings. No recommendations at this time. Will continue to monitor.     Ruby Lee NEFTALY  May 19, 2020

## 2020-05-19 NOTE — PLAN OF CARE
Reason for hospital stay:  episode of syncope  Most recent vitals: /63   Pulse 69   Temp 97.6  F (36.4  C) (Tympanic)   Resp 20   Ht 1.829 m (6')   Wt 93.3 kg (205 lb 11 oz)   SpO2 (!) 89%   BMI 27.90 kg/m      Pain Management:  pt denied having any pain, will continue to assess.   LOC:  A&OX4  Cardiac:  Denied dizziness while resting in bed, when standing pt reported feeling dizzy  Respiratory:  Fine crackles heard throughout, pt on 3L of oxygen and is maintaining SaO2 above 89% at beginning of this shift but needed to increase to 4L to maintain O2, pt has a frequent productive cough with white and blood streaked sputum which is not a new occurrence. Intermittent SOB noted, offered PRN neb treatment but pt declined.   GI:  Bowels active and audible in all four quadrants  :  Clifton catheter in place for urinary retention  Skin Issues:  Dressing to upper left leg, remains in place, scattered bruising     IVF:  none  ABX:  Zosyn 3.375 g every 6 hours     Nutrition: regular diet   ADL's:  Assist of 2  Safety:  Call light within reach, frequent rounding, bed alarms on, non-slip socks on    Attempted to obtain orthos, laying /53 P 76, sitting 115/47 P 80, pt then attempted to stand but began feeling dizzy before BP could be obtained standing, once pt was sitting again blood pressure rechecked 90/41 pulse 79      Face to face report given with opportunity to observe patient.    Report given to Meseret Garcia RN   5/19/2020  7:18 AM

## 2020-05-19 NOTE — PLAN OF CARE
Discharge Planner OT   Patient plan for discharge: Return to [facility] for ongoing rehab  Current status: Bed Mobility: IND sup to sit  Transfer Skills: Min-ModA sit to stand  Toilet Transfer: Pt too fatigued after ambulating in hallway to demonstrate   Lower Body Dressing: unable to assess today due to fatigue  Toileting: urinary catheter present, needs to follow up with urology   Eating/Self Feeding: set up  Barriers to return to prior living situation: fall risk, weakness, deconditioning  Recommendations for discharge: Return to [facility] for ongoing short term rehab  Rationale for recommendations: Due to pt's current functional status, he would not be safe to return home       Entered by: Pamela Thompson 05/19/2020 1:54 PM

## 2020-05-19 NOTE — PROGRESS NOTES
Inpatient Occupational Therapy Evaluation    Name: Lamont Doran MRN# [id number]   Age: 74 year old YOB: 1945     Date of Consultation: May 19, 2020  Consultation is requested by: Dr. Mccollum   Specific Consultation Request: Came from rehab center with advanced COPD and orthostatic hypotension   Primary care provider: Jordin Aleman    General Information:   Onset Date: 2020    History of Current Problem/Admission: Orthostatic hypotension [I95.1]  NSTEMI (non-ST elevated myocardial infarction) (H) [I21.4]    Prior Level of Function: Pt has been at [facility] for short term rehab the past few weeks since a procedure in early May. Pt has been ambulating 100-200 ft with a walker and w/c to follow. He has had a catheter and occasionally requires assistance from staff at [facility] when he needs to use the toilet and with dressing (especially wilfrid socks).   Prior to surgery, pt reports being independent in ADLs.   Ambulation: 1 - Assistive Equipment   Transferrin - Assistive Equipment   Toiletin - Independent    Bathin - Independent   Dressin - Independent   Eatin - Independent   Communication: 0 - Understands/communicates without difficulty  Swallowin - swallows foods/liquids without difficulty  Cognition: 0 - no cognitive issues reported    Fall history within the last 6 months: Yes    Current Living Situation: Pt has been residing at [facility] for short term rehab. Prior, he was living in a house with his wife and ~4 steps to enter.     Current Equipment Used at Home: Walker, oxygen    Patient & Family Goals: Get stronger     Past Medical History:   No past medical history on file.    Past Surgical History:  No past surgical history on file.    Medications:   Current Facility-Administered Medications   Medication     acetaminophen (TYLENOL) Suppository 650 mg     acetaminophen (TYLENOL) tablet 650 mg     aspirin EC tablet 162 mg     atorvastatin (LIPITOR) tablet 80 mg      budesonide-formoterol (SYMBICORT) 160-4.5 MCG/ACT Inhaler 2 puff     clopidogrel (PLAVIX) tablet 75 mg     doxycycline hyclate (VIBRA-TABS) tablet 100 mg     guaiFENesin-dextromethorphan (ROBITUSSIN DM) 100-10 MG/5ML syrup 5 mL     ipratropium - albuterol 0.5 mg/2.5 mg/3 mL (DUONEB) neb solution 3 mL     lidocaine (LMX4) kit     lidocaine 1 % 0.1-1 mL     melatonin tablet 1 mg     midodrine (PROAMATINE) tablet 12.5 mg     naloxone (NARCAN) injection 0.1-0.4 mg     nitroGLYcerin (NITROSTAT) sublingual tablet 0.4 mg     ondansetron (ZOFRAN-ODT) ODT tab 4 mg    Or     ondansetron (ZOFRAN) injection 4 mg     pantoprazole (PROTONIX) EC tablet 20 mg     potassium chloride ER (MICRO-K) CR capsule 20 mEq     QUEtiapine (SEROquel) tablet 25 mg     sodium chloride (PF) 0.9% PF flush 3 mL     sodium chloride (PF) 0.9% PF flush 3 mL     umeclidinium (INCRUSE ELLIPTA) 62.5 MCG/INH inhaler 1 puff       Weight Bearing Status: FW     Cognitive Status Examination:  Orientation: awake and alert  Level of Consciousness: alert  Follows Commands and Answers Questions: 100% of the time  Personal Safety and Judgement: Intact  Memory: Immediate recall intact    Pain:   Currently in pain? Did not voice any pain  Pain Location?     Occupational Therapy Evaluation:   Integumentary/Edema: no significant findings   Range of Motion: WFL although R shoulder limited due to a previous blood clot    Strength: R shoulder grossly 3-/5, L shoulder grossly 4/5, bilateral elbows grossly 4/5  Hand Strength: Bilateral gross grasp good (L>R)  Muscle Tone Assessment: no issues observed  Coordination: normal     Mobility:   Bed Mobility: IND sup to sit  Transfer Skills: Min-ModA sit to stand  Toilet Transfer: Pt too fatigued after ambulating in hallway to demonstrate   Balance: Fair with support from walker     ADLs:   Lower Body Dressing: unable to assess today due to fatigue  Toileting: urinary catheter present, needs to follow up with urology    Eating/Self Feeding: set up    IADLs:   Previous Responsibilities of the Patient: SNF currently managing  Comments: Due to pt wanting/needing to return to SNF, did not go into details about these tasks at home     Activities of Daily Living Analysis:   Impairments Contributing to Impaired Activities of Daily Living: strength decreased and activity tolerance decreased    Occupational Therapy Interventions: ADL Retraining , strengthening , progressive activity/exercise and risk factor education     Clinical Impressions:  Criteria for Skilled Therapeutic Intervention Met: Yes, treatment indicated  OT Diagnosis: Impaired ADLs  Influenced by the following impairments: weakness, deconditioning   Functional limitations due to impairment: Dressing, grooming, bathing, toileting, functional mobility   Clinical presentation: Stable/Uncomplicated  Clinical presentation rationale: Clinical judgement  Clinical Decision making (complexity): Low Complexity  Frequency: 5 times/week  Predicted Duration of Therapy Intervention (days/wks): 5 days    Anticipated Discharge Disposition: Transitional Care Facility   Anticipated Equipment Needs at Discharge: None  Risks and Benefits of therapy have been explained: Yes  Patient, Family & other staff in agreement with plan of care: Yes  Clinical Impression Comments: Pt currently presents with weakness and deconditioning which is affecting his ability to safely and independently complete ADLs. Pt will benefit from returning to short term rehab to continue working with PT/OT to progress his strength and endurance for increased independence in ADLs. Plan to treat pt during his hospital stay.     Total Eval Time: 15

## 2020-05-19 NOTE — PLAN OF CARE
Pt A&O, BP stable today.  Gave 200 ml Albumin and 20 mg IV lasix 30 mins after with output as charted.  Continue with Midodrine as scheduled.  IV antbtx d/c'd and started oral Doxycycline tosday.  Still continues to have productive cough and bloody sputum.  On 3 LPM O2 with SATS consistently at 91%.  PT and OT worked with pt today and is now up in chair with asst x1, gait belt and walker.  No s/s of dizziness or lightheadedness today.  Updated Daughter Jeimy this afternoon with pts permission.  Pt is now a DNR/DNI.  Clifton cath in place for retention.  Noah socks placed on pt this morning, can come off at night only per MD.  Alarms in use and call light in reach.  Makes needs known.   Got pt from chair to bed for US of right arm in room at this time.   Tele on pt, SR 80's with 1sr degree AVB, PAC's and PVC's per ICU tele report.    Face to face report given with opportunity to observe patient.    Report given to Tony Stout RN   5/19/2020  3:20 PM

## 2020-05-19 NOTE — PROGRESS NOTES
Inpatient Physical Therapy Evaluation    Name: Lamont Doran MRN# [id number]   Age: 74 year old YOB: 1945     Date of Consultation: May 19, 2020  Consultation is requested by:  Dr. Mccollum  Specific Consultation Request:  darlene  Primary care provider: Jordin Aleman    General Information:   Onset Date: 20    History of Current Problem/Admission: Orthostatic hypotension [I95.1]  NSTEMI (non-ST elevated myocardial infarction) (H) [I21.4]    Prior Level of Function: Patient reports that he lives in a home with his wife. Has roughly 3-4 stairs to enter home with railings on both sides. Was just at [facility] for rehab prior to hospital admission  Ambulation: 1 - Assistive Equipment   Transferrin - Assistive Equipment   Toiletin - Assistive Equipment    Communication: 0 - Understands/communicates without difficulty      Fall history within the last 6 months: Yes    Current Living Situation: Patient has 4 stairs to enter the home. Patient has a railing on both sides.    Current Equipment Used at Home: FWW     Patient & Family Goals: Improve strength to allow safe return home     Past Medical History:   No past medical history on file.    Past Surgical History:  No past surgical history on file.    Medications:   Current Facility-Administered Medications   Medication     acetaminophen (TYLENOL) Suppository 650 mg     acetaminophen (TYLENOL) tablet 650 mg     aspirin EC tablet 162 mg     atorvastatin (LIPITOR) tablet 80 mg     budesonide-formoterol (SYMBICORT) 160-4.5 MCG/ACT Inhaler 2 puff     clopidogrel (PLAVIX) tablet 75 mg     doxycycline hyclate (VIBRA-TABS) tablet 100 mg     guaiFENesin-dextromethorphan (ROBITUSSIN DM) 100-10 MG/5ML syrup 5 mL     ipratropium - albuterol 0.5 mg/2.5 mg/3 mL (DUONEB) neb solution 3 mL     lidocaine (LMX4) kit     lidocaine 1 % 0.1-1 mL     melatonin tablet 1 mg     midodrine (PROAMATINE) tablet 12.5 mg     naloxone (NARCAN) injection 0.1-0.4 mg      nitroGLYcerin (NITROSTAT) sublingual tablet 0.4 mg     ondansetron (ZOFRAN-ODT) ODT tab 4 mg    Or     ondansetron (ZOFRAN) injection 4 mg     pantoprazole (PROTONIX) EC tablet 20 mg     potassium chloride ER (MICRO-K) CR capsule 20 mEq     QUEtiapine (SEROquel) tablet 25 mg     sodium chloride (PF) 0.9% PF flush 3 mL     sodium chloride (PF) 0.9% PF flush 3 mL     umeclidinium (INCRUSE ELLIPTA) 62.5 MCG/INH inhaler 1 puff       Weight Bearing Status: FWB     Cognitive Status Examination:  Orientation: awake and alert  Level of Consciousness: alert  Follows Commands and Answers Questions: 100% of the time  Personal Safety and Judgement: Intact  Memory: Immediate recall intact  Comments:     Pain:   Currently in pain? No    Physical Therapy Evaluation:   Integumentary/Edema: unremarkable  ROM: WNLs  Strength: 4/5 strength throughout LEs  Bed Mobility: SBA x 1  Transfers: min to modA x 1  Gait: [place] x 1 with close w/c follow  Stairs: NT      Physical Therapy Interventions: Gait Training  and Strengthening    Clinical Impressions:  Criteria for Skilled Therapeutic Intervention Met: Yes, treatment indicated  PT Diagnosis: LE weakness and deconditioning  Influenced by the following impairments: weakness, deconditioning  Functional limitations due to impairment: fall risk, difficulty performing functional movement such as sit to stands and transfers  Clinical presentation: Stable/Uncomplicated  Clinical presentation rationale: clinical judgement  Clinical Decision making (complexity): Low Complexity  Frequency: 6 times/week  Predicted Duration of Therapy Intervention (days/wks): 5 days  Anticipated Discharge Disposition: Transitional Care Facility   Anticipated Equipment Needs at Discharge:   Risks and Benefits of therapy have been explained: Yes  Patient, Family & other staff in agreement with plan of care: Yes  Clinical Impression Comments: Patient currently not safe to return home in current condition. Would benefit  from SNF placement to maximize strength and recovery and to allow the safest return home possible    Total Eval Time: 25

## 2020-05-19 NOTE — PLAN OF CARE
Discharge Planner PT   Patient plan for discharge: Return to [facility]  Current status: Patient in bed at start of therapy. Agreeable to therapy treatment. Patient min to modA x 1 for sit to stand with FWW. Patient ambulated roughly 75 feet x 3 trials with FWW close w/c follow on 4L O2. No LOB with ambulation or functional movement. Patient does display slight buckling of R LE with stance phase of gait. Instructions to keep w/c close for increased safety. Rest breaks needed for fatigue.  Barriers to return to prior living situation: weakness, deconditioning, fall risk  Recommendations for discharge: Recommend SNF placement- back to [facility] if possible  Rationale for recommendations: Patient currently not safe to return home in current condition. Would benefit from SNF placement to maximize strength and recovery and to allow the safest return home possible       Entered by: Sony Pagan 05/19/2020 1:35 PM

## 2020-05-19 NOTE — PLAN OF CARE
Patient has denied pain this shift. He has denied any respiratory difficulty. O2 sat has been 89-93% @ 3 LPM. RR 20. Lung sounds had fine crackles throughout. Infrequent jags of coughing that patient is expectorating blood tinged sputum, this is not new, he had this at [facility] per notes. BP stable while sitting up in bed or sitting up in wheelchair. I was not called when he wanted to get up to wheel chair to eat supper so I missed taking that orthostatic BP. Attempted to take patient's BP upon standing from sitting up in wheelchair for supper but could not get a reading. Patient was able to stand for a few minutes before he c/o feeling dizzy and needing to sit down. Upon sitting back down in wheelchair, BP was 78/35. He was able to stand and pivot to bed, talking and joking the whole time. Upon getting into a sat up position in bed, BP was normalized again. VSS otherwise. Telemetry in place. Oral intake adequate. Clifton catheter output 550 ml. No BM. Remains assist of 2 with gait belt and walker for safety. Alarms in place. Call light within reach. IV SL.    Face to face report given with opportunity to observe patient.    Report given to Hermilo PEREZ.    Meka Burnett RN   5/18/2020  11:29 PM

## 2020-05-20 NOTE — PLAN OF CARE
Pt A&O,  VS as charted.  BP significantly lower on right arm.  Had significant event this morning-see note.  Up to chair for lunch with asst x2, gaitbelt and walker, slow but stable.  Remains on 3 LPM O2 with SATS 90-92%, lungs had some fine crackles this morning but improved this afternoon.  Gave 300 mls Albumin this afternoon, tolerated well.  Clifton cath in place for retention-output as charted.  Tele in place, SR 50's per ICU verbal report.  Pt has good appetite, makes needs known.  Alarms in use and call light in reach.    Face to face report given with opportunity to observe patient.    Report given to Carmen Stout RN   5/20/2020  4:00 PM

## 2020-05-20 NOTE — PROGRESS NOTES
"CLINICAL NUTRITION SERVICES  -  ASSESSMENT NOTE    Lamont Doran : LOS    74 yom admitted for episode of syncope. Pt has a hx of GERD, peripheral vascular disease, COPD, hyperlipidemia, hypertension, colonic polyps and borderline hyperglycemia. Noted 18lb weight loss in the past 2 months. Intake/appetite has been good this admission and prior to admission.    Diet Order: Regular  Intake: % most meals    Height: 6' 0\"  Weight: 208 lbs 15.94 oz  Body mass index is 28.34 kg/m .  Weight Status:  Overweight BMI 25-29.9  IBW: 77.6  Weight History:   Wt Readings from Last 10 Encounters:   05/19/20 94.8 kg (208 lb 15.9 oz)   03/13/20 102.9 kg (226 lb 13.7 oz)   04/14/17 108.4 kg (239 lb)   01/31/15 101.6 kg (224 lb)   03/31/14 107 kg (236 lb)   03/05/14 107.5 kg (237 lb)   02/05/14 108.9 kg (240 lb)   01/09/14 109.3 kg (241 lb)     Estimated Energy Needs: 3085-2067 kcals ([place] Jeor- stress factor 1-1.2) - current weight 94.8kg  Estimated Protein Needs: 75-95g grams protein (1-1.2 g pro/Kg)- IBW 77.6    Malnutrition Diagnosis: Patient does not meet two of the criteria necessary for diagnosing malnutrition. Noted weight loss, pt on lasix at home and has been in short term rehab which may have increased his activity level. Intake has been good.    NUTRITION RECOMMENDATIONS  - Encourage intake as needed    MONITORING AND EVALUATION:  RD will monitor intake, weight, labs          "

## 2020-05-20 NOTE — PLAN OF CARE
Reason for hospital stay:  episode of syncope   Most recent vitals: /58   Pulse 69   Temp 97.5  F (36.4  C) (Tympanic)   Resp 20   Ht 1.829 m (6')   Wt 94.8 kg (208 lb 15.9 oz)   SpO2 (!) 90%   BMI 28.34 kg/m      Pain Management:  Pt denied having any pain, will continue to assess.   LOC:  A&OX4  Cardiac:  Pt on telemetry per ICU nurse SR 60s frequent PAC, occasional PVC  Respiratory:  fine crackles in LLL & RLL, clear in PAOLA, RUL, and RML, pt on 3L to maintain O2 above 89%, infrequent loose productive cough greenish, red sputum.    GI:  bowels active and audible in all four quadrants  :  trinh catheter in place for urinary retention, adequate output.   Skin Issues:  scattered bruises.     IVF:  none  ABX:  doxycycline every 12 hours    Nutrition: regular diet   ADL's:  assist of 2  Safety:  call light within reach, frequent rounding, non-slip socks on    Comments: Pt denied any episodes of feeling lightheaded or dizzy through the night.     Face to face report given with opportunity to observe patient.    Report given to Tate Garcia RN   5/20/2020  7:21 AM

## 2020-05-20 NOTE — PLAN OF CARE
A&O. VSS/AF 3L O2 91%. In frequent PC .Denies pain/nausea. Up to chair with assist of one with no c/o dizziness. SELVIN hose on. Clifton catheter for retention. Good appetite. Telemetry SR 60s with PAC/PVCs  Face to face report given with opportunity to observe patient.    Report given to Hermilo Cody RN   5/19/2020  11:40 PM

## 2020-05-20 NOTE — PROGRESS NOTES
[facility]    Medicine Progress Note - Hospitalist Service       Date of Admission:  5/14/2020  Assessment & Plan   Lamont Doran is a 74 year old  man who was admitted on 5/14/2020.  He has been at [facility] for rehabilitation.  While there, he had several episodes of syncope  felt to be orthostatic in nature.  He was recently evaluated extensively at [facility] 3/14-19.  Echocardiogram showed normal systolic function without evident structural abnormality.  CTPA, showing no pulmonary embolism.  MRI of the brain was negative.  Telemetry showed sinus with PACs.    He did have some hemoptysis while he was there for which he  underwent bronchoscopy that showed alveolar hemorrhage, and polymicrobial sputum was noted and vasculitis workup was neg.    Subsequently he underwent a surgical procedure at [facility] on 05/11 with a left common and left external iliac angioplasty and stents placed by Dr. George in hope that the improved blood flow would improve his ambulation.    Apparently lower extremity compression hose were not resumed.  He is always somewhat dyspneic because he does have COPD.  able to walk short distances with PT with a wheelchair behind him.     1.  Syncopal episodes.  This morning an RRT was called after he was slumped in chair and ashen after he went to shower without o2---he had desaturated into low 70's with BP 55/32 initially, responded quickly to oxygenation and BP quickly improved in <1 minute with getting back into bed before IVF were begun     Remains with severe orthostatic hypotension.    Tele c/w PAC's  Currently on max dose miodrine  Will discontinue metoprolol  He needs some lasix as otherwise he gets severely volume overloaded as demonstarted by multiple cxrays  tamsulosin and finasteride have been placed on hold in case effecting orthostasis  cont compression hose  Rehydrate today again with albumin and will follow with lasix.  Hope is improving his  albumin will improve his intravascular volume  Will discontinue his home seroquel as this too can cause orthostasis    2/ acute on chronic hypoxic resp failure this am after going to shower without oxygen  Also has needed intermittent increased oxygen earlier this admission when pulmonary edema worsened when he was off lasix  Will need some lasix, plan on starting back on regularly scheduled dose in am  On 3liters at home chronically  Will add on abx, as acute alveolar process, currently on doxycycline as gm pos cocci in sputum initially reported now reported as yeast in culture.  Will complete 5 days abx  .   3.  Urinary retention  Chronic urethral catheter will need to reschedule with urology  proscar and tamsulosin discontinued in case contributing to orthostasis    4  History of heart failure with preserved ejection fraction.   Predominantly acute hypoxemic respiratory failure, will need to find the right dose of lasix to not cause volume overload while not causing syncope     5.  Chronic obstructive pulmonary disease with exacerbation, and possible lower tract disease, will add on doxycycline  Maintain on steroids  Cont laba/lama/IS    6.  Peripheral vascular disease.  cad pci 2013, bilat cea  He just underwent a left internal and external iliac stenting.  No signs of any ischemia.    Cont asa, statin, plavix    7.  Generalized weakness and gait instability  Greatly complicated by his orthostatic hypotension.  Continued physical therapy and Occupational Therapy to the extent this is possible given  His severe  orthostatic hypotension     8 recent hemptysis with bronch showing alveaolar hemorrhage, had negative vasculitis workup, repeat vasculitis workup as still with minimal hemoptysis--GBM/anca neg, jose a pending    9 electrolyte derangement replace potassium, magnesium    10 gerd on protonix    11 troponinemia, likely from demand ischemia from volume overloaded status    12 severe prot calorie malnutrition with  3rd spacing which may lead to intravascular volume depletion, will give albumin again today    13 normocytic anemia, check iron studies--ok, b12/folate--ok    14 insomnia--discontinue seroquel and use melatonin   disposition: He is quite frustrated with his prolonged course in the past month or 2 indicating that his greatest desire is to return home and in fact that is the only reason he is willing to accept nursing home placement to allow rehabilitation and improve functional status.  He acknowledges that unless he is able to walk he would not be able to manage at home  Diet: Regular Diet Adult    DVT Prophylaxis: on DAPT with minimal hemoptysis, avoid pharmacologic  Clifton Catheter: in place, indication: Retention  Code Status: DNR/DNI           Disposition Plan   Expected discharge: 2 - 3 days, recommended to prior living arrangement once adequate blood pressures, oxygenation.  Entered: Guido Mccollum MD 05/20/2020, 10:44 AM       The patient's care was discussed with the Patient.    Guido Mccollum MD  Hospitalist Service  [facility]    ______________________________________________________________________    Interval History   This am RRT called when he was slumped over in chgair after getting back from shower without oxygen on     Before this episode he had uneventful night. Has some wheezing this am.  Still some cough with minimal hemoptysis. Denies nausea, vomiting, fevers, chills, melena, hematochezia    Data reviewed today: I reviewed all medications, new labs and imaging results over the last 24 hours. I personally reviewed the chest x-ray image(s) showing minimally incraesed pulm edema.    Physical Exam   Vital Signs: Temp: 96.5  F (35.8  C) Temp src: Tympanic BP: 164/68 Pulse: 65 Heart Rate: 59 Resp: 18 SpO2: 92 % O2 Device: Nasal cannula with humidification Oxygen Delivery: 3 LPM  Weight: 208 lbs 15.94 oz  Constitutional: awake, alert, cooperative, no apparent distress after desaturation  episode resolved, and appears stated age  Respiratory: No increased work of breathing, good air exchange, clear to auscultation bilaterally, no crackles.  Some wheezing bilaterally  Cardiovascular: Normal apical impulse, regular rate and rhythm, normal S1 and S2, no S3 or S4, and no murmur noted  GI: No scars, normal bowel sounds, soft, non-distended, non-tender, no masses palpated, no hepatosplenomegally  Skin: no bruising or bleeding  Musculoskeletal: There is no redness, warmth, or swelling of the joints.  Full range of motion noted.  Motor strength is 5 out of 5 all extremities bilaterally.  Tone is normal.  Neurologic: Awake, alert, oriented to name, place and time.  Cranial nerves II-XII are grossly intact.  Motor is 5 out of 5 bilaterally.  Cerebellar finger to nose, heel to shin intact.  Sensory is intact.  Babinski down going, Romberg negative, and gait is normal.    Data   Recent Labs   Lab 05/20/20  0521 05/19/20  0514 05/18/20  0518 05/17/20  1155 05/17/20  0450  05/14/20  1608 05/14/20  1004   WBC 8.2 9.6 10.7  --  9.0   < >  --  7.7   HGB 9.9* 10.0* 10.2*  --  10.0*   < >  --  10.6*   MCV 81 81 80  --  82   < >  --  81    143* 148*  --  135*   < >  --  158   INR  --   --   --   --   --   --   --  1.12    142 140  --  140   < >  --  140   POTASSIUM 3.7 3.7 3.5  --  3.6   < >  --  3.8   CHLORIDE 109 109 107  --  112*   < >  --  108   CO2 29 30 30  --  26   < >  --  28   BUN 23 26 26  --  22   < >  --  23   CR 0.94 1.04 0.91  --  0.78   < >  --  0.91   ANIONGAP 2* 3 3  --  2*   < >  --  4   CLARA 8.3* 8.1* 8.3*  --  7.7*   < >  --  8.2*   * 96 119*  --  112*   < >  --  114*   ALBUMIN 1.9* 1.7* 1.2*  --  1.2*   < >  --  1.4*   PROTTOTAL 5.5* 5.4* 5.5*  --  5.2*   < >  --  5.7*   BILITOTAL 0.4 0.3 0.2  --  0.2   < >  --  0.3   ALKPHOS 58 64 72  --  72   < >  --  74   ALT 13 14 13  --  13   < >  --  13   AST 10 12 12  --  15   < >  --  17   TROPI  --   --   --  0.055* 0.069*  --  0.057*  0.074*    < > = values in this interval not displayed.

## 2020-05-20 NOTE — SIGNIFICANT EVENT
"At 0840 the aide came out of pts room and immediately told me pt was not responding.  I went into pts room and tried to rouse him with no response.  Called Rapid Response and immediately placed O2 back on and turned up with VS in process. See VS as charted.   O2 SAT undectable at first but within a minute SAT was 84%. Pt started talking and responding appropriately.  See VS as charted.  With asst of 2 and gait belt, pt was transferred back into bed.  O2 turned back down per MD, was left on 4 LPM O2 with SATS 90-92%.  I called pts wife and explained what happened and she says, \" he takes his oxygen off at home often and the same thing happens.\"  She said. \"I am not at all surprised.\"    Pt was previously brought into the shower via wheeled shower chair.  He is chronically on 3 LPM O2 and he told aides he doesn't need his oxygen while showering.  O2 was removed by aide.  Pt was showered by aides which took approx 5 mins as stated by aide, then wheeled back to bedside when he went unresponsive.    "

## 2020-05-21 NOTE — PLAN OF CARE
Rec'd order for hydralazine.Pt refuses med at this time.    [id number] Text page sent to current hospiutalist, Dr Moreno, Letting him know pt refuses this med.

## 2020-05-21 NOTE — PLAN OF CARE
Reason for hospital stay:  Episode of syncope  Living situation PTA: [facility]  Most recent vitals: /55   Pulse 66   Temp 97.5  F (36.4  C) (Tympanic)   Resp 19   Ht 1.829 m (6')   Wt 94.8 kg (208 lb 15.9 oz)   SpO2 92%   BMI 28.34 kg/m      Pain Management:  Denies pain  LOC:  A&O x4 pt was cooperative but reluctant to be woken up. Pt refused orthostatic blood pressures.   Cardiac:  WDL denies chest pain  Respiratory:  Crackles heard in lower lobes. Denies shortness of breath.   GI:  Bowel sounds present and active x4  : Clifton in place, with adequate output  Skin Issues:  No issues noted    IVF:  Saline locked  ABX:  PO doxy    Nutrition: Regular  Safety: Pt free from injury and falls this shift. Call light within reach.         5/21/2020  4:18 AM  Dana Sousa RN    Face to face report given with opportunity to observe patient.    Report given to Lisa Sousa RN   5/21/2020  7:17 AM

## 2020-05-21 NOTE — PLAN OF CARE
Discharge Planner PT   Patient plan for discharge: Return to SNF  Current status: Patient up in chair at start of therapy. Performed sit to stand x 2 with min to modA. Once standing - took blood pressure which measured 85/55. Patient reports feeling fatigued and having to sit down. Patient unable to safely tolerate any ambulation today. Will reassess patient's vitals and ability with ambulation tomorrow.  Barriers to return to prior living situation: weakness, deconditioning, low BP  Recommendations for discharge: Return to SNF for STR  Rationale for recommendations: currently not safe to return home at this time       Entered by: Sony Pagan 05/21/2020 2:15 PM

## 2020-05-21 NOTE — PLAN OF CARE
Reason for hospital stay: syncope  Living situation PTA: [facility].   Most recent vitals: /55   Pulse 67   Temp 96.9  F (36.1  C) (Tympanic)   Resp 18   Ht 1.829 m (6')   Wt 94.8 kg (208 lb 15.9 oz)   SpO2 (!) 90%   BMI 28.34 kg/m    Pain Management: denies pain.   LOC:  alert and oriented, cooperative with assessments including orthostats  Cardiac:  regular- denies chest pain, was unable to to tolerate standing for standing BP to complete- when he had to sit the final BP for standing was 68/28- patient sat because was having dizziness and weakness.   Respiratory:  crackles bilateral bases, occasional loose cough. Holding Lasix dose this morning.   GI:  reports last BM was maybe Saturday.  Senna was given  this morning.   :  trinh in place  IVF:  Saline locked  ABX:  none  Nutrition: Regular diet.   ADL's:  assist of 1 for short transfers, not tolerating ambulating or standing for long periods of time today.   Ambulation:unable d/t lightheadedness decrease BP with standing.   Discharge care conference held.   Attendees: Nursing, , Physical Therapy, Occupational Therapy, Pharmacy, Nutrition Services, Respiratory Therapy, and Physician    Face to face report given with opportunity to observe patient.    Report given to Carmen PEREZ.     Lisa Bro RN   5/21/2020  3:23 PM          5/21/2020  11:17 AM  Lisa Bro RN

## 2020-05-21 NOTE — PLAN OF CARE
"Discharge Planner OT   Patient plan for discharge: Return to SNF  Current status: Pt sitting in the recliner upon OT arrival. He stated feeling \"lousy\" due to orthostasis issues and low BP. Therefore, pt declined participating in ADLs, stating he didn't feel like it. Pt agreeable to UB strengthening x 10 reps each including finger flex/ext, wrist flex/ext, forearm pro/sup, elbow flex/ext, and shoulder flexion (with active assist on R shoulder due to previous injuries and difficulties with ROM); pt tolerated well and denied pain.  Barriers to return to prior living situation: Fall risk, BP/orthostasis issues, weakness, deconditioning, at risk for further functional decline  Recommendations for discharge: Return to SNF for STR  Rationale for recommendations: Due to pt's current functional status, he would not be safe to return home.        Entered by: Pamela Thompson 05/21/2020 12:38 PM     "

## 2020-05-21 NOTE — PROGRESS NOTES
[facility]    Medicine Progress Note - Hospitalist Service       Date of Admission:  5/14/2020  Assessment & Plan   Lamont Doran is a 74 year old  man who was admitted on 5/14/2020.  He has been at [facility] for rehabilitation.  While there, he had several episodes of syncope  felt to be orthostatic in nature.  He was recently evaluated extensively at [facility] 3/14-19.  Echocardiogram showed normal systolic function without evident structural abnormality.  CTPA, showing no pulmonary embolism.  MRI of the brain was negative.  Telemetry showed sinus with PACs.    He did have some hemoptysis while he was there for which he  underwent bronchoscopy that showed alveolar hemorrhage, and polymicrobial sputum was noted and vasculitis workup was neg.    Subsequently he underwent a surgical procedure at [facility] on 05/11 with a left common and left external iliac angioplasty and stents placed by Dr. George in hope that the improved blood flow would improve his ambulation.    Apparently lower extremity compression hose were not resumed.  He is always somewhat dyspneic because he does have COPD.  able to walk short distances with PT with a wheelchair behind him.     1.  Syncopal episodes. 5/20 had RRT was called after he was slumped in chair and ashen after he went to shower without o2---he had desaturated into low 70's with BP 55/32 initially, responded quickly to oxygenation and BP quickly improved in <1 minute with getting back into bed before IVF were begun     Remains with severe orthostatic hypotension.    Tele c/w PAC's  Currently on max dose miodrine  Will discontinue metoprolol  He needs some lasix as otherwise he gets severely volume overloaded as demonstarted by multiple cxrays  tamsulosin and finasteride have been placed on hold in case effecting orthostasis  cont compression hose  Will discontinue his home seroquel as this too can cause orthostasis  Decrease lasix to 20 mg  BID    2/ acute on chronic hypoxic resp failure  Also has needed intermittent increased oxygen earlier this admission when pulmonary edema worsened when he was off lasix  Will need some lasix, plan on starting back on regularly scheduled dose in am; decreased as noted above  On 3liters at home chronically  Will add on abx, as acute alveolar process, currently on doxycycline as gm pos cocci in sputum initially reported now reported as yeast in culture.  Will complete 5 days abx  Check procal  .   3.  Urinary retention  Chronic urethral catheter will need to reschedule with urology  proscar and tamsulosin discontinued in case contributing to orthostasis    4  History of heart failure with preserved ejection fraction.   Predominantly acute hypoxemic respiratory failure, will need to find the right dose of lasix to not cause volume overload while not causing syncope     5.  Chronic obstructive pulmonary disease with exacerbation, and possible lower tract disease, will add on doxycycline  Continue inhalers    6.  Peripheral vascular disease.  cad pci 2013, bilat cea  He just underwent a left internal and external iliac stenting.  No signs of any ischemia.    Cont asa, statin, plavix    7.  Generalized weakness and gait instability  Greatly complicated by his orthostatic hypotension.  Continued physical therapy and Occupational Therapy to the extent this is possible given  His severe  orthostatic hypotension     8 recent hemptysis with bronch showing alveaolar hemorrhage, had negative vasculitis workup, repeat vasculitis workup as still with minimal hemoptysis--GBM/anca neg, jose a pending   -negative    9 electrolyte derangement replace potassium, magnesium    10 gerd on protonix    11 troponinemia, likely from demand ischemia from volume overloaded status    12 severe prot calorie malnutrition with 3rd spacing which may lead to intravascular volume depletion,    13 normocytic anemia, check iron studies--ok,  b12/folate--ok    14 insomnia--discontinue seroquel and use melatonin   disposition: He is quite frustrated with his prolonged course in the past month or 2 indicating that his greatest desire is to return home and in fact that is the only reason he is willing to accept nursing home placement to allow rehabilitation and improve functional status.  He acknowledges that unless he is able to walk he would not be able to manage at home    Diet: Regular Diet Adult    DVT Prophylaxis: on DAPT with minimal hemoptysis, avoid pharmacologic  Clifton Catheter: in place, indication: Retention  Code Status: DNR/DNI           Disposition Plan   Expected discharge: 2 - 3 days, recommended to prior living arrangement once adequate blood pressures, oxygenation.  Entered: Mick Wallace MD 05/21/2020, 3:36 PM       The patient's care was discussed with the Patient.    Mick Wallace MD  Hospitalist Service  [facility]    ______________________________________________________________________    Interval History   Patient sitting in chair this morning  Denies chest pain  Has not been able to ambulate  SOB at baseline  Denies cough    Data reviewed today: I reviewed all medications, new labs and imaging results over the last 24 hours.    cxr  IMPRESSION: No significant change in the appearance of the chest.  Findings may be due to pulmonary edema or diffuse pneumonia.    Physical Exam   Vital Signs: Temp: 96.4  F (35.8  C) Temp src: Tympanic BP: 145/65 Pulse: 58 Heart Rate: 55 Resp: 18 SpO2: 92 % O2 Device: Nasal cannula Oxygen Delivery: 3 LPM  Weight: 208 lbs 15.94 oz  Constitutional: no acute distress  Respiratory: ctab  Cardiovascular: Bradycardic; normal s1 s2  GI: soft, nt, nd  Skin: no bruising or bleeding  Musculoskeletal: age appropriate muscle mass  Neurologic: Awake, alert, oriented to name, place and time.  Cranial nerves II-XII are grossly intact.      Data   Recent Labs   Lab 05/21/20  0542 05/20/20  0521  05/19/20  0514 05/18/20  0518 05/17/20  1155 05/17/20  0450  05/14/20  1608   WBC  --  8.2 9.6 10.7  --  9.0   < >  --    HGB  --  9.9* 10.0* 10.2*  --  10.0*   < >  --    MCV  --  81 81 80  --  82   < >  --    PLT  --  157 143* 148*  --  135*   < >  --     140 142 140  --  140   < >  --    POTASSIUM 4.0 3.7 3.7 3.5  --  3.6   < >  --    CHLORIDE 109 109 109 107  --  112*   < >  --    CO2 29 29 30 30  --  26   < >  --    BUN 26 23 26 26  --  22   < >  --    CR 0.88 0.94 1.04 0.91  --  0.78   < >  --    ANIONGAP 3 2* 3 3  --  2*   < >  --    CLARA 8.6 8.3* 8.1* 8.3*  --  7.7*   < >  --    * 103* 96 119*  --  112*   < >  --    ALBUMIN 2.4* 1.9* 1.7* 1.2*  --  1.2*   < >  --    PROTTOTAL 5.7* 5.5* 5.4* 5.5*  --  5.2*   < >  --    BILITOTAL 0.4 0.4 0.3 0.2  --  0.2   < >  --    ALKPHOS 58 58 64 72  --  72   < >  --    ALT 14 13 14 13  --  13   < >  --    AST 10 10 12 12  --  15   < >  --    TROPI  --   --   --   --  0.055* 0.069*  --  0.057*    < > = values in this interval not displayed.

## 2020-05-21 NOTE — PLAN OF CARE
No falls or injuries this shift. No syncopal episodes. Able to fully cooperate with orthostatic bp's. Diuresed well after 40 mg IV lasix at 1600-30 minutes after Albumin completed. Appetite good for dinner. Teds off at HS. Uses call light approp.      Face to face report given with opportunity to observe patient.    Report given to Dana Claudio RN   5/20/2020  11:32 PM

## 2020-05-22 NOTE — PHARMACY
[facility]    Pharmacy      Antimicrobial Stewardship Note     Current antimicrobial therapy:  Anti-infectives (From now, onward)    Start     Dose/Rate Route Frequency Ordered Stop    05/19/20 1000  doxycycline hyclate (VIBRA-TABS) tablet 100 mg      100 mg Oral EVERY 12 HOURS SCHEDULED 05/19/20 0910            Indication: CAP    Days of Therapy: 4     Pertinent labs:  Creatinine   Creatinine   Date Value Ref Range Status   05/22/2020 0.88 0.66 - 1.25 mg/dL Final   05/21/2020 0.88 0.66 - 1.25 mg/dL Final   05/20/2020 0.94 0.66 - 1.25 mg/dL Final     WBC   WBC   Date Value Ref Range Status   05/22/2020 8.8 4.0 - 11.0 10e9/L Final   05/20/2020 8.2 4.0 - 11.0 10e9/L Final   05/19/2020 9.6 4.0 - 11.0 10e9/L Final     Procalcitonin   Procalcitonin   Date Value Ref Range Status   05/21/2020 0.06 ng/ml Final     Comment:     0.05-0.24 ng/ml Low risk of systemic bacterial infection. Local bacterial   infection possible.  Recommendation: Assess other clinical features of   infection. Discourage antibiotics unless strong clinical suspicion for serious   infection.     05/17/2020 0.07 ng/ml Final     Comment:     0.05-0.24 ng/ml Low risk of systemic bacterial infection. Local bacterial   infection possible.  Recommendation: Assess other clinical features of   infection. Discourage antibiotics unless strong clinical suspicion for serious   infection.       CRP   CRP Inflammation   Date Value Ref Range Status   05/17/2020 11.6 (H) 0.0 - 8.0 mg/L Final   01/31/2015 <2.9 0.0 - 8.0 mg/L Final       Culture Results:        Recommendations/Interventions:  1. Does the patient need antifungal therapy for Candida tropicalis in sputum? Negative blood cultures, WBC count wnl, afebrile. Recommend repeat sputum culture.     Ruby Lee RPH  May 22, 2020

## 2020-05-22 NOTE — PLAN OF CARE
Reason for hospital stay: Syncopal episodes  Living situation PTA: [facility]  Most recent vitals: /58   Pulse 65   Temp 96.3  F (35.7  C) (Tympanic)   Resp 19   Ht 1.829 m (6')   Wt 94.8 kg (208 lb 15.9 oz)   SpO2 (!) 91%   BMI 28.34 kg/m      Pain Management:  denies  LOC:  A&Ox4   Cardiac:  On tele SR 60s per ICU tele report. Apical pulse regular denies chest pain  Respiratory:  Denies shortness of breath, crackles heard in bases. Productive infrequent cough. On 3 lpm nasal cannula O2  GI: bowel sounds present x4  :  Clifton in place for retention. Has been cleaned and emptied.    IVF:  Saline locked  ABX:  PO doxy    Nutrition: Regular  Safety:  Pt has been free from injury and falls this shift. Call light is within reach. No syncopal episodes this shift.  Pt has refused hydralazine for high blood pressures this shift.     5/22/2020  3:43 AM  Dana Sousa RN    Face to face report given with opportunity to observe patient.    Report given to Jose Sousa RN   5/22/2020  6:58 AM

## 2020-05-22 NOTE — PROGRESS NOTES
Case discussed with [facility]; Recommendations  1. Start florinef; monitor for rebound hypertension  2. Recommend outpatient Neurology consult for autonomic dysfunction   3. High salt diet  4. Will need continued PT    I will also check TSH  Mick Wallace MD

## 2020-05-22 NOTE — PLAN OF CARE
Spoke w/ MD Ruano via phone about patient's BP of 70/40s from standing to sitting w/ pt reporting being dizzy. MD informed nurse that the pt will start on florinef per [facility] recommendation.     Nurse to routinely check BP to assess for rebound HTN.

## 2020-05-22 NOTE — PLAN OF CARE
Discharge Planner OT   Patient plan for discharge: Return to SNF  Current status: Pt in the bed upon OT arrival with HOB elevated and watching TV. Pt participated in UB strengthening x 10-15 reps each including finger flex/ext, wrist flex/ext, forearm pro/sup, elbow flex/ext, and shoulder flex with assistance for his R shoulder due to an old injury. Pt completed a 2nd set of R shoulder AAROM and was educated in 2 hand clasp for shoulder flexion to be able to complete IND.  Barriers to return to prior living situation: fall risk, weakness, deconditioning, BP issues, at risk for further functional decline   Recommendations for discharge: SNF  Rationale for recommendations: Due to pt's current functional status, he would not be safe to return home       Entered by: Pamela Thompson 05/22/2020 2:30 PM

## 2020-05-22 NOTE — PROGRESS NOTES
[facility]    Medicine Progress Note - Hospitalist Service       Date of Admission:  5/14/2020  Assessment & Plan   Lamont Doran is a 74 year old  man who was admitted on 5/14/2020.  He has been at [facility] for rehabilitation.  While there, he had several episodes of syncope  felt to be orthostatic in nature.  He was recently evaluated extensively at [facility] 3/14-19.  Echocardiogram showed normal systolic function without evident structural abnormality.  CTPA, showing no pulmonary embolism.  MRI of the brain was negative.  Telemetry showed sinus with PACs.    He did have some hemoptysis while he was there for which he  underwent bronchoscopy that showed alveolar hemorrhage, and polymicrobial sputum was noted and vasculitis workup was neg.    Subsequently he underwent a surgical procedure at [facility] on 05/11 with a left common and left external iliac angioplasty and stents placed by Dr. George in hope that the improved blood flow would improve his ambulation.    Apparently lower extremity compression hose were not resumed.  He is always somewhat dyspneic because he does have COPD.  able to walk short distances with PT with a wheelchair behind him.     1.  Syncopal episodes. 5/20 had RRT was called after he was slumped in chair and ashen after he went to shower without o2---he had desaturated into low 70's with BP 55/32 initially, responded quickly to oxygenation and BP quickly improved in <1 minute with getting back into bed before IVF were begun     Remains with severe orthostatic hypotension.    Tele c/w PAC's  Currently on max dose miodrine  Will discontinue metoprolol  He needs some lasix as otherwise he gets severely volume overloaded as demonstarted by multiple cxrays  tamsulosin and finasteride have been placed on hold in case effecting orthostasis  cont compression hose  Will discontinue his home seroquel as this too can cause orthostasis  Decrease lasix to 20 mg  BID    5/22/20  Case discussed with [facility]; Dr Maurer  Recommendations  1. Start florinef; monitor for rebound hypertension  2. Recommend outpatient Neurology consult for autonomic dysfunction/dysregulation evaluation   3. High salt diet  4. Will need continued PT    Will also check TSH    2/ acute on chronic hypoxic resp failure  Also has needed intermittent increased oxygen earlier this admission when pulmonary edema worsened when he was off lasix  Will need some lasix, plan on starting back on regularly scheduled dose in am; decreased as noted above  On 3liters at home chronically  Will add on abx, as acute alveolar process, currently on doxycycline as gm pos cocci in sputum initially reported now reported as yeast in culture.  Will complete 5 days abx    .   3.  Urinary retention  Chronic urethral catheter will need to reschedule with urology  proscar and tamsulosin discontinued in case contributing to orthostasis    4  History of heart failure with preserved ejection fraction/Chronic diastolic failure  will need to find the right dose of lasix to not cause volume overload while not causing syncope     5.  Chronic obstructive pulmonary disease with exacerbation, and possible lower tract disease, will add on doxycycline  Continue inhalers    6.  Peripheral vascular disease.  cad pci 2013, bilat cea  He just underwent a left internal and external iliac stenting.  No signs of any ischemia.    Cont asa, statin, plavix    7.  Generalized weakness and gait instability  Greatly complicated by his orthostatic hypotension.  Continued physical therapy and Occupational Therapy to the extent this is possible given  His severe  orthostatic hypotension     8 recent hemptysis with bronch showing alveaolar hemorrhage, had negative vasculitis workup, repeat vasculitis workup as still with minimal hemoptysis--GBM/anca neg, jose a pending   -negative    9 electrolyte derangement replace potassium, magnesium    10 gerd on  protonix    11 troponinemia, likely from demand ischemia from volume overloaded status    12 severe prot calorie malnutrition with 3rd spacing which may lead to intravascular volume depletion,    13 normocytic anemia, check iron studies--ok, b12/folate--ok    14 insomnia--discontinue seroquel and use melatonin      15. Severe malnutrition ruled out,       Malnutrition Diagnosis: Patient does not meet two of the criteria necessary for diagnosing malnutrition. Noted weight loss, pt on lasix at home and has been in short term rehab which may have increased his activity level. Intake has been good.      disposition: He is quite frustrated with his prolonged course in the past month or 2 indicating that his greatest desire is to return home and in fact that is the only reason he is willing to accept nursing home placement to allow rehabilitation and improve functional status.  He acknowledges that unless he is able to walk he would not be able to manage at home    Diet: Regular Diet Adult    DVT Prophylaxis: on DAPT with minimal hemoptysis, avoid pharmacologic  Clifton Catheter: in place, indication: Retention  Code Status: DNR/DNI           Disposition Plan   Expected discharge: 2 - 3 days, recommended to prior living arrangement once adequate blood pressures, oxygenation. Improved orthostasis   Entered: Mick Wallace MD 05/22/2020, 12:09 PM       The patient's care was discussed with the Patient.    Mick Wallace MD  Hospitalist Service  [facility]    ______________________________________________________________________    Interval History   Sob/cough at baseline  Patient continues to have dizziness and orthostasis  Denies chest pain     Case discussed with [facility]; Dr Maurer  Recommendations  1. Start florinef; monitor for rebound hypertension  2. Recommend outpatient Neurology consult for autonomic dysfunction   3. High salt diet  4. Will need continued PT    Data reviewed today: I  reviewed all medications, new labs and imaging results over the last 24 hours.    cxr  IMPRESSION: No significant change in the appearance of the chest.  Findings may be due to pulmonary edema or diffuse pneumonia.    Physical Exam   Vital Signs: Temp: 96.4  F (35.8  C) Temp src: Tympanic BP: 123/60 Pulse: 65 Heart Rate: 65 Resp: 20 SpO2: (!) 91 % O2 Device: Nasal cannula Oxygen Delivery: 3 LPM  Weight: 205 lbs 3.97 oz  Constitutional: no acute distress  Respiratory: ctab  Cardiovascular: RRR; normal s1 s2  GI: soft, nt, nd  Skin: no bruising or bleeding  Musculoskeletal: age appropriate muscle mass  Neurologic: Awake, alert, oriented to name, place and time.  Moving extremities; appears at baseline state.      Data   Recent Labs   Lab 05/22/20  0518 05/21/20  0542 05/20/20  0521 05/19/20  0514  05/17/20  1155 05/17/20  0450   WBC 8.8  --  8.2 9.6   < >  --  9.0   HGB 10.2*  --  9.9* 10.0*   < >  --  10.0*   MCV 81  --  81 81   < >  --  82     --  157 143*   < >  --  135*    141 140 142   < >  --  140   POTASSIUM 4.2 4.0 3.7 3.7   < >  --  3.6   CHLORIDE 110* 109 109 109   < >  --  112*   CO2 28 29 29 30   < >  --  26   BUN 28 26 23 26   < >  --  22   CR 0.88 0.88 0.94 1.04   < >  --  0.78   ANIONGAP 2* 3 2* 3   < >  --  2*   CLARA 8.5 8.6 8.3* 8.1*   < >  --  7.7*   * 123* 103* 96   < >  --  112*   ALBUMIN  --  2.4* 1.9* 1.7*   < >  --  1.2*   PROTTOTAL  --  5.7* 5.5* 5.4*   < >  --  5.2*   BILITOTAL  --  0.4 0.4 0.3   < >  --  0.2   ALKPHOS  --  58 58 64   < >  --  72   ALT  --  14 13 14   < >  --  13   AST  --  10 10 12   < >  --  15   TROPI  --   --   --   --   --  0.055* 0.069*    < > = values in this interval not displayed.

## 2020-05-22 NOTE — PLAN OF CARE
Here from [facility] for episode of syncope. A&O. VS as charted. Saline-locked. PO doxycycline. Tele reading SR 60s per ICU report. Apical irregular. Satting 93% on 3 LPM w/ reports of SOB and JOHNSON. Frequent, productive cough remains. Sputum is thick/red. LSs have crackles to RML & RLL. Extremities warm w/ no N&T reported. Trace edema noted to BLEs. Noah socks in place. Pedal pulses weak. ABD is rounded w/ BSs audible & normoactive x4. However, pt reports no BM since 05/16. Senna administered as scheduled and pt encouraged to drink fluids. Total output from trinh of 650 mL. Walked w/ PT today. Walking BP of 70/30s. MD notified w/ florinef started. Nurse to check BP per routine to monitor for rebound HTN. Will remain an assist of 2 w/ GB & walker. Good appetite w/ 100% of breakfast & 75% of lunch eaten. Spoke w/ daughter Jeimy this afternoon w/ update given. Bed in lowest position. Call light in reach. ID band in place. Makes needs known.     Face to face report given with opportunity to observe patient.    Report given to Carmen Tabor   5/22/2020  3:30 PM

## 2020-05-22 NOTE — PLAN OF CARE
Discharge Planner PT   Patient plan for discharge: Return to SNF  Current status: Pt seated in recliner at start of treatment. Okayed with nurse to try PT. Pt transferred with min A and ambulated about 75' c FWW and CGA. No c/o dizziness but fatigued. After sitting took pt's BP which was 78/36. Pt attempted to stand for transfer but got dizzy/pale. Wheeled pt back to room and assisted him to supine with min A, legs up. BP check again and was 134/67 and pt feeling better. Notifed nurse regarding event.   Barriers to return to prior living situation: BP issues  Recommendations for discharge: SNF  Rationale for recommendations: Clinical judgement        Entered by: Zuleyma Medina 05/22/2020 12:55 PM

## 2020-05-22 NOTE — PLAN OF CARE
Attempted to get pt to stand on scale for weight. Pt was too weak to stand long enough to get weight and standing blood pressure.

## 2020-05-22 NOTE — PLAN OF CARE
No falls or injuries this shift. No syncopal episodes this shift. Hypertensive. Order rec;d for Apresoline prn. Pt refuses to allow staff to give. MD updated. Teds removed at HS> Med with Melatonin for sleep. No stools this shift. Clifton remains in place.     Face to face report given with opportunity to observe patient.    Report given to     Carmen Claudio RN   5/21/2020  10:59 PM

## 2020-05-23 NOTE — PROGRESS NOTES
Discharge Planner PT   Patient plan for discharge: Return to SNF where he has been for short term stay when medically appropriate/indicated.  Current status: Orthostatic BP continues during supine to sit: (/53)  more signif with sit to stand. ( /121 which may not be correct) Did not tolerate ambulation this date during PT d/t dizziness with standing. Resting supine /71 prior to sitting up.   Barriers to return to prior living situation: n/a.Pt plans to return to SNF when medically ready.  Recommendations for discharge: Return to [facility] for continued rehab  Rationale for recommendations: clinical judgement       Entered by: Nuria Salas 05/23/2020 10:54 AM

## 2020-05-23 NOTE — PLAN OF CARE
No falls or injuries this shift. Was unable to complete all orthopedic BP's as pt got too tired during standing BP. Continues on Telemetry. Continues to cough up small amts of bloody sputum. Did have BM this evening. Good appetite for dinner. ..Face to face report given with opportunity to observe patient.    Report given to Bruno Claudio RN   5/23/2020  12:06 AM

## 2020-05-23 NOTE — PLAN OF CARE
On general assessment, patients blood pressure was 108/52, heart rate was 55, patient denied feeling short of breath and was not reporting feeling weak, patient does have a harsh productive cough, and coughs up thick tan/brownish sputum, patient has adequately made his needs known, patient has been administered medication to increase his blood pressure, and at the second blood pressure check, the blood pressure was 148/75, heart rate was 70, patient stated he was feeling so good, he wanted to go for a walk, instead, we got patient up from the bed to the commode and then to the chair, patient did report dizziness and lightheadedness with all that activity, patient did not however have a syncopal episode, and was able to make his needs known adequately. Patient has denied pain, vitals as charted.

## 2020-05-23 NOTE — PLAN OF CARE
Resting in bed no complaints notes at this time. Vitals as charted 0500  Unable to  Stand for  weight  Or standing blood pressure  Got light headed and dizzy.  Needed assist of two to get repositioned in bed  Denies pain.,  States i'll  Do better at 0800 . 0553 denies lightheadedness or dizzyness at this time 0612  Tried to get patient  Up to cammode  Patient was not able to stand  Got very lightheaded and dizzy  Had to lay patient back down and use bedpan       Face to face report given with opportunity to observe patient.    Report given to janice Hill RN   5/23/2020  7:20 AM

## 2020-05-23 NOTE — PLAN OF CARE
Face to face report given with opportunity to observe patient.    Report given to Adrienne Torres, RN   5/23/2020  3:32 PM

## 2020-05-23 NOTE — PHARMACY
"[facility]    Pharmacy      Antimicrobial Stewardship Note     Current antimicrobial therapy:  Anti-infectives (From now, onward)    Start     Dose/Rate Route Frequency Ordered Stop    05/19/20 1000  doxycycline hyclate (VIBRA-TABS) tablet 100 mg      100 mg Oral EVERY 12 HOURS SCHEDULED 05/19/20 0910            Indication: CAP    Days of Therapy: 5     Pertinent labs:  Creatinine   Creatinine   Date Value Ref Range Status   05/23/2020 0.83 0.66 - 1.25 mg/dL Final   05/22/2020 0.88 0.66 - 1.25 mg/dL Final   05/21/2020 0.88 0.66 - 1.25 mg/dL Final     WBC   WBC   Date Value Ref Range Status   05/23/2020 11.0 4.0 - 11.0 10e9/L Final   05/22/2020 8.8 4.0 - 11.0 10e9/L Final   05/20/2020 8.2 4.0 - 11.0 10e9/L Final     Procalcitonin   Procalcitonin   Date Value Ref Range Status   05/21/2020 0.06 ng/ml Final     Comment:     0.05-0.24 ng/ml Low risk of systemic bacterial infection. Local bacterial   infection possible.  Recommendation: Assess other clinical features of   infection. Discourage antibiotics unless strong clinical suspicion for serious   infection.     05/17/2020 0.07 ng/ml Final     Comment:     0.05-0.24 ng/ml Low risk of systemic bacterial infection. Local bacterial   infection possible.  Recommendation: Assess other clinical features of   infection. Discourage antibiotics unless strong clinical suspicion for serious   infection.       CRP   CRP Inflammation   Date Value Ref Range Status   05/17/2020 11.6 (H) 0.0 - 8.0 mg/L Final   01/31/2015 <2.9 0.0 - 8.0 mg/L Final       Culture Results:   (5/17/20) Sputum = Candida tropicalis  (5/17/20) Gram Stain = GPC  (5/17/20) Blood  (5/14/20) COVID = negative     Recommendations/Interventions:  1. Stop after today; per hospitalist note today, \"Will complete 5 days abx\"    Aguilar Garcia, [facility]  May 23, 2020        "

## 2020-05-23 NOTE — PROGRESS NOTES
[facility]    Medicine Progress Note - Hospitalist Service       Date of Admission:  5/14/2020  Assessment & Plan   Lamont Doran is a 74 year old  man who was admitted on 5/14/2020.  He has been at [facility] for rehabilitation.  While there, he had several episodes of syncope  felt to be orthostatic in nature.  He was recently evaluated extensively at [facility] 3/14-19.  Echocardiogram showed normal systolic function without evident structural abnormality.  CTPA, showing no pulmonary embolism.  MRI of the brain was negative.  Telemetry showed sinus with PACs.    He did have some hemoptysis while he was there for which he  underwent bronchoscopy that showed alveolar hemorrhage, and polymicrobial sputum was noted and vasculitis workup was neg.    Subsequently he underwent a surgical procedure at [facility] on 05/11 with a left common and left external iliac angioplasty and stents placed by Dr. George in hope that the improved blood flow would improve his ambulation.    Apparently lower extremity compression hose were not resumed.  He is always somewhat dyspneic because he does have COPD.  able to walk short distances with PT with a wheelchair behind him.  He has the following Acute medical issues:     1.  Syncopal episodes with severe hypotension. 5/20 had RRT was called after he was slumped in chair and ashen after he went to shower without o2---he had desaturated into low 70's with BP 55/32 initially, responded quickly to oxygenation and BP quickly improved in <1 minute with getting back into bed before IVF were begun     Remains with severe orthostatic hypotension.    Tele c/w PAC's  Currently on max dose miodrine  Will discontinue metoprolol  He needs some lasix as otherwise he gets severely volume overloaded as demonstarted by multiple cxrays  tamsulosin and finasteride have been placed on hold in case effecting orthostasis  cont compression hose  Will discontinue his home  seroquel as this too can cause orthostasis  Started on Florinef in addition to midodrine.  Will need neurology follow-up as an outpatient.  TSH was normal.  I also suspect adrenal insufficiency.  Check random cortisol levels.  If those are low then we will need ACTH stim test.  In the meantime I will try 4 mg of IV dexamethasone.  That might improve his blood pressure.    2/ acute on chronic hypoxic resp failure  Also has needed intermittent increased oxygen earlier this admission when pulmonary edema worsened when he was off lasix  Will need some lasix, plan on starting back on regularly scheduled dose in am; decreased as noted above  On 3liters at home chronically  Will add on abx, as acute alveolar process, currently on doxycycline as gm pos cocci in sputum initially reported now reported as yeast in culture.  Will complete 5 days abx    .   3.  Urinary retention  Chronic urethral catheter will need to reschedule with urology  proscar and tamsulosin discontinued in case contributing to orthostasis    4  Acute on chronic HFpEF  Monitor intake and output with daily weights.  Restored Lasix 40 mg p.o. twice daily.  The patient still has bibasilar crackles.  Continue on oxygen supplementation.  If the blood pressure improves then we will aggressively diurese him with IV Lasix.     5.  Chronic obstructive pulmonary disease with exacerbation, and possible lower tract disease, added doxycycline  Continue inhalers    6.  Peripheral vascular disease.  cad pci 2013, bilat cea  He just underwent a left internal and external iliac stenting.  No signs of any ischemia.    Cont asa, statin, plavix    7.  Generalized weakness and gait instability  Greatly complicated by his orthostatic hypotension.  Continued physical therapy and Occupational Therapy to the extent this is possible given  His severe  orthostatic hypotension     8 recent hemptysis with bronch showing alveaolar hemorrhage, had negative vasculitis workup, repeat  vasculitis workup as still with minimal hemoptysis--GBM/anca neg, jose a pending   -negative    9 electrolyte derangement replace potassium, magnesium    10 gerd on protonix    11 troponinemia, likely from demand ischemia from volume overloaded status    12 severe prot calorie malnutrition with 3rd spacing which may lead to intravascular volume depletion,    13 normocytic anemia, check iron studies--ok, b12/folate--ok    14 insomnia--discontinue seroquel and use melatonin      15. Severe malnutrition ruled out,       Malnutrition Diagnosis: Patient does not meet two of the criteria necessary for diagnosing malnutrition. Noted weight loss, pt on lasix at home and has been in short term rehab which may have increased his activity level. Intake has been good.       Disposition: He is still medically not stable to go back to the nursing home.  He is hypotensive, has pulmonary edema and struggling with severe weakness.    Diet: Regular Diet Adult    DVT Prophylaxis: on DAPT with minimal hemoptysis, avoid pharmacologic  Clifton Catheter: in place, indication: Retention  Code Status: DNR/DNI           Disposition Plan   Expected discharge: 2 - 3 days, recommended to prior living arrangement once adequate blood pressures, oxygenation. Improved orthostasis   Entered: Mark Tran MD 05/23/2020, 11:23 AM       The patient's care was discussed with the Patient.    Mark Tran MD  Hospitalist Service  [facility]    ______________________________________________________________________    Interval History    The patient was seen and examined.  Overnight events reviewed.  Discussed with the nursing staff.  Hospital course reviewed.  The patient continues to have shortness of breath with dizziness and orthostasis.  It is hard for him to stand up due to orthostatic hypotension.  Denies chest pain.    Data reviewed today: I reviewed all medications, new labs and imaging results over the last 24 hours.    cxr  IMPRESSION: No  significant change in the appearance of the chest.  Findings may be due to pulmonary edema or diffuse pneumonia.    Physical Exam   Vital Signs: Temp: (!) 95  F (35  C) Temp src: Tympanic BP: 108/52 Pulse: 56 Heart Rate: 55 Resp: 20 SpO2: 94 % O2 Device: Nasal cannula Oxygen Delivery: 3 LPM  Weight: 205 lbs 3.97 oz  Constitutional: Looks short of breath  Respiratory: Bibasilar fine crackles  Cardiovascular: RRR; normal s1 s2  GI: soft, nt, nd  Skin: no bruising or bleeding  Musculoskeletal: age appropriate muscle mass  Neurologic: Awake, alert, oriented to name, place and time.  Moving extremities; appears at baseline state.      Data   Recent Labs   Lab 05/23/20  0552 05/22/20  0518 05/21/20  0542 05/20/20  0521  05/17/20  1155 05/17/20  0450   WBC 11.0 8.8  --  8.2   < >  --  9.0   HGB 10.4* 10.2*  --  9.9*   < >  --  10.0*   MCV 81 81  --  81   < >  --  82    162  --  157   < >  --  135*    140 141 140   < >  --  140   POTASSIUM 3.6 4.2 4.0 3.7   < >  --  3.6   CHLORIDE 111* 110* 109 109   < >  --  112*   CO2 26 28 29 29   < >  --  26   BUN 29 28 26 23   < >  --  22   CR 0.83 0.88 0.88 0.94   < >  --  0.78   ANIONGAP 3 2* 3 2*   < >  --  2*   CLARA 8.4* 8.5 8.6 8.3*   < >  --  7.7*   * 114* 123* 103*   < >  --  112*   ALBUMIN  --   --  2.4* 1.9*   < >  --  1.2*   PROTTOTAL  --   --  5.7* 5.5*   < >  --  5.2*   BILITOTAL  --   --  0.4 0.4   < >  --  0.2   ALKPHOS  --   --  58 58   < >  --  72   ALT  --   --  14 13   < >  --  13   AST  --   --  10 10   < >  --  15   TROPI  --   --   --   --   --  0.055* 0.069*    < > = values in this interval not displayed.     Current Facility-Administered Medications   Medication     acetaminophen (TYLENOL) Suppository 650 mg     acetaminophen (TYLENOL) tablet 650 mg     aspirin EC tablet 162 mg     atorvastatin (LIPITOR) tablet 80 mg     clopidogrel (PLAVIX) tablet 75 mg     doxycycline hyclate (VIBRA-TABS) tablet 100 mg     fludrocortisone (FLORINEF) tablet  100 mcg     fluticasone-vilanterol (BREO ELLIPTA) 200-25 MCG/INH inhaler 1 puff     furosemide (LASIX) tablet 40 mg     guaiFENesin-dextromethorphan (ROBITUSSIN DM) 100-10 MG/5ML syrup 5 mL     ipratropium - albuterol 0.5 mg/2.5 mg/3 mL (DUONEB) neb solution 3 mL     lidocaine (LMX4) kit     lidocaine 1 % 0.1-1 mL     melatonin tablet 3 mg     midodrine (PROAMATINE) tablet 12.5 mg     naloxone (NARCAN) injection 0.1-0.4 mg     nitroGLYcerin (NITROSTAT) sublingual tablet 0.4 mg     ondansetron (ZOFRAN-ODT) ODT tab 4 mg    Or     ondansetron (ZOFRAN) injection 4 mg     pantoprazole (PROTONIX) EC tablet 20 mg     polyethylene glycol (MIRALAX) Packet 17 g     potassium chloride ER (MICRO-K) CR capsule 20 mEq     sennosides (SENOKOT) tablet 2 tablet     sodium chloride (PF) 0.9% PF flush 3 mL     sodium chloride (PF) 0.9% PF flush 3 mL     umeclidinium (INCRUSE ELLIPTA) 62.5 MCG/INH inhaler 1 puff      2

## 2020-05-24 NOTE — PLAN OF CARE
0045 resting in bed  denies pain able to roll from side to sdie to help clean after incontinent episode. 0535 checking patients blood pressure  Lying 113over 49.  Unable to sit at side of bed dizzy and lightheaded times 3 tries . Patient upset.    And angry blaming staff for inability to sit at edge of bed  Wants to get blood pressure checks done later in morning  Told patient that would be fine .   Wants them done at 8 p.m.  Informed patient it was not staff or patients  fault that  Blood pressure  Goes low and patient gets lightheaded and dizzy and needs to lay down.  Remains angry about  Low blood pressure  0634  Patient apologizes  For earlier behavior  Thank patient  For apology  explained the understanding of the frustration that patient  has  Explained were working together  Patient understanding      Face to face report given with opportunity to observe patient.    Report given to janice Hill RN   5/24/2020  7:16 AM

## 2020-05-24 NOTE — PLAN OF CARE
Reason for hospital stay:  Syncope    Most recent vitals: /52 (BP Location: Left arm)   Pulse 55   Temp 96.3  F (35.7  C) (Tympanic)   Resp 18   Ht 1.829 m (6')   Wt 92 kg (202 lb 13.2 oz)   SpO2 94%   BMI 27.51 kg/m      Pain Management:  denies  LOC:  A&O  Cardiac:  Per icu tele report 60's PACs and BBB  Respiratory:  LS coarse with crackles in lower bases  GI:  incontinent at times  :  Chronic trinh- adequate output  IVF:  saline locked  Nutrition: regular  Safety:  Bed in low position/call light in place. Remains free of falls this shift.    Comments:  Unable to complete Orthostatic Bp as pt became lightheaded when attempting to stand.   Pt has had periods of agitation this shift with staff.      5/24/2020  6:40 PM  Adrienne Salinas RN    Face to face report given with opportunity to observe patient.    Report given to CHRIS Carr RN   5/24/2020  11:20 PM

## 2020-05-24 NOTE — PROGRESS NOTES
[facility]    Medicine Progress Note - Hospitalist Service       Date of Admission:  5/14/2020  Assessment & Plan   Lamont Doran is a 74 year old  man who was admitted on 5/14/2020.  He has been at [facility] for rehabilitation.  While there, he had several episodes of syncope  felt to be orthostatic in nature.  He was recently evaluated extensively at [facility] 3/14-19.  Echocardiogram showed normal systolic function without evident structural abnormality.  CTPA, showing no pulmonary embolism.  MRI of the brain was negative.  Telemetry showed sinus with PACs.    He did have some hemoptysis while he was there for which he  underwent bronchoscopy that showed alveolar hemorrhage, and polymicrobial sputum was noted and vasculitis workup was neg.    Subsequently he underwent a surgical procedure at [facility] on 05/11 with a left common and left external iliac angioplasty and stents placed by Dr. George in hope that the improved blood flow would improve his ambulation.    Apparently lower extremity compression hose were not resumed.  He is always somewhat dyspneic because he does have COPD.  able to walk short distances with PT with a wheelchair behind him.  He has the following Acute medical issues:     1.  Syncopal episodes with severe hypotension: Continues to have syncopal episodes. Will do a cosyntropin stim test.      5/20 had RRT was called after he was slumped in chair and ashen after he went to shower without o2---he had desaturated into low 70's with BP 55/32 initially, responded quickly to oxygenation and BP quickly improved in <1 minute with getting back into bed before IVF were begun     Remains with severe orthostatic hypotension.    Tele c/w PAC's  Currently on max dose miodrine  Will discontinue metoprolol  He needs some lasix as otherwise he gets severely volume overloaded as demonstarted by multiple cxrays  tamsulosin and finasteride have been placed on hold in case  effecting orthostasis  cont compression hose  Will discontinue his home seroquel as this too can cause orthostasis  Started on Florinef in addition to midodrine.  Will need neurology follow-up as an outpatient.  TSH was normal.  I also suspect adrenal insufficiency.  Will get ACTH stim test.     2/ acute on chronic hypoxic resp failure  Also has needed intermittent increased oxygen earlier this admission when pulmonary edema worsened when he was off lasix  Started on IV lasix 40mg tid.   On 3liters at home chronically  on abx, as acute alveolar process, currently on doxycycline as gm pos cocci in sputum initially reported now reported as yeast in culture.  Will complete 5 days abx     3.  Urinary retention  Chronic urethral catheter will need to reschedule with urology  proscar and tamsulosin discontinued in case contributing to orthostasis    4  Acute on chronic HFpEF  Monitor intake and output with daily weights.   The patient still has bibasilar crackles.  Continue on oxygen supplementation. Diurese with IV lasix.      5.  Chronic obstructive pulmonary disease with exacerbation, and possible lower tract disease, added doxycycline  Continue inhalers    6.  Peripheral vascular disease.  cad pci 2013, bilat cea  He just underwent a left internal and external iliac stenting.  No signs of any ischemia.    Cont asa, statin, plavix    7.  Generalized weakness and gait instability  Greatly complicated by his orthostatic hypotension.  Continued physical therapy and Occupational Therapy to the extent this is possible given  His severe  orthostatic hypotension     8 recent hemptysis with bronch showing alveaolar hemorrhage, had negative vasculitis workup, repeat vasculitis workup as still with minimal hemoptysis--GBM/anca neg, jose a pending   -negative    9 electrolyte derangement replace potassium, magnesium    10 gerd on protonix    11 troponinemia, likely from demand ischemia from volume overloaded status    12 severe prot  calorie malnutrition with 3rd spacing which may lead to intravascular volume depletion,    13 normocytic anemia, check iron studies--ok, b12/folate--ok    14 insomnia--discontinue seroquel and use melatonin      15. Severe malnutrition ruled out,       Malnutrition Diagnosis: Patient does not meet two of the criteria necessary for diagnosing malnutrition. Noted weight loss, pt on lasix at home and has been in short term rehab which may have increased his activity level. Intake has been good.       Disposition: He is still medically not stable to go back to the nursing home.  He is hypotensive, has pulmonary edema and struggling with severe weakness.    Diet: Regular Diet Adult    DVT Prophylaxis: on DAPT with minimal hemoptysis, avoid pharmacologic  Clifton Catheter: in place, indication: Retention  Code Status: DNR/DNI           Disposition Plan   Expected discharge:The patient is very symptomatic with orthostasis and recurrent presyncope. Cannot discharge him to NH as he will come back to the ER  Entered: Mark Tran MD 05/24/2020, 10:07 AM       The patient's care was discussed with the Patient.    Mark Tran MD  Hospitalist Service  [facility]    ______________________________________________________________________    Interval History    The patient was seen and examined.  Overnight events reviewed.  Discussed with the nursing staff.  He had another episode of syncope today while trying to get up from the bed. Continue to be orthostatic with very low blood pressures.    It is hard for him to stand up due to orthostatic hypotension.  Denies chest pain.    Data reviewed today: I reviewed all medications, new labs and imaging results over the last 24 hours.    CXR  IMPRESSION: No significant change in the appearance of the chest.  Findings may be due to pulmonary edema or diffuse pneumonia.    Physical Exam   Vital Signs: Temp: 97.5  F (36.4  C) Temp src: Temporal BP: (!) 95/48 Pulse: 55 Heart Rate: 54  Resp: 20 SpO2: 92 % O2 Device: Nasal cannula Oxygen Delivery: 3 LPM  Weight: 202 lbs 13.17 oz  Constitutional: Looks short of breath  Respiratory: Bibasilar fine crackles  Cardiovascular: RRR; normal s1 s2  GI: soft, nt, nd  Skin: no bruising or bleeding  Musculoskeletal: age appropriate muscle mass  Neurologic: Awake, alert, oriented to name, place and time.  Moving extremities; appears at baseline state.      Data   Recent Labs   Lab 05/24/20  0531 05/23/20  0552 05/22/20  0518 05/21/20  0542 05/20/20  0521  05/17/20  1155   WBC 9.6 11.0 8.8  --  8.2   < >  --    HGB 10.3* 10.4* 10.2*  --  9.9*   < >  --    MCV 80 81 81  --  81   < >  --     169 162  --  157   < >  --     140 140 141 140   < >  --    POTASSIUM 3.8 3.6 4.2 4.0 3.7   < >  --    CHLORIDE 111* 111* 110* 109 109   < >  --    CO2 26 26 28 29 29   < >  --    BUN 28 29 28 26 23   < >  --    CR 0.86 0.83 0.88 0.88 0.94   < >  --    ANIONGAP 2* 3 2* 3 2*   < >  --    CLARA 8.6 8.4* 8.5 8.6 8.3*   < >  --    * 105* 114* 123* 103*   < >  --    ALBUMIN  --   --   --  2.4* 1.9*   < >  --    PROTTOTAL  --   --   --  5.7* 5.5*   < >  --    BILITOTAL  --   --   --  0.4 0.4   < >  --    ALKPHOS  --   --   --  58 58   < >  --    ALT  --   --   --  14 13   < >  --    AST  --   --   --  10 10   < >  --    TROPI  --   --   --   --   --   --  0.055*    < > = values in this interval not displayed.     Current Facility-Administered Medications   Medication     acetaminophen (TYLENOL) Suppository 650 mg     acetaminophen (TYLENOL) tablet 650 mg     aspirin EC tablet 162 mg     atorvastatin (LIPITOR) tablet 80 mg     clopidogrel (PLAVIX) tablet 75 mg     cosyntropin (CORTROSYN) injection 250 mcg     doxycycline hyclate (VIBRA-TABS) tablet 100 mg     fludrocortisone (FLORINEF) tablet 100 mcg     fluticasone-vilanterol (BREO ELLIPTA) 200-25 MCG/INH inhaler 1 puff     furosemide (LASIX) injection 40 mg     guaiFENesin-dextromethorphan (ROBITUSSIN DM) 100-10  MG/5ML syrup 5 mL     ipratropium - albuterol 0.5 mg/2.5 mg/3 mL (DUONEB) neb solution 3 mL     lidocaine (LMX4) kit     lidocaine 1 % 0.1-1 mL     melatonin tablet 3 mg     midodrine (PROAMATINE) tablet 12.5 mg     naloxone (NARCAN) injection 0.1-0.4 mg     nitroGLYcerin (NITROSTAT) sublingual tablet 0.4 mg     ondansetron (ZOFRAN-ODT) ODT tab 4 mg    Or     ondansetron (ZOFRAN) injection 4 mg     pantoprazole (PROTONIX) EC tablet 20 mg     polyethylene glycol (MIRALAX) Packet 17 g     potassium chloride ER (MICRO-K) CR capsule 20 mEq     sennosides (SENOKOT) tablet 2 tablet     sodium chloride (PF) 0.9% PF flush 3 mL     sodium chloride (PF) 0.9% PF flush 3 mL     umeclidinium (INCRUSE ELLIPTA) 62.5 MCG/INH inhaler 1 puff

## 2020-05-24 NOTE — PLAN OF CARE
On initial assessment, patient did have a syncopal episode, which quickly resolved, MD did see patient, the cosyntropin is on order, and per pharmacy this medication should be available sometime today. Patient denies having dizziness or lightheadedness, neuros are intact at present, however this morning with patients syncopal episode, the right eye deviated to the right, this has resolved as well, patient alert oriented and makes his needs known, patient has denied nausea, does use his call light to alert staff of his needs.

## 2020-05-24 NOTE — PLAN OF CARE
Face to face report given with opportunity to observe patient.    Report given to Adrienne Torres, RN   5/24/2020  3:32 PM

## 2020-05-24 NOTE — PHARMACY
"[facility]    Pharmacy      Antimicrobial Stewardship Note     Current antimicrobial therapy:  Anti-infectives (From now, onward)    Start     Dose/Rate Route Frequency Ordered Stop    05/19/20 1000  doxycycline hyclate (VIBRA-TABS) tablet 100 mg      100 mg Oral EVERY 12 HOURS SCHEDULED 05/19/20 0910            Indication: CAP     Days of Therapy: 6     Pertinent labs:  Creatinine   Creatinine   Date Value Ref Range Status   05/24/2020 0.86 0.66 - 1.25 mg/dL Final   05/23/2020 0.83 0.66 - 1.25 mg/dL Final   05/22/2020 0.88 0.66 - 1.25 mg/dL Final     WBC   WBC   Date Value Ref Range Status   05/24/2020 9.6 4.0 - 11.0 10e9/L Final   05/23/2020 11.0 4.0 - 11.0 10e9/L Final   05/22/2020 8.8 4.0 - 11.0 10e9/L Final     Procalcitonin   Procalcitonin   Date Value Ref Range Status   05/21/2020 0.06 ng/ml Final     Comment:     0.05-0.24 ng/ml Low risk of systemic bacterial infection. Local bacterial   infection possible.  Recommendation: Assess other clinical features of   infection. Discourage antibiotics unless strong clinical suspicion for serious   infection.     05/17/2020 0.07 ng/ml Final     Comment:     0.05-0.24 ng/ml Low risk of systemic bacterial infection. Local bacterial   infection possible.  Recommendation: Assess other clinical features of   infection. Discourage antibiotics unless strong clinical suspicion for serious   infection.       CRP   CRP Inflammation   Date Value Ref Range Status   05/17/2020 11.6 (H) 0.0 - 8.0 mg/L Final   01/31/2015 <2.9 0.0 - 8.0 mg/L Final       Culture Results:   (5/17/20) Sputum = Candida tropicalis  (5/17/20) Gram Stain = GPC  (5/17/20) Blood  (5/14/20) COVID = negative     Recommendations/Interventions:  1. Stop today; per hospitalist note today, \"Will complete 5 days abx\"       Aguilar Garcia, [facility]  May 24, 2020        "

## 2020-05-24 NOTE — PLAN OF CARE
Reason for hospital stay:  Syncope    Most recent vitals: /66 (BP Location: Left arm)   Pulse 56   Temp 96.5  F (35.8  C) (Temporal)   Resp 18   Ht 1.829 m (6')   Wt 91.2 kg (201 lb 1 oz)   SpO2 94%   BMI 27.27 kg/m      Pain Management:  denies  LOC:  A&O  Cardiac:  Sinus 60s with PAC and PVC with BBB.  Respiratory:  Dyspneic on exertion LS coarse  GI:  Soft stools  :  Chronic trinh      IVF:  Saline locked  Nutrition: regular increased Na    Ambulation:Gait belt, walker assist x2.  Safety:  Call light in reach, bed in low position, remains free of falls.        Comments:      5/23/2020  8:21 PM  Adrienne Salinas RN     Face to face report given with opportunity to observe patient.    Report given to CHRIS Carr RN   5/23/2020  11:33 PM

## 2020-05-24 NOTE — PLAN OF CARE
MD updated, patient did pass out twice this morning, after attempting patient to sit up, three staff members were assisting patient, neuros were done, and the right eye was deviated to the right, patient was unable to follow this writers fingers with his eyes, patient was able to quickly clear, patient was unable to adequately describe what he was feeling. MD did state he is on his way to see patient.

## 2020-05-25 NOTE — PROGRESS NOTES
[facility]    Medicine Progress Note - Hospitalist Service       Date of Admission:  5/14/2020  Assessment & Plan   Lamont Doran is a 74 year old  man who was admitted on 5/14/2020.  He has been at [facility] for rehabilitation.  While there, he had several episodes of syncope  felt to be orthostatic in nature.  He was recently evaluated extensively at [facility] 3/14-19.  Echocardiogram showed normal systolic function without evident structural abnormality.  CTPA, showing no pulmonary embolism.  MRI of the brain was negative.  Telemetry showed sinus with PACs.    He did have some hemoptysis while he was there for which he  underwent bronchoscopy that showed alveolar hemorrhage, and polymicrobial sputum was noted and vasculitis workup was neg.    Subsequently he underwent a surgical procedure at [facility] on 05/11 with a left common and left external iliac angioplasty and stents placed by Dr. George in hope that the improved blood flow would improve his ambulation.    Apparently lower extremity compression hose were not resumed.  He is always somewhat dyspneic because he does have COPD.  able to walk short distances with PT with a wheelchair behind him.  He has the following Acute medical issues:     1.  Syncopal episodes with severe hypotension: Continues to have syncopal episodes. Will do a cosyntropin stim test.      5/20 had RRT was called after he was slumped in chair and ashen after he went to shower without o2---he had desaturated into low 70's with BP 55/32 initially, responded quickly to oxygenation and BP quickly improved in <1 minute with getting back into bed before IVF were begun     Remains with severe orthostatic hypotension.    Tele c/w PAC's  Currently on max dose miodrine  Will discontinue metoprolol  He needs some lasix as otherwise he gets severely volume overloaded as demonstarted by multiple cxrays  tamsulosin and finasteride have been placed on hold in case  effecting orthostasis  cont compression hose  Will discontinue his home seroquel as this too can cause orthostasis  Started on Florinef in addition to midodrine.  Will need neurology follow-up as an outpatient.  TSH was normal.  I also suspect adrenal insufficiency.  Cosyntropin stim test done. Cortisol level was 5 after 60 minutes of cosyntropin. I will start the patient on stress dose of steroids.     2/ Acute on chronic hypoxic resp failure  Also has needed intermittent increased oxygen earlier this admission when pulmonary edema worsened when he was off lasix  Started on IV lasix 40mg tid -- switched to PO now as   On 3liters at home chronically  on abx, as acute alveolar process,Completed a course of doxy     3.  Urinary retention  Chronic urethral catheter will need to reschedule with urology  proscar and tamsulosin discontinued in case contributing to orthostasis    4  Acute on chronic HFpEF  Monitor intake and output with daily weights.   The patient still has bibasilar crackles.  Continue on oxygen supplementation. On lasix.      5.  Chronic obstructive pulmonary disease with exacerbation, and possible lower tract disease, added doxycycline  Continue inhalers    6.  Peripheral vascular disease.  cad pci 2013, bilat cea  He just underwent a left internal and external iliac stenting.  No signs of any ischemia.    Cont asa, statin, plavix    7.  Generalized weakness and gait instability  Greatly complicated by his orthostatic hypotension.  Continued physical therapy and Occupational Therapy to the extent this is possible given  His severe  orthostatic hypotension     8 Recent hemptysis with bronch showing alveaolar hemorrhage, had negative vasculitis workup, repeat vasculitis workup as still with minimal hemoptysis--GBM/anca neg, jose a pending   -negative    9 Electrolyte derangement replace potassium, magnesium    10 Gerd on protonix    11 Troponinemia, likely from demand ischemia from volume overloaded  status    12 Severe prot calorie malnutrition with 3rd spacing which may lead to intravascular volume depletion,    13 Mormocytic anemia, check iron studies--ok, b12/folate--ok    14 Insomnia--discontinue seroquel and use melatonin      15. Severe malnutrition ruled out,       Malnutrition Diagnosis: Patient does not meet two of the criteria necessary for diagnosing malnutrition. Noted weight loss, pt on lasix at home and has been in short term rehab which may have increased his activity level. Intake has been good.     Diet: Regular Diet Adult    DVT Prophylaxis: on DAPT with minimal hemoptysis, avoid pharmacologic  Clifton Catheter: in place, indication: Retention  Code Status: DNR/DNI           Disposition Plan   Expected discharge:The patient is very symptomatic with orthostasis and recurrent presyncope. Cannot discharge him to NH as he will come back to the ER. The patient would like to be transferred to [place] but there are insurance issues.  is working on it.   Entered: Mark Tran MD 05/25/2020, 5:11 PM       The patient's care was discussed with the Patient.  Discussed with the wife over the phone.   Also left a message for the daughter Patricia [phone].     Mark Tran MD  Hospitalist Service  [facility]    ______________________________________________________________________    Interval History    The patient was seen and examined.  Overnight events reviewed.  Discussed with the nursing staff.  He continues to have presyncope  while trying to get up from the bed. Continue to be orthostatic with very low blood pressures.    It is hard for him to stand up due to orthostatic hypotension.  Denies chest pain.    Data reviewed today: I reviewed all medications, new labs and imaging results over the last 24 hours.    CXR  IMPRESSION: No significant change in the appearance of the chest.  Findings may be due to pulmonary edema or diffuse pneumonia.    Physical Exam   Vital Signs: Temp:  97.7  F (36.5  C) Temp src: Temporal BP: (!) 87/45 Pulse: 61 Heart Rate: 62 Resp: 16 SpO2: 92 % O2 Device: Nasal cannula Oxygen Delivery: 3 LPM  Weight: 201 lbs 11.53 oz  Constitutional: Looks short of breath  Respiratory: Bibasilar fine crackles  Cardiovascular: RRR; normal s1 s2  GI: soft, nt, nd  Skin: no bruising or bleeding  Musculoskeletal: age appropriate muscle mass  Neurologic: Awake, alert, oriented to name, place and time.  Moving extremities; appears at baseline state.      Data   Recent Labs   Lab 05/25/20  0520 05/24/20  0531 05/23/20  0552  05/21/20  0542 05/20/20  0521   WBC 8.2 9.6 11.0   < >  --  8.2   HGB 10.5* 10.3* 10.4*   < >  --  9.9*   MCV 81 80 81   < >  --  81    166 169   < >  --  157    139 140   < > 141 140   POTASSIUM 3.6 3.8 3.6   < > 4.0 3.7   CHLORIDE 104 111* 111*   < > 109 109   CO2 30 26 26   < > 29 29   BUN 32* 28 29   < > 26 23   CR 1.06 0.86 0.83   < > 0.88 0.94   ANIONGAP 5 2* 3   < > 3 2*   CLARA 8.5 8.6 8.4*   < > 8.6 8.3*   * 114* 105*   < > 123* 103*   ALBUMIN  --   --   --   --  2.4* 1.9*   PROTTOTAL  --   --   --   --  5.7* 5.5*   BILITOTAL  --   --   --   --  0.4 0.4   ALKPHOS  --   --   --   --  58 58   ALT  --   --   --   --  14 13   AST  --   --   --   --  10 10    < > = values in this interval not displayed.     Current Facility-Administered Medications   Medication     acetaminophen (TYLENOL) Suppository 650 mg     acetaminophen (TYLENOL) tablet 650 mg     aspirin EC tablet 162 mg     atorvastatin (LIPITOR) tablet 80 mg     clopidogrel (PLAVIX) tablet 75 mg     fludrocortisone (FLORINEF) tablet 100 mcg     fluticasone-vilanterol (BREO ELLIPTA) 200-25 MCG/INH inhaler 1 puff     furosemide (LASIX) injection 40 mg     guaiFENesin-dextromethorphan (ROBITUSSIN DM) 100-10 MG/5ML syrup 5 mL     hydrocortisone sodium succinate PF (solu-CORTEF) injection 100 mg     ipratropium - albuterol 0.5 mg/2.5 mg/3 mL (DUONEB) neb solution 3 mL     lidocaine (LMX4)  kit     lidocaine 1 % 0.1-1 mL     melatonin tablet 3 mg     midodrine (PROAMATINE) tablet 12.5 mg     naloxone (NARCAN) injection 0.1-0.4 mg     nitroGLYcerin (NITROSTAT) sublingual tablet 0.4 mg     ondansetron (ZOFRAN-ODT) ODT tab 4 mg    Or     ondansetron (ZOFRAN) injection 4 mg     pantoprazole (PROTONIX) EC tablet 20 mg     polyethylene glycol (MIRALAX) Packet 17 g     potassium chloride ER (MICRO-K) CR capsule 20 mEq     sennosides (SENOKOT) tablet 2 tablet     sodium chloride (PF) 0.9% PF flush 3 mL     sodium chloride (PF) 0.9% PF flush 3 mL     umeclidinium (INCRUSE ELLIPTA) 62.5 MCG/INH inhaler 1 puff

## 2020-05-25 NOTE — PLAN OF CARE
VS as charted, afebrile, HRR - pt on telemetry, lungs clear however pt remains on 3L of O2 with saturations maintaining @ 92%,  bowels active. Pt is alert and oriented , however he can be somewhat forgetful. Pt remains dizzy when sitting up right at the edge of the bed. Lasix was held this afternoon due to his BP remaining low 87/45, pt asymptomatic at that time. Clifton catheter output was 1050 mL for the this shift, pt drinking and eating well.     Face to face report given with opportunity to observe patient.    Report given to CHRIS Mccullough RN   5/25/2020  3:16 PM

## 2020-05-25 NOTE — PLAN OF CARE
0030 denies pain does not want blood pressures done this morning wants to wait till after breakfast when he's more awake  no complaints at this time 0530  Denies pain.   Want to wait for blood pressure  Checks till after breakfast.        Face to face report given with opportunity to observe patient.    Report given to skylar Hill RN   5/25/2020  7:16 AM

## 2020-05-25 NOTE — PROGRESS NOTES
PT was deferred this date as pt is having signif dizziness with lying in bed. Nsg and patient agreed. Will attempt tomorrow.

## 2020-05-26 NOTE — PLAN OF CARE
Discharge Planner PT   Patient plan for discharge: Patient plans to return to SNF once medically stable  Current status: Seng/CGA for transfers, very low blood pressure 73/40.  Poor seated balance due to dizziness requiring ModA to stay upright in bed.  Able to stand for a very short period, and perform pivot transfer w/ CGA x 2, no falls or LOB while standing.    Barriers to return to prior living situation: Mod(I) for ambulation   Recommendations for discharge: back to SNF for strengthening and balance once medically stable  Rationale for recommendations: therapist discretion        Entered by: Serg Bullock 05/26/2020 1:46 PM

## 2020-05-26 NOTE — PLAN OF CARE
Reason for hospital stay:  Syncopal episodes   Living situation P[facility]: [facility]  Most recent vitals: /57   Pulse 61   Temp 97.4  F (36.3  C) (Temporal)   Resp 18   Ht 1.829 m (6')   Wt 91.1 kg (200 lb 13.4 oz)   SpO2 95%   BMI 27.24 kg/m      Pain Management:  denies  LOC:  A&O x4  Cardiac:  Apical pulse irregular, Denies chest pain  Respiratory:  Lungs clear and equal bilaterally. On 3 lpm nasal cannula maintaining stats in low to mid 90s. Dyspneic on exertion. Denies shortness of breath when at rest.  GI:  Bowel sounds present and active x4  :  Clifton cath in place for retention. Cath cares have been completed.   Skin Issues: No open areas noted edema in BLE has improved since yesterday. Noah socks are on. Scattered bruises throughout    IVF:  Saline locked    Nutrition: Regular good appetite   ADL's:  Assist of 1-2  Ambulation: Assist of 2 with gait belt and walker. Up to chair with PT.  Safety:  Pt free from injury and falls this shift. Call light within reach and pt uses appropriately. Alarms active      5/26/2020  1:45 PM  Dana Sousa RN    Face to face report given with opportunity to observe patient.    Report given to Carmen Sousa RN   5/26/2020  3:17 PM

## 2020-05-26 NOTE — PROGRESS NOTES
"CLINICAL NUTRITION SERVICES     Lamont Doran : follow up    74 yom admitted for episode of syncope. Pt has a hx of GERD, peripheral vascular disease, COPD, hyperlipidemia, hypertension, colonic polyps and borderline hyperglycemia. Noted 18lb weight loss in the past 2 months. Appetite/intake continues to be adequate.    Diet Order: Regular  Intake: % (mostly %)    Height: 6' 0\"  Weight: 200 lbs 13.42 oz  Body mass index is 27.24 kg/m .  Weight Status:  Overweight BMI 25-29.9  IBW: 77.6  Weight History: Noted weight trending down this admission 208 to 200lb. Most weights taken using bed scale. -14.9L per I/Os this admission.      Wt Readings from Last 10 Encounters:   05/19/20 94.8 kg (208 lb 15.9 oz)   03/13/20 102.9 kg (226 lb 13.7 oz)   04/14/17 108.4 kg (239 lb)   01/31/15 101.6 kg (224 lb)   03/31/14 107 kg (236 lb)   03/05/14 107.5 kg (237 lb)   02/05/14 108.9 kg (240 lb)   01/09/14 109.3 kg (241 lb)      Estimated Energy Needs: 7772-1358 kcals ([place] [address] Jeor- stress factor 1-1.2) - current weight 94.8kg  Estimated Protein Needs: 75-95g grams protein (1-1.2 g pro/Kg)- IBW 77.6    Malnutrition Diagnosis: Patient does not meet two of the criteria necessary for diagnosing malnutrition at this time. Unable to assess muscle/subcutaneous fat loss. Weight loss noted. Pt reported intake was good before admission and appetite continues to be good this admission. Noted pt diagnosed with severe protein malnutrition with 3rd spacing.    NUTRITION RECOMMENDATIONS  - Encourage intake    MONITORING AND EVALUATION:  RD will monitor intake, weight, labs        "

## 2020-05-26 NOTE — PLAN OF CARE
A&O w/ soft BP of 104/51. Refused orthos dt dizziness. Would like to do in the AM. Tele reading sinus misa 50s/sinus dysrhythmia w/ BBB per ICU nurse report. Satting 93-95% on 3 LPM w/ complaints of JOHNSON. LSs have expiratory wheezes to BULs. IV solu-cortef administered. Radial pulses 2+. Noah socks worn for most of shift. Total output from trinh of 500 mLs clear catherine-colored urine. Good appetite w/ 75% of dinner eaten. Bed in lowest position. Call light in reach. ID band in place.     Face to face report given with opportunity to observe patient.    Report given to Ann-Marie Tabor   5/25/2020  11:30 PM

## 2020-05-26 NOTE — PROGRESS NOTES
[facility]    Medicine Progress Note - Hospitalist Service       Date of Admission:  5/14/2020  Assessment & Plan   Lamont Doran is a 74 year old  man who was admitted on 5/14/2020.  He has been at [facility] for rehabilitation.  While there, he had several episodes of syncope  felt to be orthostatic in nature.  He was recently evaluated extensively at [facility] 3/14-19.  Echocardiogram showed normal systolic function without evident structural abnormality.  CTPA, showing no pulmonary embolism.  MRI of the brain was negative.  Telemetry showed sinus with PACs.    He did have some hemoptysis while he was there for which he  underwent bronchoscopy that showed alveolar hemorrhage, and polymicrobial sputum was noted and vasculitis workup was neg.    Subsequently he underwent a surgical procedure at [facility] on 05/11 with a left common and left external iliac angioplasty and stents placed by Dr. George in hope that the improved blood flow would improve his ambulation.    Apparently lower extremity compression hose were not resumed.  He is always somewhat dyspneic because he does have COPD.  able to walk short distances with PT with a wheelchair behind him.  He has the following Acute medical issues:     1.  Syncopal episodes with severe hypotension: Continues to have syncopal episodes. Cosyntropin stim test completed 5/24, suspicious for adrenal insufficiency.      5/20 had RRT was called after he was slumped in chair and ashen after he went to shower without o2---he had desaturated into low 70's with BP 55/32 initially, responded quickly to oxygenation and BP quickly improved in <1 minute with getting back into bed before IVF were begun     Remains with severe orthostatic hypotension.    Tele c/w PAC's  Currently on max dose miodrine  Metoprolol discontinued  Continue lasix as otherwise he gets severely volume overloaded as demonstarted by multiple cxrays  tamsulosin and finasteride  have been placed on hold in case effecting orthostasis  cont compression hose  Discontinued his home seroquel as this too can cause orthostasis  Started on Florinef in addition to midodrine.  Will need neurology follow-up as an outpatient.  TSH was normal.  Cosyntropin stim test done 5/24. Cortisol level was 5 after 60 minutes of cosyntropin. Patient started on hydrocortisone 5/25 in addition to fludrocortisone, patient is somewhat improved today.    2/ Acute on chronic hypoxic resp failure  Also has needed intermittent increased oxygen earlier this admission when pulmonary edema worsened when he was off lasix  Started on IV lasix 40mg tid -- switched to PO now  On 3liters at home chronically  on abx, as acute alveolar process,Completed a course of doxy     3.  Urinary retention  Chronic urethral catheter will need to reschedule with urology.  Keep trinh in for now.  proscar and tamsulosin discontinued in case contributing to orthostasis    4  Acute on chronic HFpEF  Monitor intake and output with daily weights.   The patient still has bibasilar crackles.  Continue on oxygen supplementation. On lasix, will give as bp allows.      5.  Chronic obstructive pulmonary disease with exacerbation, and possible lower tract disease, added doxycycline  Continue inhalers    6.  Peripheral vascular disease.  cad pci 2013, bilat cea  He just underwent a left internal and external iliac stenting.  No signs of any ischemia.    Cont asa, statin, plavix    7.  Generalized weakness and gait instability  Greatly complicated by his orthostatic hypotension.  Continued physical therapy and Occupational Therapy to the extent this is possible given  He has severe orthostatic hypotension     8 Recent hemptysis with bronch showing alveaolar hemorrhage, had negative vasculitis workup, repeat vasculitis workup as still with minimal hemoptysis--GBM/anca neg, jose a negative.    9 Electrolyte derangement replace potassium, magnesium    10 Gerd on  protonix    11 Troponinemia, likely from demand ischemia from volume overloaded status    12 Severe prot calorie malnutrition with 3rd spacing which may lead to intravascular volume depletion,    13 Mormocytic anemia, check iron studies--ok, b12/folate--ok    14 Insomnia--discontinue seroquel and use melatonin    Diet: Regular Diet Adult    DVT Prophylaxis: on DAPT with minimal hemoptysis, avoid pharmacologic  Clifton Catheter: in place, indication: Retention  Code Status: DNR/DNI           Disposition Plan   Expected discharge:The patient is very symptomatic with orthostasis and recurrent presyncope. Cannot discharge him to NH as he will come back to the ER. The patient would like to be transferred to [place] but there are insurance issues.  is working on it.   Entered: Nanci Ledesma MD, MD 05/26/2020, 8:58 AM       The patient's care was discussed with the Patient.    Nanci Ledesma MD, MD  Hospitalist Service  [facility]    ______________________________________________________________________    Interval History    The patient was seen and examined.  Overnight events reviewed.  Patient seems to be improving, states that his dizziness has improved.  BP initially low in the 80s systolic upon awakening.  Hemoptysis scant.  No new symptoms overnight.    Data reviewed today: I reviewed all medications, new labs and imaging results over the last 24 hours.    CXR  IMPRESSION: No significant change in the appearance of the chest.  Findings may be due to pulmonary edema or diffuse pneumonia.    Physical Exam   Vital Signs: Temp: 97.2  F (36.2  C) Temp src: Temporal BP: (!) 91/46 Pulse: 66 Heart Rate: 64 Resp: 19 SpO2: 93 % O2 Device: Nasal cannula Oxygen Delivery: 3 LPM  Weight: 200 lbs 13.42 oz  Constitutional: Looks short of breath  Respiratory: Bibasilar fine crackles  Cardiovascular: RRR; normal s1 s2  GI: soft, nt, nd  Skin: no bruising or bleeding  Musculoskeletal: age appropriate muscle  mass  Neurologic: Awake, alert, oriented to name, place and time.  Moving extremities; appears at baseline state.      Data   Recent Labs   Lab 05/26/20  0514 05/25/20  0520 05/24/20  0531  05/21/20  0542 05/20/20  0521   WBC 9.2 8.2 9.6   < >  --  8.2   HGB 10.5* 10.5* 10.3*   < >  --  9.9*   MCV 79 81 80   < >  --  81    190 166   < >  --  157    139 139   < > 141 140   POTASSIUM 3.6 3.6 3.8   < > 4.0 3.7   CHLORIDE 106 104 111*   < > 109 109   CO2 30 30 26   < > 29 29   BUN 34* 32* 28   < > 26 23   CR 1.01 1.06 0.86   < > 0.88 0.94   ANIONGAP 4 5 2*   < > 3 2*   CLARA 8.5 8.5 8.6   < > 8.6 8.3*   * 108* 114*   < > 123* 103*   ALBUMIN  --   --   --   --  2.4* 1.9*   PROTTOTAL  --   --   --   --  5.7* 5.5*   BILITOTAL  --   --   --   --  0.4 0.4   ALKPHOS  --   --   --   --  58 58   ALT  --   --   --   --  14 13   AST  --   --   --   --  10 10    < > = values in this interval not displayed.     Current Facility-Administered Medications   Medication     acetaminophen (TYLENOL) Suppository 650 mg     acetaminophen (TYLENOL) tablet 650 mg     aspirin EC tablet 162 mg     atorvastatin (LIPITOR) tablet 80 mg     clopidogrel (PLAVIX) tablet 75 mg     fludrocortisone (FLORINEF) tablet 100 mcg     fluticasone-vilanterol (BREO ELLIPTA) 200-25 MCG/INH inhaler 1 puff     furosemide (LASIX) tablet 40 mg     guaiFENesin-dextromethorphan (ROBITUSSIN DM) 100-10 MG/5ML syrup 5 mL     hydrocortisone sodium succinate PF (solu-CORTEF) injection 100 mg     ipratropium - albuterol 0.5 mg/2.5 mg/3 mL (DUONEB) neb solution 3 mL     lidocaine (LMX4) kit     lidocaine 1 % 0.1-1 mL     melatonin tablet 3 mg     midodrine (PROAMATINE) tablet 12.5 mg     naloxone (NARCAN) injection 0.1-0.4 mg     nitroGLYcerin (NITROSTAT) sublingual tablet 0.4 mg     ondansetron (ZOFRAN-ODT) ODT tab 4 mg    Or     ondansetron (ZOFRAN) injection 4 mg     pantoprazole (PROTONIX) EC tablet 20 mg     polyethylene glycol (MIRALAX) Packet 17 g      potassium chloride ER (MICRO-K) CR capsule 20 mEq     sennosides (SENOKOT) tablet 2 tablet     sodium chloride (PF) 0.9% PF flush 3 mL     sodium chloride (PF) 0.9% PF flush 3 mL     umeclidinium (INCRUSE ELLIPTA) 62.5 MCG/INH inhaler 1 puff

## 2020-05-26 NOTE — PLAN OF CARE
A&O, VSS (B/Ps 156/75 & 133/56), remains on 3 LPM O2 via NC. JOHNSON, lungs clear. AP irregular, Telemetry: SD 70's with BBB & 1 degree AVB per written report from ICU. Clifton in place for retention, 425 ml catherine urine out. Patient slept throughout the night, has not been out of bed. Alarms on, call light within reach, makes needs known.     Face to face report given with opportunity to observe patient.    Report given to Dana Rogers RN   5/26/2020  7:07 AM

## 2020-05-27 NOTE — PLAN OF CARE
Discharge Planner PT   Patient plan for discharge: short term rehab  Current status: Pt up in chair upon PT arrival, states he is feeling better today.  Pt's BP prior to activity while seated in chair 134/61.  Pt transferred sit>stand min A up to FWW.  After 2 minutes of standing, BP assessed and 74/35 and became symptomatic and lightheaded so returned to sitting.  After 2 minutes sitting BP reassessed and noted to be 114/56.  After additional 5 minutes pt wanting to go for a walk, provided education regarding risks of walking with ongoing orthostatic hypotension and symptoms, pt then agreeable to marching in place.  Pt transferred sit>stand min A again and worked on marching in place x2 minutes up to FWW then assessed BP and noted to be 73/40 with pt again becoming symptomatic and needing to sit down.    Barriers to return to prior living situation: orthostatic hypotension, weakness, fall risk  Recommendations for discharge: short term rehab  Rationale for recommendations: Pt will require close skilled PT to progress mobility safely while monitoring BP and symptoms.       Entered by: Abigail Gamez 05/27/2020 12:05 PM

## 2020-05-27 NOTE — PLAN OF CARE
Attempted to get a full set of orthostatic BPs, but pt became too dizzy upon standing.    Laying - 151/67 HR 61  Sitting - 156/59 - HR 60

## 2020-05-27 NOTE — PLAN OF CARE
A&O w/ elevated systolic BPs in the 160s, but otherwise VSS. Attempted ortho BPs, but pt was unable to complete the standing portion dt dizziness. Denies pain. Saline-locked. Satting 93% on 2 LPM w/ JOHNSON. LSs clear, equal bilaterally. IV solu-cortef D/Fausto by provider. Florinef dose doubled to 200 mcg. Apical irregular w/ tele reading SB 50s w/ 1st degree AVB/BBB w/ prolonged QT. Radial pulses normal. Pedal pulses weak. Total output from trinh of 450 mLs. Continues to be dizzy/weak upon standing. Will remain an assist of 2 w/ GB & walker. Up in chair for breakfast and lunch. Bed in lowest position. Call light in reach. ID band in place. Makes needs known.     Face to face report given with opportunity to observe patient.    Report given to Pavan Tabor   5/27/2020  3:15 PM

## 2020-05-27 NOTE — PLAN OF CARE
No falls or injuries this shift. No syncopal episodes this shift. Pleasant and cooperative. Clifton intact. O2 at 3L NC. Appetite good for dinner. Refused Senna thisd evening.    Face to face report given with opportunity to observe patient.    Report given to Bebe Claudio RN   5/26/2020  11:30 PM

## 2020-05-27 NOTE — PLAN OF CARE
Pain Management:  Denies pain, 0/10.  LOC:  A&Ox4.  Cardiac:  Apical pulse irregular. No edema noted. Skin is warm. /71 & 130/56. Per ICU RN, tele is SD/SB 50-60s with first degree AVB, BBB, Prolonged QT, Occ PVCs. Denies chest pain.  Respiratory:  Maintaining O2 in mid to high 90s on 3 LPM via NC. LS clear, equal throughout initially, course crackles heard in LLL at approximately 0657. JOHNSON noted when moving around in bed.  GI:  Bowel sounds active/audible x4. Soft/nontender abdomen. Denies GI symptoms.   :  Clifton catheter remains in place. Output: 500 mL.  Skin Issues:  Scattered bruising noted.    IVF:  IV is SL. Flushes well.  ABX:  None ordered.    Nutrition: Regular diet.  Ambulation: Ax2 with gait belt, walker.  Safety: Call light within reach. Bed in lowest position.     Face to face with opportunity to observe patient given to: Jose JONAS RN    5/27/2020  Bebe Omer RN

## 2020-05-28 NOTE — PLAN OF CARE
Called to room - pt found on the floor.  Physical therapy was in room and stated that they were transferring him from bed to wheelchair and pt became very week and they lowered him to his knees and then to his butt.  Pt was assisted back to wheelchair, VS as charted. Pt nurse notified and other notifications were made. No injury noted to patient.

## 2020-05-28 NOTE — PLAN OF CARE
Attempted to call pt's wife in regards to pt's fall w/ PT. Wife did not answer.    Will continue to call to attempt update her.    Jeimy & Patricia (daughters) will be called as well.

## 2020-05-28 NOTE — PLAN OF CARE
Discharge Planner PT   Patient plan for discharge: discharge back to [facility]  Current status: patient had a controlled fall during session.  Attempted to pivot 3 steps to reclinerkenney patient attempted to sit during transition requiring me to lower him to the floor  Barriers to return to prior living situation: ability to transfer  Recommendations for discharge: return to [facility], use mechanical lift for transfers for staff and patient safety  Rationale for recommendations: based on fall this day       Entered by: Serg Bullock 05/28/2020 11:38 AM

## 2020-05-28 NOTE — PLAN OF CARE
Reason for hospital stay:  Syncope    Most recent vitals: BP (!) 144/112   Pulse 56   Temp 96.5  F (35.8  C) (Temporal)   Resp 20   Ht 1.829 m (6')   Wt 90.4 kg (199 lb 4.7 oz)   SpO2 92%   BMI 27.03 kg/m      Pain Management:  Denied any pain    LOC:  A+O x4 - calls appropriately and makes needs known.     Cardiac:  AP irregular - Tele reading SR 60s with 1st degree AVB / BBB, prolonged QT per ICU nurse. Attempted to obtain orthostatic BP this evening per order but patient was unable to stand long enough to complete BP and passed out in EZ Stand lift. Quickly assisted back to sitting position in lift and patient regained consciousness in approx 30 seconds and was unaware that he passed out. BP once sitting back down was 144/112 with HR 86.     Respiratory:  Lungs clear in bases but scattered inspiratory wheezes in bilateral upper lobes. Sats 93% on 2 LPM. Does have dyspnea with minimal exertion.     GI:  WDL    :  Clifton patent and draining clear yellow urine.     IVF:  SL    Activity:  EZ Stand for transfers due to dizziness.     Safety:  Alarms on      Face to face report given with opportunity to observe patient.    Report given to Hermilo Dan RN   5/27/2020  11:20 PM

## 2020-05-28 NOTE — PLAN OF CARE
Patient discharged at 12:15 PM AM via wheel chair accompanied by staff. Prescriptions sent to patients preferred pharmacy. All belongings sent with patient.     Discharge instructions reviewed with [facility].     Patient discharged to [facility].   Report called to Nursing Home:  [facility]    Core Measures and Vaccines  Core Measures applicable during stay: No. Pneumonia and Influenza given prior to discharge, if indicated: N/A    MISC  Follow up appointment made:  No - to be made by NH  Home and hospital aquired medications returned to patient: N/A  Patient reports pain was well managed at discharge: Yes

## 2020-05-28 NOTE — PROGRESS NOTES
Name: Lamont Doran    MRN#: [id number]    Reason for Hospitalization: Orthostatic hypotension [I95.1]  NSTEMI (non-ST elevated myocardial infarction) (H) [I21.4]    LUIS: Low Risk    Discharge Date: May 28, 2020    Medicare IMM letter reviewed with patient on 5/28/2020 at time of discharge.    Patient / Family response to discharge plan: [facility] [place]    Follow-Up Appt: No future appointments.      Discharge Disposition: short-term care facility via [employer] [facility]    Pt or health care agent verbalized understanding of discharge plan.  All necessary communications/arrangements and contacts for discharge in place.      Sandra Garcia CM RN

## 2020-05-28 NOTE — PLAN OF CARE
Reason for hospital stay:  episodes of syncope   Most recent vitals: /55   Pulse 86   Temp 96.9  F (36.1  C) (Temporal)   Resp 20   Ht 1.829 m (6')   Wt 90.1 kg (198 lb 10.2 oz)   SpO2 92%   BMI 26.94 kg/m      Pain Management:  denied, will continue to assess  LOC:  A&O  Cardiac:  pt on telemetry, per ICU nurse SR 60s w/ 1 degree AVB, BBB, prolonged QT  Respiratory:  expiratory wheezes heard throughout, pt on 2L of oxygen to maintain SaO2, infrequent non-productive cough noted.   GI:  Bowels active and audible  :  Clifton catheter in place for urinary retention    IVF:  IV lock  ABX:  none    Nutrition: regular diet   ADL's:  Assist of 2  Safety:  Call light within reach, frequent rounding, non-slip socks on, bed alarms on    Face to face report given with opportunity to observe patient.    Report given to Jose Garcia RN   5/28/2020  7:44 AM

## 2020-05-28 NOTE — DISCHARGE SUMMARY
[facility]    Discharge Summary  Hospitalist    Date of Admission:  5/14/2020  Date of Discharge:  5/28/2020  Discharging Provider: Nanci Ledesma MD  Date of Service (when I saw the patient): 05/28/20    Discharge Diagnoses   Active Problems:    Episode of syncope (5/14/2020)    Peripheral vascular disease (H) (5/14/2020)    Syncope (5/14/2020)    Acute on chronic respiratory failure with hypoxemia (H) (5/17/2020)      History of Present Illness   Lamont Doran is an 74 year old male who presented with syncope.  Please see admission H+P for additional details.    Hospital Course   Lamont Doran was admitted on 5/14/2020.  74-year-old male resident [facility] admitted here for recurrent syncope.He was recently evaluated extensively at [facility] 3/14-19.  Echocardiogram showed normal systolic function without evident structural abnormality.  CTPA, showing no pulmonary embolism.  MRI of the brain was negative.  Telemetry showed sinus with PACs.  He did have some hemoptysis while he was there for which he  underwent bronchoscopy that showed alveolar hemorrhage, and polymicrobial sputum was noted and vasculitis workup was neg.  Subsequently he underwent a surgical procedure at [facility] on 05/11 with a left common and left external iliac angioplasty and stents placed by Dr. George in hope that the improved blood flow would improve his ambulation.  Apparently lower extremity compression hose were not resumed.  He is always somewhat dyspneic because he does have COPD, he is chronically on 2 L.  CTA of head and neck as well as chest this admission was negative for any sort of acute pathology to explain his syncopal episodes.  The patient needs to be on Lasix due to chronic swelling and his CHF.  Lasix was stopped for a time, however he could not tolerate this, with increased shortness of breath and bibasilar crackles.  Other medications were adjusted as well, he was taking both Proscar  and Flomax.  These were discontinued, and a Clifton catheter was placed.  He is unable to void spontaneously, so he will need to follow-up with urology regarding this.  Proscar and Flomax can possibly cause orthostasis as they are alpha inhibitors.  In fact, speaking of alpha receptors, he was started on midodrine, and titrated up to 12.5 mg 3 times daily.  Despite this, he has still profoundly orthostatic with sitting up from a laying down position and standing up from a sitting position.  The effect is quite profound, and that has caused loss of consciousness, even while here.  He underwent corticotropin stimulation test on 5/24, which was suspicious for adrenal insufficiency.  After 1 hour his cortisol level was low, however after 6 hours, response seemed to be appropriate.  He was however started empirically on some steroids, which initially did seem to help somewhat.  However, despite increasing steroid dose to 200 mcg of Florinef daily, he still is profoundly orthostatic.  At this point, we have reached a capability of diagnostic testing at this facility.  Unfortunately, he would not be accepted in another acute care setting due to his stability.  Patient expresses frustration at this fact, however he does desire to be worked up at the [facility].  We will give him referrals to cardiology, neurology, as well as endocrinology.  At this point he will be sent back to [facility].  He will have PT and OT working with him, keeping in mind the fact that he is rather orthostatic and he may syncopized due to this.    Nanci Ledesma MD      Significant Results and Procedures   See below    Pending Results   These results will be followed up by Jordin Aleman    Unresulted Labs Ordered in the Past 30 Days of this Admission     Date and Time Order Name Status Description    5/24/2020 0721 Adrenal corticotropin In process           Code Status   DNR / DNI       Primary Care Physician   Jordin GARCIA  Ash    Physical Exam   Temp: 96.9  F (36.1  C) Temp src: Tympanic BP: (!) 102/43 Pulse: 86 Heart Rate: 56 Resp: 20 SpO2: 92 % O2 Device: Nasal cannula Oxygen Delivery: 2 LPM  Vitals:    05/26/20 0458 05/27/20 0606 05/28/20 0510   Weight: 91.1 kg (200 lb 13.4 oz) 90.4 kg (199 lb 4.7 oz) 90.1 kg (198 lb 10.2 oz)     Vital Signs with Ranges  Temp:  [96.5  F (35.8  C)-96.9  F (36.1  C)] 96.9  F (36.1  C)  Pulse:  [56-86] 86  Heart Rate:  [50-59] 56  Resp:  [18-20] 20  BP: (102-160)/() 102/43  SpO2:  [92 %-94 %] 92 %  I/O last 3 completed shifts:  In: 880 [P.O.:880]  Out: 900 [Urine:900]    Constitutional: AA, NAD  Eyes: PERRLA, no injection, no icterus  HEENT: atraumatic, normocephalic  Respiratory: Decreased breath sounds bilaterally  Cardiovascular: S1 S2 RRR  GI: soft, NT, ND, + bowel sounds  Lymph/Hematologic: no palpable lymphadenopathy  Skin: no rashes, no lesions  Musculoskeletal: 1-2+ pitting edema lower extremities, good tone, no deformities  Neurologic: oriented x 3, no focal deficits  Psychiatric: appropriate affect    Discharge Disposition   Discharged to nursing home  Condition at discharge: Stable    Consultations This Hospital Stay   PHYSICAL THERAPY ADULT IP CONSULT  PHYSICAL THERAPY ADULT IP CONSULT  OCCUPATIONAL THERAPY ADULT IP CONSULT  PHYSICAL THERAPY ADULT IP CONSULT  OCCUPATIONAL THERAPY ADULT IP CONSULT    Time Spent on this Encounter   Nanci PROCTOR MD, personally saw the patient today and spent greater than 30 minutes discharging this patient.    Discharge Orders      CARDIOLOGY EVAL ADULT REFERRAL      NEUROLOGY ADULT REFERRAL      ENDOCRINOLOGY ADULT REFERRAL      General info for SNF    Length of Stay Estimate: Long Term Care  Condition at Discharge: Stable  Level of care:skilled   Rehabilitation Potential: Poor  Admission H&P remains valid and up-to-date: Yes  Recent Chemotherapy: N/A  Use Nursing Home Standing Orders: Yes     Mantoux instructions    Give two-step Mantoux (PPD)  Per Facility Policy Yes     Reason for your hospital stay    syncope     Intake and output    Every shift     Daily weights    Call Provider for weight gain of more than 2 pounds per day or 5 pounds per week.     Follow Up and recommended labs and tests    Follow up with Nursing home physician.  No follow up labs or test are needed.  Follow up with urology Dr. Philip 1-2 weeks for trinh, urinary retention.  Follow up with neurology, cardiology, endocrinology at the [facility] first available appointments.  See referrals.     Activity - Up with nursing assistance     Trinh catheter    To straight gravity drainage. Change catheter every 2 weeks and PRN for leaking or decreased uring output with signs of bladder distention. DO NOT change catheter without a specific MD order IF diagnosis of benign prostatic hypertrophy (BPH), neurogenic bladder, or other urological conditions     DNR/DNI     Physical Therapy Adult Consult    Evaluate and treat as clinically indicated.    Reason:  orthostasis     Occupational Therapy Adult Consult    Evaluate and treat as clinically indicated.    Reason:  orthostasis     Fall precautions     Oxygen Adult/Peds     Advance Diet as Tolerated    Follow this diet upon discharge: Orders Placed This Encounter      Regular Diet Adult     Discharge Medications   Current Discharge Medication List      START taking these medications    Details   fludrocortisone (FLORINEF) 0.1 MG tablet Take 2 tablets (0.2 mg) by mouth daily  Qty: 60 tablet, Refills: 3    Associated Diagnoses: Syncope, unspecified syncope type         CONTINUE these medications which have CHANGED    Details   midodrine (PROAMATINE) 2.5 MG tablet Take 5 tablets (12.5 mg) by mouth 3 times daily Doses may be given in approximately 3 to 4 hour intervals. Avoid dosing after the evening meal or within 4 hours of bedtime.  Qty: 450 tablet, Refills: 3    Associated Diagnoses: Syncope, unspecified syncope type         CONTINUE these  medications which have NOT CHANGED    Details   aspirin 81 MG EC tablet Take 162 mg by mouth daily       atorvastatin (LIPITOR) 80 MG tablet Take 80 mg by mouth every evening       budesonide-formoterol (SYMBICORT) 160-4.5 MCG/ACT Inhaler Inhale 2 puffs into the lungs 2 times daily      clopidogrel (PLAVIX) 75 MG tablet Take 75 mg by mouth daily       furosemide (LASIX) 20 MG tablet Take 60 mg by mouth daily       LANsoprazole (PREVACID) 15 MG DR capsule Take 15 mg by mouth daily      potassium chloride ER (K-TAB) 20 MEQ CR tablet Take 20 mEq by mouth daily      QUEtiapine (SEROQUEL) 25 MG tablet Take 25 mg by mouth At Bedtime       tiotropium (SPIRIVA RESPIMAT) 2.5 MCG/ACT inhaler Inhale 1 puff into the lungs daily       albuterol (PROAIR HFA/PROVENTIL HFA/VENTOLIN HFA) 108 (90 Base) MCG/ACT inhaler Inhale 2 puffs into the lungs every 4 hours as needed       loperamide (IMODIUM) 2 MG capsule Take 2 mg by mouth every 2 hours as needed for diarrhea          STOP taking these medications       finasteride (PROSCAR) 5 MG tablet Comments:   Reason for Stopping:         metoprolol succinate ER (TOPROL-XL) 25 MG 24 hr tablet Comments:   Reason for Stopping:         tamsulosin (FLOMAX) 0.4 MG capsule Comments:   Reason for Stopping:             Allergies   Allergies   Allergen Reactions     No Clinical Screening - See Comments      Peas and liver     Data   Most Recent 3 CBC's:  Recent Labs   Lab Test 05/26/20  0514 05/25/20  0520 05/24/20  0531   WBC 9.2 8.2 9.6   HGB 10.5* 10.5* 10.3*   MCV 79 81 80    190 166      Most Recent 3 BMP's:  Recent Labs   Lab Test 05/26/20  0514 05/25/20  0520 05/24/20  0531    139 139   POTASSIUM 3.6 3.6 3.8   CHLORIDE 106 104 111*   CO2 30 30 26   BUN 34* 32* 28   CR 1.01 1.06 0.86   ANIONGAP 4 5 2*   CLARA 8.5 8.5 8.6   * 108* 114*     Most Recent 2 LFT's:  Recent Labs   Lab Test 05/21/20  0542 05/20/20  0521   AST 10 10   ALT 14 13   ALKPHOS 58 58   BILITOTAL 0.4 0.4      Most Recent INR's and Anticoagulation Dosing History:  Anticoagulation Dose History     Recent Dosing and Labs Latest Ref Rng & Units 9/11/2014 1/31/2015 3/13/2020 5/14/2020    INR 0.86 - 1.14 1.13 1.06 1.12 1.12        Most Recent 3 Troponin's:  Recent Labs   Lab Test 05/17/20  1155 05/17/20  0450 05/14/20  1608   TROPI 0.055* 0.069* 0.057*     Most Recent Cholesterol Panel:No lab results found.  Most Recent 6 Bacteria Isolates From Any Culture (See EPIC Reports for Culture Details):  Recent Labs   Lab Test 05/17/20  0600 05/17/20  0455 05/17/20  0450   CULT Moderate growth  Candida tropicalis  Susceptibility testing not routinely done  *  Plus normal respiratory christian No growth after 6 days No growth after 6 days     Most Recent TSH, T4 and A1c Labs:  Recent Labs   Lab Test 05/22/20  0518   TSH 3.69     Results for orders placed or performed during the hospital encounter of 05/14/20   CT Head w/o Contrast    Narrative    PROCEDURE: CT HEAD W/O CONTRAST     HISTORY: Patient with complaints of neck syncope x2.  Rule out stroke.    COMPARISON: None.    TECHNIQUE:  Helical images of the head from the foramen magnum to the  vertex were obtained without contrast.    FINDINGS: There is an old right frontal infarct seen. There is white  matter low density consistent with small vessel disease in both  hemispheres. The ventricular system is normal in size. The brainstem  and cerebellum appear normal. The cranial vault is intact.  The  visualized paranasal sinuses are clear.      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 No acute abnormality.      FRANCHESKA PLEITEZ MD   CTA Head Neck with Contrast    Narrative    PROCEDURE: CTA  HEAD NECK WITH CONTRAST 5/14/2020 12:55 PM    HISTORY: Patient with complaints of neck syncope x2.  Rule out stroke    COMPARISONS: None.    Meds/Dose Given: Oszwkl951 50mL    TECHNIQUE: CT angiogram of the neck and CT angiogram of the brain both  with three-dimensional MIPS  reconstructions performed on a separate  workstation    FINDINGS: The brachiocephalic artery is widely patent. There is heavy  calcific plaquing at the origin of the right subclavian artery. There  is an occluded right vertebral artery. There is  atherosclerotic  plaquing at the origin of the right common carotid artery. At the  right carotid bifurcation there is heavy calcific plaquing with a 50%  stenosis noted. Beyond the bifurcation the right internal carotid  artery is widely patent to the skull base. There is mild calcific  plaquing at the carotid bifurcation on the left without stenosis. The  left internal carotid artery is widely patent to the skull base. The  left subclavian artery has some calcific plaquing. The left vertebral  artery is widely patent to the skull base.    CT angiogram of the brain reveals the distal left vertebral and  basilar arteries are widely patent. Both posterior cerebral arteries  are normal. There is heavy atherosclerotic plaquing in both carotid  siphons. Both supraclinoid internal carotid and middle cerebral  arteries are widely patent. The horizontal component of the left  anterior cerebral artery is developmentally small. The right  horizontal component of the anterior cerebral artery is compensatorily  enlarged.    CT scan of the brain without IV contrast reveals an old right frontal  infarct. The ventricular system is normal in size. The brainstem stem  and cerebellum appear normal.    The muscles of mastication and the parapharyngeal spaces are normal.  The salivary glands are normal. The larynx and upper trachea is  normal. Thyroid gland is normal. Severe degenerative changes are  present in the cervical spine         Impression    IMPRESSION: Occluded right vertebral artery. 50% stenosis of the  proximal right internal carotid artery at the carotid bifurcation    FRANCHESKA PLEITEZ MD   CTA Chest with Contrast    Narrative    PROCEDURE: CTA CHEST WITH CONTRAST 5/14/2020  11:39 AM    HISTORY: 74-year-old male with complaints of near syncope.  Has  elevated d-dimer.  Rule out PE    COMPARISONS: March 13, 2020    Meds/Dose Given: ISOVUE 370 75ml    TECHNIQUE: CT angiogram of the chest with sagittal coronal MIPS  reconstructions    FINDINGS: Interstitial thickening is seen in both lungs. The  interstitial opacities have worsened as compared to previous  examination in March 2020. There are confluent opacities seen in the  right middle lobe and both lower lobes which have also worsened from  previous examination. There is bronchial wall thickening predominantly  in the lower lobes bilaterally. There are mildly enlarged mediastinal  lymph nodes. The lymph nodes have enlarged as compared to previous  examination in March. A representative left paratracheal lymph node  has increased in maximal diameter from 18 to 26 mm. There are small  bilateral pleural effusions which represent a change from previous  examination. The heart is enlarged. Heavy coronary artery  calcifications are noted. Axillary and supraclavicular lymph nodes are  normal.    No pulmonary emboli are seen. Atherosclerotic plaquing is seen in the  thoracic aorta.    The upper portion of the liver spleen and pancreas appear normal.  There are benign cysts seen in the right kidney. Degenerative changes  are present in the thoracic spine.         Impression    IMPRESSION: Worsening interstitial and confluent alveolar opacities in  both lungs. There are small bilateral pleural effusions and enlarging  mediastinal lymph nodes. Possibility of a pneumonia should be  considered. There are no pulmonary emboli.    FRANCHESKA PLEITEZ MD   XR Chest Port 1 View    Narrative    PROCEDURE:  XR CHEST PORT 1 VW    HISTORY:  resp distress.     COMPARISON:  None.    FINDINGS:   The heart is enlarged. Postoperative changes are seen in the  mediastinum. Widespread interstitial opacities with areas of  confluence are noted. These widespread  opacities have worsened as  compared to previous examination of May 8, 2020 No pleural effusion or  pneumothorax.      Impression    IMPRESSION:  Significant worsening in bilateral predominantly  interstitial opacities from May 8, 2020      FRANCHESKA PLEITEZ MD   XR Chest Port 1 View    Narrative    PROCEDURE:  XR CHEST PORT 1 VW    HISTORY: dyspnea. .    COMPARISON:  5/17/2020    FINDINGS:    The cardiomediastinal contours are stable. There is calcific aortic  atherosclerosis.   Diffuse patchy alveolar opacities have improved when compared to the  immediate prior. Trace pleural fluid is unchanged. No pneumothorax is  seen.      Impression    IMPRESSION:  Slight interval improvement in diffuse bilateral alveolar  opacities.      EILEEN MARTINEZ MD   US Upper Extremity Venous Duplex Right    Narrative    US UPPER EXTREMITY VENOUS DUPLEX RIGHT  5/19/2020 3:24 PM    History:Male, age 74 years; patient concerns for right upper extremity  DVT.; r/o dvt    Comparison: None.    Technique: Grayscale and color Doppler ultrasound of the right  upper  extremity venous structures.    Findings:   The deep venous structures are patent and fully compressible. There is  normal augmentation of flow.      Impression    Impression:  No evidence of DVT.    COLBY REYES MD   XR Chest Port 1 View    Narrative    PROCEDURE: XR CHEST PORT 1 VW 5/20/2020 9:54 AM    HISTORY: dyspnea    COMPARISONS: 5/18/2020.    TECHNIQUE: Single view.    FINDINGS: There has been a median sternotomy and. Heart is stable in  size. There are bilateral interstitial infiltrates slightly more  confluent in the right mid lung and more prominent than on the prior  exam. No definite effusion is seen.         Impression    IMPRESSION: Interval worsening in the appearance of the chest.    FRANCI KENDALL MD   XR Chest Port 1 View    Narrative    PROCEDURE: XR CHEST PORT 1 VW 5/21/2020 6:53 AM    HISTORY: f/u pulmonary edema    COMPARISONS:  5/20/2020.    TECHNIQUE: Single view.    FINDINGS: There has been a median sternotomy. Heart is mildly enlarged  but is stable. There is diffuse bilateral interstitial prominence with  slightly more confluent density in the right mid lung and left lung  base. There is a small left-sided pleural effusion.         Impression    IMPRESSION: No significant change in the appearance of the chest.  Findings may be due to pulmonary edema or diffuse pneumonia.    FRANCI KENDALL MD

## 2020-05-28 NOTE — PLAN OF CARE
Occupational Therapy Discharge Summary    Reason for therapy discharge:    Discharged to transitional care facility.    Progress towards therapy goal(s). See goals on Care Plan in [facility] electronic health record for goal details.  Goals not met.  Barriers to achieving goals:   limited tolerance for therapy and discharge from facility.    Therapy recommendation(s):    Continued therapy is recommended.  Rationale/Recommendations:  pt has decreased activity tolerance and ADL function.

## 2020-05-28 NOTE — TELEPHONE ENCOUNTER
Received fax from [employer] in regards to Lamont Doran   :  [id number]     The following information was received via fax:    Resident returned from hospital. Ok for PT Evaluations and Plan of Treatment?    Ashley LeChevalier LPN

## 2020-05-29 NOTE — ED NOTES
Patient does not remember episode. Is hoping to be transported to [place]. Nurse from [facility] called and reported that since he is a full code, he will be sent to the ED every time he has an episode- per the DON. Patient called wife and updated her that he was back in the hospital.

## 2020-05-29 NOTE — ED AVS SNAPSHOT
[facility]  [address]  [place] [address]  Phone:  [phone]                                    Lamont Doran   MRN: [id number]    Department:  [facility]   Date of Visit:  5/29/2020           After Visit Summary Signature Page    I have received my discharge instructions, and my questions have been answered. I have discussed any challenges I see with this plan with the nurse or doctor.    ..........................................................................................................................................  Patient/Patient Representative Signature      ..........................................................................................................................................  Patient Representative Print Name and Relationship to Patient    ..................................................               ................................................  Date                                   Time    ..........................................................................................................................................  Reviewed by Signature/Title    ...................................................              ..............................................  Date                                               Time          22EPIC Rev 08/18

## 2020-05-29 NOTE — ED NOTES
Bed: ED04  Expected date:   Expected time:   Means of arrival:   Comments:  [facility]

## 2020-05-29 NOTE — ED NOTES
Spoke with [facility] and stated they were a little delayed and will be here in the next 10-15 min.

## 2020-05-29 NOTE — ED NOTES
Bed: ED04  Expected date:   Expected time:   Means of arrival:   Comments:  [facility]

## 2020-05-29 NOTE — ED PROVIDER NOTES
"  History     Chief Complaint   Patient presents with     Loss of Consciousness     HPI  Lamont Doran is a 74 year old male who presents to the ED after another episode of \"passing out\".  Patient was just discharged from the ED when he passed out when being transported back to the nursing home.  Exact description of these episodes of \"passing out\" is not available either from the nursing home or the transport company.  Patient is requesting to ED transfer to the [facility] at [place] since she has been seen multiple times both in this hospital and [facility] in [place] without a definite diagnosis.  3:10 PM further history obtained from the medical  who states that he was talking to the patient while driving back to the nursing home when he suddenly stopped talking and responding to his questions.  This continued for another 10 to 15 minutes until he got to the nursing home upon which she called the nurse to come and review patient in the van.  The vitals were stable and patient started talking again after which he was brought back to the emergency department.  Patient does not remember this incident and is requesting to be transferred to [facility] in [place].    Allergies:  Allergies   Allergen Reactions     No Clinical Screening - See Comments      Peas and liver       Problem List:    Patient Active Problem List    Diagnosis Date Noted     Acute on chronic respiratory failure with hypoxemia (H) 05/17/2020     Priority: Medium     Episode of syncope 05/14/2020     Priority: Medium     Peripheral vascular disease (H) 05/14/2020     Priority: Medium     Syncope 05/14/2020     Priority: Medium     Essential hypertension 05/08/2020     Priority: Medium     Preop general physical exam 05/08/2020     Priority: Medium     Panlobular emphysema (H) 05/08/2020     Priority: Medium     Obstructive chronic bronchitis with exacerbation (H) 05/08/2020     Priority: Medium     History of MI " (myocardial infarction) 2020     Priority: Medium     PVD (posterior vitreous detachment), bilateral 2020     Priority: Medium        Past Medical History:    No past medical history on file.    Past Surgical History:    No past surgical history on file.    Family History:    No family history on file.    Social History:  Marital Status:   [2]  Social History     Tobacco Use     Smoking status: Former Smoker     Types: Cigarettes     Last attempt to quit: 1990     Years since quittin.0     Smokeless tobacco: Never Used   Substance Use Topics     Alcohol use: Not Currently     Drug use: Never        Medications:    albuterol (PROAIR HFA/PROVENTIL HFA/VENTOLIN HFA) 108 (90 Base) MCG/ACT inhaler  aspirin 81 MG EC tablet  atorvastatin (LIPITOR) 80 MG tablet  budesonide-formoterol (SYMBICORT) 160-4.5 MCG/ACT Inhaler  clopidogrel (PLAVIX) 75 MG tablet  fludrocortisone (FLORINEF) 0.1 MG tablet  furosemide (LASIX) 20 MG tablet  LANsoprazole (PREVACID) 15 MG DR capsule  loperamide (IMODIUM) 2 MG capsule  midodrine (PROAMATINE) 2.5 MG tablet  potassium chloride ER (K-TAB) 20 MEQ CR tablet  QUEtiapine (SEROQUEL) 25 MG tablet  tiotropium (SPIRIVA RESPIMAT) 2.5 MCG/ACT inhaler          Review of Systems   Constitutional: Negative for fever.   Respiratory: Negative for shortness of breath.    Cardiovascular: Negative for chest pain.   Gastrointestinal: Negative for abdominal pain.   All other systems reviewed and are negative.      Physical Exam   BP: 129/77  Pulse: 63  Temp: 98.1  F (36.7  C)  Resp: 16  SpO2: 96 %      Physical Exam  Vitals signs and nursing note reviewed.   Constitutional:       General: He is not in acute distress.     Appearance: He is well-developed. He is not diaphoretic.   HENT:      Head: Normocephalic and atraumatic.   Eyes:      Pupils: Pupils are equal, round, and reactive to light.   Cardiovascular:      Rate and Rhythm: Normal rate and regular rhythm.      Heart sounds:  Normal heart sounds.   Pulmonary:      Effort: Pulmonary effort is normal. No respiratory distress.      Breath sounds: Normal breath sounds. No stridor.   Abdominal:      General: Bowel sounds are normal. There is no distension.      Tenderness: There is no abdominal tenderness.   Musculoskeletal:         General: No tenderness or deformity.   Neurological:      Mental Status: He is alert and oriented to person, place, and time.      Cranial Nerves: No cranial nerve deficit.         ED Course   Patient evaluated and EKG ordered but he declined.    Procedures      Results for orders placed or performed during the hospital encounter of 05/29/20 (from the past 24 hour(s))   CBC with platelets differential   Result Value Ref Range    WBC 7.7 4.0 - 11.0 10e9/L    RBC Count 4.11 (L) 4.4 - 5.9 10e12/L    Hemoglobin 10.5 (L) 13.3 - 17.7 g/dL    Hematocrit 32.9 (L) 40.0 - 53.0 %    MCV 80 78 - 100 fl    MCH 25.5 (L) 26.5 - 33.0 pg    MCHC 31.9 31.5 - 36.5 g/dL    RDW 17.3 (H) 10.0 - 15.0 %    Platelet Count 173 150 - 450 10e9/L    Diff Method Automated Method     % Neutrophils 76.6 %    % Lymphocytes 12.3 %    % Monocytes 9.6 %    % Eosinophils 0.9 %    % Basophils 0.3 %    % Immature Granulocytes 0.3 %    Nucleated RBCs 0 0 /100    Absolute Neutrophil 5.9 1.6 - 8.3 10e9/L    Absolute Lymphocytes 0.9 0.8 - 5.3 10e9/L    Absolute Monocytes 0.7 0.0 - 1.3 10e9/L    Absolute Eosinophils 0.1 0.0 - 0.7 10e9/L    Absolute Basophils 0.0 0.0 - 0.2 10e9/L    Abs Immature Granulocytes 0.0 0 - 0.4 10e9/L    Absolute Nucleated RBC 0.0    Lactic acid whole blood   Result Value Ref Range    Lactic Acid 0.8 0.7 - 2.0 mmol/L   Troponin I   Result Value Ref Range    Troponin I ES 0.054 (H) 0.000 - 0.045 ug/L   Comprehensive metabolic panel   Result Value Ref Range    Sodium 139 133 - 144 mmol/L    Potassium 3.9 3.4 - 5.3 mmol/L    Chloride 107 94 - 109 mmol/L    Carbon Dioxide 28 20 - 32 mmol/L    Anion Gap 4 3 - 14 mmol/L    Glucose 92 70 -  99 mg/dL    Urea Nitrogen 32 (H) 7 - 30 mg/dL    Creatinine 1.02 0.66 - 1.25 mg/dL    GFR Estimate 71 >60 mL/min/[1.73_m2]    GFR Estimate If Black 82 >60 mL/min/[1.73_m2]    Calcium 8.3 (L) 8.5 - 10.1 mg/dL    Bilirubin Total 0.4 0.2 - 1.3 mg/dL    Albumin 1.7 (L) 3.4 - 5.0 g/dL    Protein Total 5.5 (L) 6.8 - 8.8 g/dL    Alkaline Phosphatase 73 40 - 150 U/L    ALT 13 0 - 70 U/L    AST 10 0 - 45 U/L   TSH   Result Value Ref Range    TSH 3.81 0.40 - 4.00 mU/L   UA reflex to Microscopic and Culture    Specimen: Catheterized Urine   Result Value Ref Range    Color Urine Light Yellow     Appearance Urine Clear     Glucose Urine Negative NEG^Negative mg/dL    Bilirubin Urine Negative NEG^Negative    Ketones Urine Negative NEG^Negative mg/dL    Specific Gravity Urine 1.018 1.003 - 1.035    Blood Urine Small (A) NEG^Negative    pH Urine 6.5 4.7 - 8.0 pH    Protein Albumin Urine 300 (A) NEG^Negative mg/dL    Urobilinogen mg/dL Normal 0.0 - 2.0 mg/dL    Nitrite Urine Negative NEG^Negative    Leukocyte Esterase Urine Trace (A) NEG^Negative    Source Catheterized Urine     RBC Urine 2 0 - 2 /HPF    WBC Urine 7 (H) 0 - 5 /HPF    Bacteria Urine Few (A) NEG^Negative /HPF    Mucous Urine Present (A) NEG^Negative /LPF    Hyaline Casts 10 (A) OTO2^O - 2 /LPF   CT Head w/o Contrast    Narrative    PROCEDURE: CT HEAD W/O CONTRAST   5/29/2020 10:24 AM    HISTORY:Male, age,  74 years, , , Altered level of consciousness  (LOC), unexplained    COMPARISON: CT scan of the brain 5/14/2020    TECHNIQUE: CT of the brain without contrast.    FINDINGS: Ventricles and sulci are prominent in size and shape. Gray  and white matter demonstrate scattered areas of decreased density. Old  cortical infarcts again seen in the right frontal region..    There is no evidence of mass, mass effect or midline shift. No  evidence of acute hemorrhage.    The bones are unremarkable. No fracture.       Impression    IMPRESSION:   No acute intracranial abnormality.  " No acute fracture.     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    COLBY REYES MD   Troponin I   Result Value Ref Range    Troponin I ES 0.054 (H) 0.000 - 0.045 ug/L       Medications - No data to display    Assessments & Plan (with Medical Decision Making)   Recurrent syncope:  Proteinuria:  Patient returned to the ED within an hour of being discharged because he passed out in the transport van.  Patient was talking to the  of the van who was transporting him back to the nursing home when he suddenly went silent.  He started talking again when they got to the nursing home but he could not remember the incident where he stopped talking.  His vitals were stable upon arrival to the ED, EKG was ordered but he declined it.  He did not complain of pains anywhere in the body.  These episodes of \"passing out\" could be true orthostatic hypotension or seizure disorder that has been diagnosed.  Patient requested to be transferred to [facility] and has been accepted for transfer there to the emergency department at [place].  He will be transferred via ambulance.  He was also noted to have significant proteinuria on urinalysis done today.  Unclear whether this has been chronic or new.  Will need further evaluation including 24-hour urine protein to delineate whether this is related to undiagnosed malignancy on renal disease.  Patient transferred to [facility] via ambulance.    I have reviewed the nursing notes.    I have reviewed the findings, diagnosis, plan and need for follow up with the patient.    New Prescriptions    No medications on file       Final diagnoses:   Recurrent syncope   Proteinuria, unspecified type       5/29/2020   [facility]     Silvino Moody MD  05/29/20 1806       Silvino Moody MD  05/29/20 1848    "

## 2020-05-29 NOTE — TELEPHONE ENCOUNTER
Approval faxed back to facility per Dr. Delnaey. Ashley A. Lechevalier, LPN on 5/29/2020 at 7:51 AM

## 2020-05-29 NOTE — ED PROVIDER NOTES
History     Chief Complaint   Patient presents with     Loss of Consciousness     HPI  Lamont Doran is a 74 year old male who presented to the emergency department after a syncopal episode.  Patient passed out while seated on a chair waiting for breakfast.  He was not responding to questions and this lasted for several minutes.  He did not have any abnormal movements of the extremities, loss of sphincter control, fever, chills, shortness of breath.  Patient was discharged from the hospital yesterday after being admitted for orthostatic hypotension.  He also has acute chronic respiratory failure recurrent syncopal episodes, peripheral vascular disease, COPD.  At the time of history and physical, patient was feeling fine without any chest pain, headache, unilateral body weakness, palpitations, shortness of breath, abdominal pain, vomiting, diarrhea or any other concerns.  His blood pressure was noted to be 96/61 mmHg upon arrival to the ED.    Allergies:  Allergies   Allergen Reactions     No Clinical Screening - See Comments      Peas and liver       Problem List:    Patient Active Problem List    Diagnosis Date Noted     Acute on chronic respiratory failure with hypoxemia (H) 05/17/2020     Priority: Medium     Episode of syncope 05/14/2020     Priority: Medium     Peripheral vascular disease (H) 05/14/2020     Priority: Medium     Syncope 05/14/2020     Priority: Medium     Essential hypertension 05/08/2020     Priority: Medium     Preop general physical exam 05/08/2020     Priority: Medium     Panlobular emphysema (H) 05/08/2020     Priority: Medium     Obstructive chronic bronchitis with exacerbation (H) 05/08/2020     Priority: Medium     History of MI (myocardial infarction) 05/08/2020     Priority: Medium     PVD (posterior vitreous detachment), bilateral 05/08/2020     Priority: Medium        Past Medical History:    No past medical history on file.    Past Surgical History:    No past surgical history on  file.    Family History:    No family history on file.    Social History:  Marital Status:   [2]  Social History     Tobacco Use     Smoking status: Former Smoker     Types: Cigarettes     Last attempt to quit: 1990     Years since quittin.0     Smokeless tobacco: Never Used   Substance Use Topics     Alcohol use: Not Currently     Drug use: Never        Medications:    albuterol (PROAIR HFA/PROVENTIL HFA/VENTOLIN HFA) 108 (90 Base) MCG/ACT inhaler  aspirin 81 MG EC tablet  atorvastatin (LIPITOR) 80 MG tablet  budesonide-formoterol (SYMBICORT) 160-4.5 MCG/ACT Inhaler  clopidogrel (PLAVIX) 75 MG tablet  fludrocortisone (FLORINEF) 0.1 MG tablet  furosemide (LASIX) 20 MG tablet  LANsoprazole (PREVACID) 15 MG DR capsule  loperamide (IMODIUM) 2 MG capsule  midodrine (PROAMATINE) 2.5 MG tablet  potassium chloride ER (K-TAB) 20 MEQ CR tablet  QUEtiapine (SEROQUEL) 25 MG tablet  tiotropium (SPIRIVA RESPIMAT) 2.5 MCG/ACT inhaler          Review of Systems   Constitutional: Negative for fever.   Respiratory: Negative for shortness of breath.    Cardiovascular: Negative for chest pain.   Gastrointestinal: Negative for abdominal pain.   All other systems reviewed and are negative.      Physical Exam   BP: 96/61  Pulse: 66  Temp: 97.2  F (36.2  C)  Resp: 18  SpO2: 95 %      Physical Exam  Vitals signs and nursing note reviewed.   Constitutional:       General: He is not in acute distress.     Appearance: He is well-developed. He is not diaphoretic.   HENT:      Head: Normocephalic and atraumatic.   Eyes:      Pupils: Pupils are equal, round, and reactive to light.   Cardiovascular:      Rate and Rhythm: Normal rate and regular rhythm.      Heart sounds: Normal heart sounds.   Pulmonary:      Effort: Pulmonary effort is normal. No respiratory distress.      Breath sounds: Normal breath sounds. No stridor.   Abdominal:      General: Bowel sounds are normal. There is no distension.      Tenderness: There is no  abdominal tenderness.   Musculoskeletal:         General: No tenderness or deformity.   Neurological:      Mental Status: He is alert and oriented to person, place, and time.      Cranial Nerves: No cranial nerve deficit.         ED Course   Patient evaluated and laboratory tests ordered.  Started on IV fluid hydration.    Procedures       EKG Interpretation:      Interpreted by Silvino Moody MD  Time reviewed: 10 AM  Symptoms at time of EKG: Syncopal episode  Rhythm: normal sinus   Rate: normal  Axis: normal  Ectopy: none  Conduction: Right bundle branch block  ST Segments/ T Waves: Inferior ST-T wave changes  Q Waves: none  Comparison to prior: No old EKG available    Clinical Impression: Normal sinus rhythm with premature atrial complexes, right bundle branch block, T wave inversion in inferior leads.      Results for orders placed or performed during the hospital encounter of 05/29/20 (from the past 24 hour(s))   CBC with platelets differential   Result Value Ref Range    WBC 7.7 4.0 - 11.0 10e9/L    RBC Count 4.11 (L) 4.4 - 5.9 10e12/L    Hemoglobin 10.5 (L) 13.3 - 17.7 g/dL    Hematocrit 32.9 (L) 40.0 - 53.0 %    MCV 80 78 - 100 fl    MCH 25.5 (L) 26.5 - 33.0 pg    MCHC 31.9 31.5 - 36.5 g/dL    RDW 17.3 (H) 10.0 - 15.0 %    Platelet Count 173 150 - 450 10e9/L    Diff Method Automated Method     % Neutrophils 76.6 %    % Lymphocytes 12.3 %    % Monocytes 9.6 %    % Eosinophils 0.9 %    % Basophils 0.3 %    % Immature Granulocytes 0.3 %    Nucleated RBCs 0 0 /100    Absolute Neutrophil 5.9 1.6 - 8.3 10e9/L    Absolute Lymphocytes 0.9 0.8 - 5.3 10e9/L    Absolute Monocytes 0.7 0.0 - 1.3 10e9/L    Absolute Eosinophils 0.1 0.0 - 0.7 10e9/L    Absolute Basophils 0.0 0.0 - 0.2 10e9/L    Abs Immature Granulocytes 0.0 0 - 0.4 10e9/L    Absolute Nucleated RBC 0.0    Lactic acid whole blood   Result Value Ref Range    Lactic Acid 0.8 0.7 - 2.0 mmol/L   Troponin I   Result Value Ref Range    Troponin I ES 0.054 (H)  0.000 - 0.045 ug/L   Comprehensive metabolic panel   Result Value Ref Range    Sodium 139 133 - 144 mmol/L    Potassium 3.9 3.4 - 5.3 mmol/L    Chloride 107 94 - 109 mmol/L    Carbon Dioxide 28 20 - 32 mmol/L    Anion Gap 4 3 - 14 mmol/L    Glucose 92 70 - 99 mg/dL    Urea Nitrogen 32 (H) 7 - 30 mg/dL    Creatinine 1.02 0.66 - 1.25 mg/dL    GFR Estimate 71 >60 mL/min/[1.73_m2]    GFR Estimate If Black 82 >60 mL/min/[1.73_m2]    Calcium 8.3 (L) 8.5 - 10.1 mg/dL    Bilirubin Total 0.4 0.2 - 1.3 mg/dL    Albumin 1.7 (L) 3.4 - 5.0 g/dL    Protein Total 5.5 (L) 6.8 - 8.8 g/dL    Alkaline Phosphatase 73 40 - 150 U/L    ALT 13 0 - 70 U/L    AST 10 0 - 45 U/L   TSH   Result Value Ref Range    TSH 3.81 0.40 - 4.00 mU/L   UA reflex to Microscopic and Culture    Specimen: Catheterized Urine   Result Value Ref Range    Color Urine Light Yellow     Appearance Urine Clear     Glucose Urine Negative NEG^Negative mg/dL    Bilirubin Urine Negative NEG^Negative    Ketones Urine Negative NEG^Negative mg/dL    Specific Gravity Urine 1.018 1.003 - 1.035    Blood Urine Small (A) NEG^Negative    pH Urine 6.5 4.7 - 8.0 pH    Protein Albumin Urine 300 (A) NEG^Negative mg/dL    Urobilinogen mg/dL Normal 0.0 - 2.0 mg/dL    Nitrite Urine Negative NEG^Negative    Leukocyte Esterase Urine Trace (A) NEG^Negative    Source Catheterized Urine     RBC Urine 2 0 - 2 /HPF    WBC Urine 7 (H) 0 - 5 /HPF    Bacteria Urine Few (A) NEG^Negative /HPF    Mucous Urine Present (A) NEG^Negative /LPF    Hyaline Casts 10 (A) OTO2^O - 2 /LPF   CT Head w/o Contrast    Narrative    PROCEDURE: CT HEAD W/O CONTRAST   5/29/2020 10:24 AM    HISTORY:Male, age,  74 years, , , Altered level of consciousness  (LOC), unexplained    COMPARISON: CT scan of the brain 5/14/2020    TECHNIQUE: CT of the brain without contrast.    FINDINGS: Ventricles and sulci are prominent in size and shape. Gray  and white matter demonstrate scattered areas of decreased density. Old  cortical  infarcts again seen in the right frontal region..    There is no evidence of mass, mass effect or midline shift. No  evidence of acute hemorrhage.    The bones are unremarkable. No fracture.       Impression    IMPRESSION:   No acute intracranial abnormality.  No acute fracture.     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    COLBY REYES MD   Troponin I   Result Value Ref Range    Troponin I ES 0.054 (H) 0.000 - 0.045 ug/L       Medications   0.9% sodium chloride BOLUS (0 mLs Intravenous Stopped 5/29/20 1057)   aspirin (ASA) chewable tablet 324 mg (324 mg Oral Given 5/29/20 1046)   heparin ANTICOAGULANT Loading dose from infusion pump *Give when STARTING heparin infusion 5,400 Units (5,400 Units Intravenous Given 5/29/20 1053)       Assessments & Plan (with Medical Decision Making)   Difficult syncope: Presented to the ED from nursing home after passing out for unknown duration.  Patient was back to his baseline by the time he got to the ED.  His troponin was elevated to 0.054 and a peak troponin of 12.4 hours was still the same 0.054.  Patient did not have any chest pain, palpitations or shortness of breath.  EKG showed 2 of the lateral inferior leads which is not new.  Normal urinalysis, TSH, CBC and CMP.  Dr. Cortez of [facility] was discussed on the phone and suggested that patient be referred to [facility] since she has been investigated extensively at the hospital.  Also talked to the hospitalist on call here Dr. Ledesma who also stated that patient was only discharged from here yesterday after being admitted to the hospital for more than a week and there is nothing further they can do for him.  I discussed my findings with the patient and discharged him home pending review at [facility] next week.    I have reviewed the nursing notes.    I have reviewed the findings, diagnosis, plan and need for follow up with the  patient.    Discharge Medication List as of 5/29/2020 12:59 PM          Final diagnoses:   Atypical syncope       5/29/2020   [facility]     Silvino Moody MD  05/29/20 6113

## 2020-05-29 NOTE — ED NOTES
Discharge instructions reviewed with patient, and patient verbalized understanding. Pt awaiting [facility] for transport back to NH.

## 2020-05-29 NOTE — ED TRIAGE NOTES
Patient was unresponsive at the NH for 25 minutes prior to arrival of EMS. Alert upon arrival of EMS then passed out- did not respond to painful stimuli. Woke up when moved to cot

## 2020-06-18 NOTE — TELEPHONE ENCOUNTER
Approval to discontinue faxed back to facility per Dr. Dempsey. Ashley A. Lechevalier, LPN on 6/18/2020 at 2:44 PM

## 2020-06-18 NOTE — TELEPHONE ENCOUNTER
Notes faxed back to facility per Dr. Dempsey. Ashley A. Lechevalier, LPN on 6/18/2020 at 3:54 PM

## 2020-06-18 NOTE — TELEPHONE ENCOUNTER
Received fax from [employer] in regards to Lamont Doran   :  1945     The following information was received via fax:    Resident re-admitting today from [facility]. Need following clarifications.    Enrique jim has on file that resident was receiving lasix 40 mg every day, seroquel 25 mg gtts, flomax 0.4mg every day, finasteride (unsure of doseing) and metoprolol ER 25mg every day. Current hospital paperwork does not address these medications.    Please advise how you would like to proceed.     Ashley LeChevalier LPN

## 2020-06-18 NOTE — TELEPHONE ENCOUNTER
They note to dicontinue flomax due to orthostasis.     Please continue finasteride 5mg daily     Hold seroquel 25mg for now as this exacerbates orthostasis. May continue 25mg tabs prn for agitation.     Hold lasix 40mg daily due to orthostais, monitor for edema. Compression hose should be worn.     Hold metoprolol, montior BP and HR daily.     Opal Dempsey MD

## 2020-06-18 NOTE — TELEPHONE ENCOUNTER
Received fax from [employer] in regards to aLmont Doran   :  1945     The following information was received via fax:    Resident came back from hospital with Immodium PRN orders. Ok to discontinue and use our standing order?    Ashley LeChevalier LPN

## 2020-06-19 NOTE — TELEPHONE ENCOUNTER
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  1) received fax to today to hold seroquel, lasix and metoprolol. Were Discontinued at [facility] ok to discontinue them?  2) you reordered finasteride 5 mg every day restarted, need Dx?  3) can we have order for OT/PT to evaluate and treat?  4) Wants to try to have trinh cath removed. Would like an apt with Dr. Philip. Can he have a referral?  5) Please review P.O and sign both pagees  6) Please sign POLST.    Fax will be printed for review and signature.     Ashley LeChevalier LPN

## 2020-06-22 NOTE — TELEPHONE ENCOUNTER
Received fax from [employer] in regards to Lamontroxana Doran   :  1945     The following information was received via fax:    Ok to discontinue Dx: J18.9 Pneumonia, no longer a valid Dx for resident.?    Ashley LeChevalier LPN

## 2020-06-23 NOTE — TELEPHONE ENCOUNTER
Received fax from [employer] in regards to Lamont Doran   :  1945     The following information was received via fax:    Ok for O2 1-4 LPM PRN to keep sats 90% or greater?    Ok for resident to self administer symbicort after nurse set up?    Ashley LeChevalier LPN

## 2020-06-23 NOTE — TELEPHONE ENCOUNTER
Approval faxed back to facility per Dr. Dempsey. Ashley A. Lechevalier, LPN on 6/23/2020 at 8:31 AM

## 2020-06-24 NOTE — TELEPHONE ENCOUNTER
Received fax from [employer] in regards to Lamont Doran   :  1945     The following information was received via fax:    Resident returned from Hospital with catheter. Ok for 16 F 10 ml balloon [place] Catheter q monthly and PRN?    Ok for Dx obstructive uropathy?    Ashley LeChevalier LPN

## 2020-06-25 NOTE — TELEPHONE ENCOUNTER
"Received fax regarding the following:    \" ok to discontinue inactive DX of pneumonia (R13.2) and diarrhea, unspecified (R19.7)?  "

## 2020-06-30 NOTE — TELEPHONE ENCOUNTER
Received fax regarding the following:    Res with a noted weight loss of 18 lbs since April. May we have an order to administer supplement (boost/ ensure) BID?

## 2020-07-01 NOTE — LETTER
7/1/2020       RE: Lamont Doran  [address]  [place] [address]     Dear Colleague,    Thank you for referring your patient, Lamont Doran, to the [facility] at [facility]. Please see a copy of my visit note below.      History     Chief Complaint:    Follow Up (Voiding Trial)      HPI   Lamont Doran is a 75 year old male who presents with urinary retention.  Jaspal is able to provide me with of fairly thorough history.  It sounds like sometime in March he ended up going to [facility] and I am not sure exactly what all was going on he was having difficulty with syncope and passing out but from a urology perspective he had a catheter put in during this March episode in the hospital then he was discharged to a rehab facility in April and it sounds like they gave him some trial voids and at one point he was able to urinate for 2 days but then they ended up putting his catheter back in.  He was started on finasteride.  He then went back in the end of May to [facility] sounds like he had an iliac bypass and because he was not urinating very well he told the surgeon to put a catheter in him.  He then was at [facility] being evaluated for syncope and they apparently changed his catheter June 4 but he has not had any urologic evaluation.  He had a TURP in 2002 at [facility].  He did state he was urinating fine before he started getting sick in March.  They started him on finasteride I suspect they did not want a put him on tamsulosin because he was having so much trouble with syncope and dizziness.  He is not ambulating and currently is at the nursing home.    Allergies:    Allergies   Allergen Reactions     No Clinical Screening - See Comments      Peas and liver        Medications:      aspirin 81 MG EC tablet  atorvastatin (LIPITOR) 80 MG tablet  budesonide-formoterol (SYMBICORT) 160-4.5 MCG/ACT Inhaler  droxidopa (NORTHERA) 300 MG capsule  finasteride  (PROSCAR) 5 MG tablet  fludrocortisone (FLORINEF) 0.1 MG tablet  LANsoprazole (PREVACID) 15 MG DR capsule  midodrine (PROAMATINE) 2.5 MG tablet  potassium chloride ER (K-TAB) 20 MEQ CR tablet  rivaroxaban ANTICOAGULANT (XARELTO) 20 MG TABS tablet  tiotropium (SPIRIVA RESPIMAT) 2.5 MCG/ACT inhaler        Problem List:      Patient Active Problem List    Diagnosis Date Noted     Acute on chronic respiratory failure with hypoxemia (H) 2020     Priority: Medium     Episode of syncope 2020     Priority: Medium     Peripheral vascular disease (H) 2020     Priority: Medium     Syncope 2020     Priority: Medium     Essential hypertension 2020     Priority: Medium     Preop general physical exam 2020     Priority: Medium     Panlobular emphysema (H) 2020     Priority: Medium     Obstructive chronic bronchitis with exacerbation (H) 2020     Priority: Medium     History of MI (myocardial infarction) 2020     Priority: Medium     PVD (posterior vitreous detachment), bilateral 2020     Priority: Medium        Past Medical History:      No past medical history on file.    Past Surgical History:      No past surgical history on file.    Family History:      No family history on file.    Social History:    Marital Status:   [2]  Social History     Tobacco Use     Smoking status: Former Smoker     Types: Cigarettes     Last attempt to quit: 1990     Years since quittin.1     Smokeless tobacco: Never Used   Substance Use Topics     Alcohol use: Not Currently     Drug use: Never        Review of Systems   Respiratory: Positive for shortness of breath.    All other systems reviewed and are negative.        Physical Exam   Vitals:  BP (!) 144/86 (BP Location: Left arm, Patient Position: Sitting, Cuff Size: Adult Regular)   Pulse 89   Ht 1.829 m (6')   Wt 89.8 kg (198 lb)   SpO2 93%   BMI 26.85 kg/m        Physical Exam  Constitutional:       Appearance:  Normal appearance.   Pulmonary:      Effort: Pulmonary effort is normal.   Abdominal:      General: Abdomen is flat. Bowel sounds are normal.      Palpations: Abdomen is soft. There is no mass.   Genitourinary:         Comments: Patient has an indwelling Clifton catheter.  There is no evidence of urethral erosion the penis is circumcised and the shaft the penis otherwise is normal testicles are both descended they are a little difficult to palpate because he is examined in the chair but I do not see any epididymal orchitis or concerns with the testicles.  I was unable do a rectal exam because we cannot get this patient to stand and we were unable to transfer him without a Yohan.  Neurological:      Mental Status: He is alert.         Impression: Urinary retention  Plan   Plan: The patient comes today without any kind of a Oyhan pad it is late in the afternoon and typically we do not like to do voiding trials at this time of the day because if the patient is unable to void and that cannot get a catheter in I will not be available to assist in the emergency room.  We did make an attempt to try and transfer him but the wheelchair does not have removable arms and even with the assistance of 3 we cannot get him to stand.  My plan is to have the nursing home remove the catheter early Monday morning July 6 and if he is unable to void they can replace the catheter.  If they have any difficulty doing that then at least I am available and he would be sent to the emergency room and we can assist him.  If he is unable to void then I have instructed the nursing home to contact us and he will need to be scheduled for some further evaluation.  He will need to be transferred with a Yohan pad or we would need to have a Yohan lift available in our department to assist with any evaluation moving forward.      No follow-ups on file.    Jacqui Philip MD  [facility] [place]            Again, thank you for allowing me to  participate in the care of your patient.      Sincerely,    Jacqui Philip MD

## 2020-07-01 NOTE — NURSING NOTE
Chief Complaint   Patient presents with     Follow Up     Voiding Trial       Initial BP (!) 144/86 (BP Location: Left arm, Patient Position: Sitting, Cuff Size: Adult Regular)   Pulse 89   Ht 1.829 m (6')   Wt 89.8 kg (198 lb)   SpO2 93%   BMI 26.85 kg/m   Estimated body mass index is 26.85 kg/m  as calculated from the following:    Height as of this encounter: 1.829 m (6').    Weight as of this encounter: 89.8 kg (198 lb).  Medication Reconciliation: complete    Sailaja Monsalve LPN

## 2020-07-01 NOTE — NURSING NOTE
"Chief Complaint   Patient presents with     senior care Regulatory       Initial /68   Pulse 52   Temp 98.2  F (36.8  C)   Resp 20   Wt 101.2 kg (223 lb)   SpO2 93%   BMI 31.10 kg/m   Estimated body mass index is 31.1 kg/m  as calculated from the following:    Height as of 3/31/14: 1.803 m (5' 11\").    Weight as of this encounter: 101.2 kg (223 lb).  Medication Reconciliation: complete  Ashley A. Lechevalier, LPN  "

## 2020-07-01 NOTE — PROGRESS NOTES
History     Chief Complaint:    Follow Up (Voiding Trial)      HPI   Lamont Doran is a 75 year old male who presents with urinary retention.  Jaspal is able to provide me with of fairly thorough history.  It sounds like sometime in March he ended up going to [facility] and I am not sure exactly what all was going on he was having difficulty with syncope and passing out but from a urology perspective he had a catheter put in during this March episode in the hospital then he was discharged to a rehab facility in April and it sounds like they gave him some trial voids and at one point he was able to urinate for 2 days but then they ended up putting his catheter back in.  He was started on finasteride.  He then went back in the end of May to [facility] sounds like he had an iliac bypass and because he was not urinating very well he told the surgeon to put a catheter in him.  He then was at [facility] being evaluated for syncope and they apparently changed his catheter June 4 but he has not had any urologic evaluation.  He had a TURP in 2002 at [facility].  He did state he was urinating fine before he started getting sick in March.  They started him on finasteride I suspect they did not want a put him on tamsulosin because he was having so much trouble with syncope and dizziness.  He is not ambulating and currently is at the nursing home.    Allergies:    Allergies   Allergen Reactions     No Clinical Screening - See Comments      Peas and liver        Medications:      aspirin 81 MG EC tablet  atorvastatin (LIPITOR) 80 MG tablet  budesonide-formoterol (SYMBICORT) 160-4.5 MCG/ACT Inhaler  droxidopa (NORTHERA) 300 MG capsule  finasteride (PROSCAR) 5 MG tablet  fludrocortisone (FLORINEF) 0.1 MG tablet  LANsoprazole (PREVACID) 15 MG DR capsule  midodrine (PROAMATINE) 2.5 MG tablet  potassium chloride ER (K-TAB) 20 MEQ CR tablet  rivaroxaban ANTICOAGULANT (XARELTO) 20 MG TABS tablet  tiotropium (SPIRIVA RESPIMAT) 2.5 MCG/ACT  inhaler        Problem List:      Patient Active Problem List    Diagnosis Date Noted     Acute on chronic respiratory failure with hypoxemia (H) 2020     Priority: Medium     Episode of syncope 2020     Priority: Medium     Peripheral vascular disease (H) 2020     Priority: Medium     Syncope 2020     Priority: Medium     Essential hypertension 2020     Priority: Medium     Preop general physical exam 2020     Priority: Medium     Panlobular emphysema (H) 2020     Priority: Medium     Obstructive chronic bronchitis with exacerbation (H) 2020     Priority: Medium     History of MI (myocardial infarction) 2020     Priority: Medium     PVD (posterior vitreous detachment), bilateral 2020     Priority: Medium        Past Medical History:      No past medical history on file.    Past Surgical History:      No past surgical history on file.    Family History:      No family history on file.    Social History:    Marital Status:   [2]  Social History     Tobacco Use     Smoking status: Former Smoker     Types: Cigarettes     Last attempt to quit: 1990     Years since quittin.1     Smokeless tobacco: Never Used   Substance Use Topics     Alcohol use: Not Currently     Drug use: Never        Review of Systems   Respiratory: Positive for shortness of breath.    All other systems reviewed and are negative.        Physical Exam   Vitals:  BP (!) 144/86 (BP Location: Left arm, Patient Position: Sitting, Cuff Size: Adult Regular)   Pulse 89   Ht 1.829 m (6')   Wt 89.8 kg (198 lb)   SpO2 93%   BMI 26.85 kg/m        Physical Exam  Constitutional:       Appearance: Normal appearance.   Pulmonary:      Effort: Pulmonary effort is normal.   Abdominal:      General: Abdomen is flat. Bowel sounds are normal.      Palpations: Abdomen is soft. There is no mass.   Genitourinary:         Comments: Patient has an indwelling Clifton catheter.  There is no evidence  of urethral erosion the penis is circumcised and the shaft the penis otherwise is normal testicles are both descended they are a little difficult to palpate because he is examined in the chair but I do not see any epididymal orchitis or concerns with the testicles.  I was unable do a rectal exam because we cannot get this patient to stand and we were unable to transfer him without a Yohan.  Neurological:      Mental Status: He is alert.         Impression: Urinary retention  Plan   Plan: The patient comes today without any kind of a Yohan pad it is late in the afternoon and typically we do not like to do voiding trials at this time of the day because if the patient is unable to void and that cannot get a catheter in I will not be available to assist in the emergency room.  We did make an attempt to try and transfer him but the wheelchair does not have removable arms and even with the assistance of 3 we cannot get him to stand.  My plan is to have the nursing home remove the catheter early Monday morning July 6 and if he is unable to void they can replace the catheter.  If they have any difficulty doing that then at least I am available and he would be sent to the emergency room and we can assist him.  If he is unable to void then I have instructed the nursing home to contact us and he will need to be scheduled for some further evaluation.  He will need to be transferred with a Yohan pad or we would need to have a Yohan lift available in our department to assist with any evaluation moving forward.      No follow-ups on file.    Jacqui Philip MD  [facility]

## 2020-07-06 NOTE — TELEPHONE ENCOUNTER
Received fax regarding the following:    Res is starting to develop a urethral tear d/t indwelling cath use. He has an order to remove catheter on 7/6 and attempt a void trial. No drainage noted from site. Minor bleeding present any concerns/ recommendations?

## 2020-07-07 NOTE — TELEPHONE ENCOUNTER
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 He most recently passed out while transferring to restroom this AM. BP was recorded at 60/20- he recovered after 2 minutes. Stated he felt dizzy and nauseous. Currently takes droxidopa 600 mg po tid. Any concerns/ new orders?

## 2020-07-09 NOTE — TELEPHONE ENCOUNTER
TC to NH regarding fax for hematology referral. Skye charge nurse called back and stated pt was set up at the [facility] to see hematology but does not want to make family drive that far. A referral order was asked for so pt can be seen here in [place]. We will await order. Pt was recently discharged from the [facility].  Natty Pierce, RN  Oncology Care Coordinator

## 2020-07-10 NOTE — TELEPHONE ENCOUNTER
"Received fax from [facility]  in regards to Lamont Doran   :  1945     The following information was received via fax:    Staff reports Lamont is having episodes of loose stools almost daily- BM is \"gray\" or \"cynthia colored\". Any concern/new orders?     Bridgett Jane LPN            "

## 2020-07-14 NOTE — TELEPHONE ENCOUNTER
Received fax from [facility] in regards to Lamontroxana Doran   :  1945     The following information was received via fax:    Resident is on symbicort bid and spiriva every day. He requests he gets also albuterol (proair) inhaler PRN back. Ok for this qvery 4 hours PRN?    Ashley LeChevalier LPN

## 2020-07-15 NOTE — TELEPHONE ENCOUNTER
[place] back from Dr. Delaney. Referral was meant to go to urology.  Natty Pierce RN  Oncology Care Coordinator

## 2020-07-20 NOTE — TELEPHONE ENCOUNTER
Received fax from [facility] (7/15/20 11:09) in regards to Lamont Doran   :  1945     The following information was received via fax:    Residents urine is very mucusy, thick and odorous- any new orders?      Bridgett Jane LPN

## 2020-07-20 NOTE — PROGRESS NOTES
Cataract(s) are not yet visually significant to the patient. Recommend monitoring, and patient agrees with this plan. [facility]    Medicine Progress Note - Hospitalist Service       Date of Admission:  5/14/2020  Assessment & Plan   Lamont Doran is a 74 year old  man who was admitted on 5/14/2020.  He has been at [facility] for rehabilitation.  While there, he had several episodes of syncope  felt to be orthostatic in nature.  He was recently evaluated extensively at [facility] 3/14-19.  Echocardiogram showed normal systolic function without evident structural abnormality.  CTPA, showing no pulmonary embolism.  MRI of the brain was negative.  Telemetry showed sinus with PACs.    He did have some hemoptysis while he was there for which he  underwent bronchoscopy that showed alveolar hemorrhage, and polymicrobial sputum was noted and vasculitis workup was neg.    Subsequently he underwent a surgical procedure at [facility] on 05/11 with a left common and left external iliac angioplasty and stents placed by Dr. George in hope that the improved blood flow would improve his ambulation.    Apparently lower extremity compression hose were not resumed.  He is always somewhat dyspneic because he does have COPD.  able to walk short distances with PT with a wheelchair behind him.  He has the following Acute medical issues:     1.  Syncopal episodes with severe hypotension: Continues to have syncopal episodes. Cosyntropin stim test completed 5/24, suspicious for adrenal insufficiency.      5/20 had RRT was called after he was slumped in chair and ashen after he went to shower without o2---he had desaturated into low 70's with BP 55/32 initially, responded quickly to oxygenation and BP quickly improved in <1 minute with getting back into bed before IVF were begun     orthostatic hypotension improving, not as severe  Tele c/w PAC's  Currently on max dose miodrine  Metoprolol discontinued  Continue lasix as otherwise he gets severely volume overloaded as demonstarted by multiple cxrays  tamsulosin and  finasteride have been placed on hold in case effecting orthostasis  cont compression hose  Discontinued his home seroquel as this too can cause orthostasis  Started on Florinef in addition to midodrine.  Will need neurology follow-up as an outpatient.  TSH was normal.  Cosyntropin stim test done 5/24. Cortisol level was 5 after 60 minutes of cosyntropin. Patient started on hydrocortisone 5/25 in addition to fludrocortisone  5/27 - converted to oral steroids, increased florinef to 200 mcg knight  Will give patient referral to endocrinology    2. Acute on chronic hypoxic resp failure  Also has needed intermittent increased oxygen earlier this admission when pulmonary edema worsened when he was off lasix  Started on IV lasix 40mg tid -- switched to PO now  On 3liters at home chronically  on abx, as acute alveolar process,Completed a course of doxy     3.  Urinary retention  Chronic urethral catheter will need to reschedule with urology.  Keep trinh in for now.  proscar and tamsulosin discontinued in case contributing to orthostasis    4  Acute on chronic HFpEF  Monitor intake and output with daily weights.   The patient still has bibasilar crackles.  Continue on oxygen supplementation. On lasix, will give as bp allows.      5.  Chronic obstructive pulmonary disease with exacerbation, and possible lower tract disease, completed doxycycline course  Continue inhalers    6.  Peripheral vascular disease.  cad pci 2013, bilat cea  He just underwent a left internal and external iliac stenting.  No signs of any ischemia.    Cont asa, statin, plavix    7.  Generalized weakness and gait instability  Greatly complicated by his orthostatic hypotension.  Continued physical therapy and Occupational Therapy to the extent this is possible given  He has severe orthostatic hypotension     8 Recent hemptysis with bronch showing alveaolar hemorrhage, had negative vasculitis workup, repeat vasculitis workup as still with minimal  hemoptysis--GBM/anca neg, jose a negative.    9 Electrolyte derangement replace potassium, magnesium    10 Gerd on protonix    11 Troponinemia, likely from demand ischemia from volume overloaded status    12 Severe prot calorie malnutrition with 3rd spacing which may lead to intravascular volume depletion,    13 Mormocytic anemia, check iron studies--ok, b12/folate--ok    14 Insomnia--discontinue seroquel and use melatonin    Diet: Regular Diet Adult    DVT Prophylaxis: on DAPT with minimal hemoptysis, avoid pharmacologic  Clifton Catheter: in place, indication: Retention  Code Status: DNR/DNI           Disposition Plan   Expected discharge: Discharge tomorrow if patient has good day today  Entered: Nanci Ledesma MD, MD 05/27/2020, 8:57 AM         ______________________________________________________________________    Interval History    The patient was seen and examined.  Overnight events reviewed.  Patient seems to be improving, states that his dizziness has improved, and improves throughout the day.  Worse with breakfast.  Changed steroids to oral today.  No new symptoms overnight.    Data reviewed today: I reviewed all medications, new labs and imaging results over the last 24 hours.    CXR  IMPRESSION: No significant change in the appearance of the chest.  Findings may be due to pulmonary edema or diffuse pneumonia.    Physical Exam   Vital Signs: Temp: 96.8  F (36  C) Temp src: Tympanic BP: 160/80 Pulse: 56 Heart Rate: 64 Resp: 18 SpO2: 95 % O2 Device: Nasal cannula Oxygen Delivery: 2 LPM  Weight: 199 lbs 4.73 oz  Constitutional: Looks short of breath  Respiratory: Bibasilar fine crackles  Cardiovascular: RRR; normal s1 s2  GI: soft, nt, nd  Skin: no bruising or bleeding  Musculoskeletal: age appropriate muscle mass  Neurologic: Awake, alert, oriented to name, place and time.  Moving extremities; appears at baseline state.      Data   Recent Labs   Lab 05/26/20  0514 05/25/20  0520 05/24/20  0531  05/21/20  0542    WBC 9.2 8.2 9.6   < >  --    HGB 10.5* 10.5* 10.3*   < >  --    MCV 79 81 80   < >  --     190 166   < >  --     139 139   < > 141   POTASSIUM 3.6 3.6 3.8   < > 4.0   CHLORIDE 106 104 111*   < > 109   CO2 30 30 26   < > 29   BUN 34* 32* 28   < > 26   CR 1.01 1.06 0.86   < > 0.88   ANIONGAP 4 5 2*   < > 3   CLARA 8.5 8.5 8.6   < > 8.6   * 108* 114*   < > 123*   ALBUMIN  --   --   --   --  2.4*   PROTTOTAL  --   --   --   --  5.7*   BILITOTAL  --   --   --   --  0.4   ALKPHOS  --   --   --   --  58   ALT  --   --   --   --  14   AST  --   --   --   --  10    < > = values in this interval not displayed.     Current Facility-Administered Medications   Medication     acetaminophen (TYLENOL) Suppository 650 mg     acetaminophen (TYLENOL) tablet 650 mg     aspirin EC tablet 162 mg     atorvastatin (LIPITOR) tablet 80 mg     clopidogrel (PLAVIX) tablet 75 mg     fludrocortisone (FLORINEF) tablet 200 mcg     fluticasone-vilanterol (BREO ELLIPTA) 200-25 MCG/INH inhaler 1 puff     furosemide (LASIX) tablet 40 mg     guaiFENesin-dextromethorphan (ROBITUSSIN DM) 100-10 MG/5ML syrup 5 mL     ipratropium - albuterol 0.5 mg/2.5 mg/3 mL (DUONEB) neb solution 3 mL     lidocaine (LMX4) kit     lidocaine 1 % 0.1-1 mL     melatonin tablet 3 mg     midodrine (PROAMATINE) tablet 12.5 mg     naloxone (NARCAN) injection 0.1-0.4 mg     nitroGLYcerin (NITROSTAT) sublingual tablet 0.4 mg     ondansetron (ZOFRAN-ODT) ODT tab 4 mg    Or     ondansetron (ZOFRAN) injection 4 mg     pantoprazole (PROTONIX) EC tablet 20 mg     polyethylene glycol (MIRALAX) Packet 17 g     potassium chloride ER (MICRO-K) CR capsule 20 mEq     sodium chloride (PF) 0.9% PF flush 3 mL     sodium chloride (PF) 0.9% PF flush 3 mL     umeclidinium (INCRUSE ELLIPTA) 62.5 MCG/INH inhaler 1 puff

## 2020-07-20 NOTE — PROGRESS NOTES
"Lamont Doran is a 75 year old male who is being evaluated via a billable video visit.      The patient has been notified of following:     \"This video visit will be conducted via a call between you and your physician/provider. We have found that certain health care needs can be provided without the need for an in-person physical exam.  This service lets us provide the care you need with a video conversation.  If a prescription is necessary we can send it directly to your pharmacy.  If lab work is needed we can place an order for that and you can then stop by our lab to have the test done at a later time.    Video visits are billed at different rates depending on your insurance coverage.  Please reach out to your insurance provider with any questions.    If during the course of the call the physician/provider feels a video visit is not appropriate, you will not be charged for this service.\"    Patient has given verbal consent for Video visit? Yes  How would you like to obtain your AVS? Mail a copy  If you are dropped from the video visit, the video invite should be resent to: Text to cell phone: [phone]  Will anyone else be joining your video visit? No        Video-Visit Details    Type of service:  Video Visit    Video Start Time: 1245  Video End Time: 1300    Originating Location (pt. Location): Long term Care    Distant Location (provider location):  [facility]     Platform used for Video Visit: [place]      HISTORY OF PRESENT ILLNESS:  Lamont is a 75 year old male (1945)  resident of [facility]  who is being seen today for initial Nursing Home visit.     He has a complicated medical history including recurrent syncope, COPD (chronic obstructive pulmonary disease), coronary heart disease, PVD (peripheral vascular disease), as well as hypertenison    He was admitted from [facility]  after being hospitalized with recurrent syncope. This was felt to be secondary to " orthostatic hypotension complicated bye the comorbidities described above.      The patient notes the following symptoms:  Difficulty breathing and dizziness.    Discussed with nursing staff who note the following concerns:  Recurrent syncope.    The patient is seen in their room.  Family is not present.     Medical records are reviewed.    Current medications, allergies, and interdisciplinary care plan are reviewed.      Patient Active Problem List    Diagnosis Date Noted     Acute on chronic respiratory failure with hypoxemia (H) 2020     Priority: Medium     Episode of syncope 2020     Priority: Medium     Peripheral vascular disease (H) 2020     Priority: Medium     Syncope 2020     Priority: Medium     Essential hypertension 2020     Priority: Medium     Preop general physical exam 2020     Priority: Medium     Panlobular emphysema (H) 2020     Priority: Medium     Obstructive chronic bronchitis with exacerbation (H) 2020     Priority: Medium     History of MI (myocardial infarction) 2020     Priority: Medium     PVD (posterior vitreous detachment), bilateral 2020     Priority: Medium          Social History     Socioeconomic History     Marital status:      Spouse name: Not on file     Number of children: Not on file     Years of education: Not on file     Highest education level: Not on file   Occupational History     Not on file   Social Needs     Financial resource strain: Not on file     Food insecurity     Worry: Not on file     Inability: Not on file     Transportation needs     Medical: Not on file     Non-medical: Not on file   Tobacco Use     Smoking status: Former Smoker     Types: Cigarettes     Last attempt to quit: 1990     Years since quittin.2     Smokeless tobacco: Never Used   Substance and Sexual Activity     Alcohol use: Not Currently     Drug use: Never     Sexual activity: Not on file   Lifestyle     Physical  activity     Days per week: Not on file     Minutes per session: Not on file     Stress: Not on file   Relationships     Social connections     Talks on phone: Not on file     Gets together: Not on file     Attends Jew service: Not on file     Active member of club or organization: Not on file     Attends meetings of clubs or organizations: Not on file     Relationship status: Not on file     Intimate partner violence     Fear of current or ex partner: Not on file     Emotionally abused: Not on file     Physically abused: Not on file     Forced sexual activity: Not on file   Other Topics Concern     Not on file   Social History Narrative     Not on file        Current Outpatient Medications   Medication Sig     aspirin 81 MG EC tablet Take 162 mg by mouth daily      atorvastatin (LIPITOR) 80 MG tablet Take 80 mg by mouth every evening      budesonide-formoterol (SYMBICORT) 160-4.5 MCG/ACT Inhaler Inhale 2 puffs into the lungs 2 times daily     droxidopa (NORTHERA) 300 MG capsule Take 600 mg by mouth 3 times daily     finasteride (PROSCAR) 5 MG tablet Take 5 mg by mouth daily     fludrocortisone (FLORINEF) 0.1 MG tablet Take 2 tablets (0.2 mg) by mouth daily     LANsoprazole (PREVACID) 15 MG DR capsule Take 15 mg by mouth daily     midodrine (PROAMATINE) 2.5 MG tablet Take 5 tablets (12.5 mg) by mouth 3 times daily Doses may be given in approximately 3 to 4 hour intervals. Avoid dosing after the evening meal or within 4 hours of bedtime. (Patient taking differently: Take 10 mg by mouth 3 times daily Doses may be given in approximately 3 to 4 hour intervals. Avoid dosing after the evening meal or within 4 hours of bedtime.)     potassium chloride ER (K-TAB) 20 MEQ CR tablet Take 20 mEq by mouth daily     rivaroxaban ANTICOAGULANT (XARELTO) 20 MG TABS tablet Take 20 mg by mouth daily     tiotropium (SPIRIVA RESPIMAT) 2.5 MCG/ACT inhaler Inhale 1 puff into the lungs daily      No current facility-administered  medications for this visit.        Allergies   Allergen Reactions     No Clinical Screening - See Comments      Peas and liver       I have reviewed the MDS and I have reviewed the care plan and do not agree with the plan.      ROS:  No chest pain, fever, chills, headache, nausea, vomiting, dysuria, or changes in bowel habits.  Appetite is diminished.  Mild osteoarthritic pain noted.          OBJECTIVE:  /68   Pulse 52   Temp 98.2  F (36.8  C)   Resp 20   Wt 101.2 kg (223 lb)   SpO2 93%   BMI 31.10 kg/m      GENERAL: Healthy, alert and no distress  EYES: Eyes grossly normal to inspection.  No discharge or erythema, or obvious scleral/conjunctival abnormalities.  RESP: No audible wheeze, cough, or visible cyanosis.  No visible retractions or increased work of breathing.    SKIN: Visible skin clear. No significant rash, abnormal pigmentation or lesions.  NEURO: Cranial nerves grossly intact.  Mentation and speech appropriate for age.  PSYCH: Mentation appears normal, affect normal/bright, judgement and insight intact, normal speech and appearance well-groomed.            Lab/Diagnostic data:    Reviewed    ASSESSMENT/ORDERS:  Nursing Home Visit  Discussed with staff. Care plan reviewed and orders updated.  Chronic issues are stable. Routine 30 day Nursing Home follow up.     Syncope, unspecified syncope type  Suspect orthostatic hypotension.  This is complicated by essential hypertension requiring medication management.     Essential hypertension  As above    Panlobular emphysema (H)  Stable on current pulmonary regimen.    Peripheral vascular disease (H)  Stable    History of MI (myocardial infarction)  Nor recurrence        Total time spent with patient visit was 35 min including patient visit, review of pertinent clinical information, and treatment plan.      Slava Delaney MD FAAFP [facility]

## 2020-07-20 NOTE — TELEPHONE ENCOUNTER
Received fax from [employer] (7/15/20 10:22) in regards to Lamontroxana Doran   :  1945     The following information was received via fax:    Resident is complaining of not sleeping well at night. Ok for melatonin 5mg at bedtime dx insomnia      Bridgett Jane LPN

## 2020-07-21 NOTE — TELEPHONE ENCOUNTER
Received fax from [employer] (7/15/20 1:27)  in regards to Lamont GARCIA Stevenjosue   :  1945     The following information was received via fax:    Resident is on symbicort bid and spiriva every day. He requests he get also albuteral (proair) inhaler prn back. Ok for this every 4 hours as needed? Also ok to self administer?         Bridgett Jane LPN

## 2020-07-21 NOTE — TELEPHONE ENCOUNTER
Received fax from [employer] (20 9:07) in regards to Lamont GARCIA Stevenjosue   :  1945     The following information was received via fax:    Resident states is having increased choking on food and fluids. Ok for speech eval and treatment?       Bridgett Jane LPN

## 2020-07-21 NOTE — TELEPHONE ENCOUNTER
[facility] (20 12:34[facility]  in regards to Lamont Doran   :  1945     The following information was received via fax:    Resident has order for potassium 20meq 2 tablets twice daily. Per resident request ok to change potassium to liquid form? Potassium liquid 20meq/15mls give 30ml twice daily?       Bridgett Jane LPN

## 2020-07-21 NOTE — TELEPHONE ENCOUNTER
[facility] Lamont[facility]   :  1945     The following information was received via fax:    resident appears to be more weak, urine very mucousy, odorous and has been coughing up fluids and pills. Crackles noted to RLL. New orders?      Bridgett Jane LPN

## 2020-07-22 ENCOUNTER — TELEPHONE (OUTPATIENT)
Dept: FAMILY MEDICINE | Facility: OTHER | Age: 75
End: 2020-07-22

## 2020-07-22 NOTE — TELEPHONE ENCOUNTER
Daughter Jeimy Pichardo calling and needs cause of death that will be listed on the death certificate. She needs this information to call [employer] back for benefits.Do not see consent to discuss information with daughter.Will take message    She is asking to call her sibling who is in town-they state her mother(pt spouse) is [place].    Please call back sister Patricia at [phone].    Had updated her to contact the Nursing home for this information.      Please advise.      Molly Seo RN

## 2020-07-22 NOTE — TELEPHONE ENCOUNTER
Received fax from [facility] in regards to Lamont Doran   :  1945     The following information was received via fax:    Resident passed away on 20 at 1915. No B/P, pulse, respirations.       Bridgett Jane LPN

## 2020-07-24 NOTE — TELEPHONE ENCOUNTER
Patricia daughter calling and pt passed away and the needs the cause of death on certificate.He is supposed be cremated today.Do not see consent to discuss.Other wise they need to do a biopsy. They are trying to avoid that.Needs for [employer] for spousal benefits.Updated her do not see consent to discuss-will take message.Asked her to speak with nursing home as she has consent there. Per daughter everyone is directing her back to [facility].      Please call     [phone] Patricia Panda